# Patient Record
Sex: FEMALE | Race: WHITE | NOT HISPANIC OR LATINO | Employment: OTHER | URBAN - METROPOLITAN AREA
[De-identification: names, ages, dates, MRNs, and addresses within clinical notes are randomized per-mention and may not be internally consistent; named-entity substitution may affect disease eponyms.]

---

## 2017-11-23 ENCOUNTER — HOSPITAL ENCOUNTER (EMERGENCY)
Facility: OTHER | Age: 52
Discharge: HOME | End: 2017-11-23
Attending: EMERGENCY MEDICINE
Payer: OTHER GOVERNMENT

## 2017-11-23 VITALS
BODY MASS INDEX: 31.28 KG/M2 | WEIGHT: 170 LBS | HEIGHT: 62 IN | OXYGEN SATURATION: 100 % | HEART RATE: 97 BPM | RESPIRATION RATE: 16 BRPM | TEMPERATURE: 99 F | SYSTOLIC BLOOD PRESSURE: 157 MMHG | DIASTOLIC BLOOD PRESSURE: 102 MMHG

## 2017-11-23 DIAGNOSIS — J40 BRONCHITIS: Primary | ICD-10-CM

## 2017-11-23 PROCEDURE — 99283 EMERGENCY DEPT VISIT LOW MDM: CPT

## 2017-11-23 RX ORDER — CANDESARTAN 32 MG/1
32 TABLET ORAL DAILY
COMMUNITY
End: 2019-02-20

## 2017-11-23 RX ORDER — PREDNISONE 10 MG/1
10 TABLET ORAL DAILY
Qty: 5 TABLET | Refills: 0 | Status: SHIPPED | OUTPATIENT
Start: 2017-11-23 | End: 2017-11-28

## 2017-11-23 RX ORDER — ALBUTEROL SULFATE 90 UG/1
2 AEROSOL, METERED RESPIRATORY (INHALATION) EVERY 6 HOURS PRN
Qty: 6.7 G | Refills: 0 | Status: SHIPPED | OUTPATIENT
Start: 2017-11-23 | End: 2022-04-16

## 2017-11-23 RX ORDER — AZITHROMYCIN 250 MG/1
500 TABLET, FILM COATED ORAL DAILY
Qty: 6 TABLET | Refills: 0 | Status: SHIPPED | OUTPATIENT
Start: 2017-11-23 | End: 2019-02-20

## 2017-11-23 NOTE — ED PROVIDER NOTES
Encounter Date: 11/23/2017       History     Chief Complaint   Patient presents with    Cough    Otalgia     The history is provided by the patient.   Cough   This is a new problem. The current episode started yesterday. The problem occurs constantly. The problem has been unchanged. The cough is non-productive. There has been no fever. The fever has been present for 1 to 2 days. Associated symptoms include ear pain, rhinorrhea and sore throat. Pertinent negatives include no chest pain, no chills, no sweats, no weight loss, no ear congestion, no headaches, no myalgias, no shortness of breath, no wheezing and no eye redness. She has tried nothing for the symptoms. She is not a smoker. Her past medical history is significant for bronchitis.   Otalgia   Associated symptoms include rhinorrhea, sore throat and cough. Pertinent negatives include no headaches.     Review of patient's allergies indicates:  No Known Allergies  Past Medical History:   Diagnosis Date    High cholesterol     Hypertension     Thyroid disease      No past surgical history on file.  No family history on file.  Social History   Substance Use Topics    Smoking status: Never Smoker    Smokeless tobacco: Never Used    Alcohol use Not on file     Review of Systems   Constitutional: Negative.  Negative for chills and weight loss.   HENT: Positive for ear pain, rhinorrhea and sore throat.    Eyes: Negative.  Negative for redness.   Respiratory: Positive for cough. Negative for shortness of breath and wheezing.    Cardiovascular: Negative.  Negative for chest pain.   Gastrointestinal: Negative.    Endocrine: Negative.    Genitourinary: Negative.    Musculoskeletal: Negative.  Negative for myalgias.   Skin: Negative.    Allergic/Immunologic: Negative.    Neurological: Negative.  Negative for headaches.   Hematological: Negative.    Psychiatric/Behavioral: Negative.    All other systems reviewed and are negative.      Physical Exam     Initial Vitals  [11/23/17 1727]   BP Pulse Resp Temp SpO2   (!) 157/102 97 16 98.8 °F (37.1 °C) 100 %      MAP       120.33         Physical Exam    Nursing note and vitals reviewed.  Constitutional: Vital signs are normal. She is Obese . She is active and cooperative.   HENT:   Head: Normocephalic and atraumatic.   Right Ear: Tympanic membrane normal.   Left Ear: Tympanic membrane normal.   Nose: Mucosal edema and rhinorrhea present.   Mouth/Throat: Uvula is midline, oropharynx is clear and moist and mucous membranes are normal.   Eyes: Conjunctivae, EOM and lids are normal. Pupils are equal, round, and reactive to light.   Neck: Trachea normal and full passive range of motion without pain. Neck supple. No thyroid mass present. No spinous process tenderness and no muscular tenderness present. No neck rigidity.   Cardiovascular: Normal rate, regular rhythm, S1 normal, S2 normal, normal heart sounds, intact distal pulses and normal pulses.   Pulmonary/Chest: Effort normal and breath sounds normal.           Abdominal: Soft. Normal appearance, normal aorta and bowel sounds are normal.   Musculoskeletal: Normal range of motion.   Lymphadenopathy:     She has no axillary adenopathy.   Neurological: She is alert and oriented to person, place, and time.   Skin: Skin is warm, dry and intact.   Psychiatric: She has a normal mood and affect. Her speech is normal and behavior is normal. Judgment and thought content normal. Cognition and memory are normal.         ED Course   Procedures  Labs Reviewed   POCT URINE PREGNANCY                               ED Course      Clinical Impression:   The encounter diagnosis was Bronchitis.                           Duglas Brown MD  11/23/17 [id number]

## 2017-11-23 NOTE — ED TRIAGE NOTES
"Cough and congestion onset Monday. Seen by MD on Tuesday and given a steroid injection "not better". Denies fever Alert in no resp distress   "

## 2018-02-10 ENCOUNTER — HOSPITAL ENCOUNTER (EMERGENCY)
Facility: HOSPITAL | Age: 53
Discharge: HOME | End: 2018-02-10
Attending: EMERGENCY MEDICINE
Payer: OTHER GOVERNMENT

## 2018-02-10 VITALS
BODY MASS INDEX: 35.5 KG/M2 | SYSTOLIC BLOOD PRESSURE: 147 MMHG | HEIGHT: 61 IN | DIASTOLIC BLOOD PRESSURE: 81 MMHG | HEART RATE: 75 BPM | TEMPERATURE: 99 F | OXYGEN SATURATION: 100 % | WEIGHT: 188 LBS | RESPIRATION RATE: 20 BRPM

## 2018-02-10 DIAGNOSIS — R07.89 CHEST PAIN, NON-CARDIAC: Primary | ICD-10-CM

## 2018-02-10 LAB
ALBUMIN SERPL BCP-MCNC: 4.1 G/DL
ALP SERPL-CCNC: 84 U/L
ALT SERPL W/O P-5'-P-CCNC: 16 U/L
ANION GAP SERPL CALC-SCNC: 11 MMOL/L
AST SERPL-CCNC: 19 U/L
BASOPHILS # BLD AUTO: 0.05 K/UL
BASOPHILS NFR BLD: 1 %
BILIRUB SERPL-MCNC: 0.3 MG/DL
BNP SERPL-MCNC: 12 PG/ML
BUN SERPL-MCNC: 12 MG/DL
CALCIUM SERPL-MCNC: 9.8 MG/DL
CHLORIDE SERPL-SCNC: 105 MMOL/L
CO2 SERPL-SCNC: 23 MMOL/L
CREAT SERPL-MCNC: 0.8 MG/DL
DIFFERENTIAL METHOD: NORMAL
EOSINOPHIL # BLD AUTO: 0.3 K/UL
EOSINOPHIL NFR BLD: 6 %
ERYTHROCYTE [DISTWIDTH] IN BLOOD BY AUTOMATED COUNT: 13.7 %
EST. GFR  (AFRICAN AMERICAN): >60 ML/MIN/1.73 M^2
EST. GFR  (NON AFRICAN AMERICAN): >60 ML/MIN/1.73 M^2
GLUCOSE SERPL-MCNC: 83 MG/DL
HCT VFR BLD AUTO: 40.3 %
HGB BLD-MCNC: 13.6 G/DL
INR PPP: 1.4
LYMPHOCYTES # BLD AUTO: 1.7 K/UL
LYMPHOCYTES NFR BLD: 33.8 %
MCH RBC QN AUTO: 28.1 PG
MCHC RBC AUTO-ENTMCNC: 33.7 G/DL
MCV RBC AUTO: 83 FL
MONOCYTES # BLD AUTO: 0.6 K/UL
MONOCYTES NFR BLD: 12.2 %
NEUTROPHILS # BLD AUTO: 2.4 K/UL
NEUTROPHILS NFR BLD: 47 %
PLATELET # BLD AUTO: 301 K/UL
PMV BLD AUTO: 10.3 FL
POTASSIUM SERPL-SCNC: 4.2 MMOL/L
PROT SERPL-MCNC: 7.7 G/DL
PROTHROMBIN TIME: 14.2 SEC
RBC # BLD AUTO: 4.84 M/UL
SODIUM SERPL-SCNC: 139 MMOL/L
TROPONIN I SERPL DL<=0.01 NG/ML-MCNC: <0.006 NG/ML
TROPONIN I SERPL DL<=0.01 NG/ML-MCNC: <0.006 NG/ML
WBC # BLD AUTO: 5 K/UL

## 2018-02-10 PROCEDURE — 96374 THER/PROPH/DIAG INJ IV PUSH: CPT

## 2018-02-10 PROCEDURE — 84484 ASSAY OF TROPONIN QUANT: CPT

## 2018-02-10 PROCEDURE — 85610 PROTHROMBIN TIME: CPT

## 2018-02-10 PROCEDURE — 99284 EMERGENCY DEPT VISIT MOD MDM: CPT | Mod: 25

## 2018-02-10 PROCEDURE — 93010 ELECTROCARDIOGRAM REPORT: CPT | Mod: ,,, | Performed by: INTERNAL MEDICINE

## 2018-02-10 PROCEDURE — 25000003 PHARM REV CODE 250: Performed by: EMERGENCY MEDICINE

## 2018-02-10 PROCEDURE — 83880 ASSAY OF NATRIURETIC PEPTIDE: CPT

## 2018-02-10 PROCEDURE — 63600175 PHARM REV CODE 636 W HCPCS: Performed by: EMERGENCY MEDICINE

## 2018-02-10 PROCEDURE — 93005 ELECTROCARDIOGRAM TRACING: CPT

## 2018-02-10 PROCEDURE — 80053 COMPREHEN METABOLIC PANEL: CPT

## 2018-02-10 PROCEDURE — 85025 COMPLETE CBC W/AUTO DIFF WBC: CPT

## 2018-02-10 RX ORDER — PANTOPRAZOLE SODIUM 40 MG/1
80 TABLET, DELAYED RELEASE ORAL
Status: COMPLETED | OUTPATIENT
Start: 2018-02-10 | End: 2018-02-10

## 2018-02-10 RX ORDER — DICYCLOMINE HYDROCHLORIDE 20 MG/1
20 TABLET ORAL EVERY 6 HOURS PRN
Qty: 20 TABLET | Refills: 0 | Status: SHIPPED | OUTPATIENT
Start: 2018-02-10 | End: 2019-02-20

## 2018-02-10 RX ORDER — KETOROLAC TROMETHAMINE 30 MG/ML
30 INJECTION, SOLUTION INTRAMUSCULAR; INTRAVENOUS
Status: COMPLETED | OUTPATIENT
Start: 2018-02-10 | End: 2018-02-10

## 2018-02-10 RX ORDER — VALSARTAN 80 MG/1
160 TABLET ORAL 2 TIMES DAILY
COMMUNITY

## 2018-02-10 RX ORDER — MAG HYDROX/ALUMINUM HYD/SIMETH 200-200-20
60 SUSPENSION, ORAL (FINAL DOSE FORM) ORAL
Status: COMPLETED | OUTPATIENT
Start: 2018-02-10 | End: 2018-02-10

## 2018-02-10 RX ORDER — PANTOPRAZOLE SODIUM 40 MG/1
40 TABLET, DELAYED RELEASE ORAL 2 TIMES DAILY
Qty: 30 TABLET | Refills: 0 | Status: SHIPPED | OUTPATIENT
Start: 2018-02-10 | End: 2019-02-20

## 2018-02-10 RX ORDER — DICYCLOMINE HYDROCHLORIDE 10 MG/1
20 CAPSULE ORAL
Status: COMPLETED | OUTPATIENT
Start: 2018-02-10 | End: 2018-02-10

## 2018-02-10 RX ADMIN — PANTOPRAZOLE SODIUM 80 MG: 40 TABLET, DELAYED RELEASE ORAL at 06:02

## 2018-02-10 RX ADMIN — DICYCLOMINE HYDROCHLORIDE 20 MG: 10 CAPSULE ORAL at 06:02

## 2018-02-10 RX ADMIN — ALUMINUM HYDROXIDE, MAGNESIUM HYDROXIDE, AND SIMETHICONE 60 ML: 200; 200; 20 SUSPENSION ORAL at 06:02

## 2018-02-10 RX ADMIN — KETOROLAC TROMETHAMINE 30 MG: 30 INJECTION, SOLUTION INTRAMUSCULAR at 06:02

## 2018-02-10 NOTE — ED TRIAGE NOTES
Pt reports nonradiating midsternal chest tightness. Pt reports recent switch to valsartan, but still can't control BP. Pt reports occasional SOB. Denies headache, dizzines, NVD.

## 2018-02-11 NOTE — DISCHARGE INSTRUCTIONS
Please follow-up with the cardiologist above as directed.  Please return immediately if you get worse or if new problems develop.  Please avoid caffeine carbonation alcohol cigarette citrus tomato ibuprofen and Naprosyn and aspirin.

## 2018-02-11 NOTE — ED PROVIDER NOTES
"Encounter Date: 2/10/2018    SCRIBE #1 NOTE: I, Marbella Garcia , am scribing for, and in the presence of,  Nakul Mora MD . I have scribed the following portions of the note - Other sections scribed: HPI/ROS .       History     Chief Complaint   Patient presents with    Chest Pain     sob started today and "twinging" pain to midsternal area which started today; reports bp meds recently changed    Shortness of Breath     CC: Chest Pain     HPI: This 52 y.o. female with a medical hx of HTN, high cholesterol, and thyroid disease presents to the ED c/o sudden-onset chest tightness that has been occurring intermittently every 15-20 minutes and lasting for 2 minutes prior to spontaneously resolving since noon today. During episodes of chest tightness, pt reports experiencing "twinges" of pain to the middle of her chest. Earlier while at her office, she reports having SOB along with the chest tightness, but denies any SOB currently. For the past couple of weeks, pt reports having trouble controlling her blood pressure despite being compliant with her antihypertensives and was thus switched to valsartan 2 days ago. Pt also reports taking 81 mg ASA daily, but denies a cardiac hx. She notes that she has been belching more frequently and thinks this may be related to her hx of acid reflux. Pt otherwise denies fever, chills, abdominal pain, diaphoresis, dizziness, and N/V.       The history is provided by the patient. No  was used.     Review of patient's allergies indicates:  No Known Allergies  Past Medical History:   Diagnosis Date    High cholesterol     Hypertension     Thyroid disease      No past surgical history on file.  No family history on file.  Social History   Substance Use Topics    Smoking status: Never Smoker    Smokeless tobacco: Never Used    Alcohol use Not on file     Review of Systems   Constitutional: Negative for chills, diaphoresis and fever.   HENT: Negative for " congestion, ear pain, rhinorrhea and sore throat.    Eyes: Negative for visual disturbance.   Respiratory: Positive for chest tightness. Negative for cough and shortness of breath.    Cardiovascular: Negative for chest pain, palpitations and leg swelling.   Gastrointestinal: Negative for abdominal pain, diarrhea, nausea and vomiting.   Genitourinary: Negative for dysuria.   Musculoskeletal: Negative for back pain.   Skin: Negative for rash.   Neurological: Negative for dizziness, weakness, light-headedness, numbness and headaches.       Physical Exam     Initial Vitals [02/10/18 1637]   BP Pulse Resp Temp SpO2   (!) 177/94 75 20 97.8 °F (36.6 °C) 99 %      MAP       121.67         Physical Exam  The patient was examined specifically for the following:   General:No significant distress, Good color, Warm and dry. Head and neck:Scalp atraumatic, Neck supple. Neurological:Appropriate conversation, Gross motor deficits. Eyes:Conjugate gaze, Clear corneas. ENT: No epistaxis. Cardiac: Regular rate and rhythm, Grossly normal heart tones. Pulmonary: Wheezing, Rales. Gastrointestinal: Abdominal tenderness, Abdominal distention. Musculoskeletal: Extremity deformity, Apparent pain with range of motion of the joints. Skin: Rash.   The findings on examination were normal. The lungs are clear.  The heart tones are normal.  The abdomen is soft.  Extremities nontender.  There is no pale range of motion of any joints.  The patient has no rash.  ED Course   Procedures  Labs Reviewed   PROTIME-INR - Abnormal; Notable for the following:        Result Value    Prothrombin Time 14.2 (*)     INR 1.4 (*)     All other components within normal limits   CBC W/ AUTO DIFFERENTIAL   COMPREHENSIVE METABOLIC PANEL   TROPONIN I   B-TYPE NATRIURETIC PEPTIDE   TROPONIN I     EKG Readings: (Independently Interpreted)   Patient is in a normal sinus rhythm with a heart rate is 69.  The IA QRS and QT intervals are normal.  There are no significant ST  segment or T-wave changes.  There is no evidence of acute myocardial infarction or malignant arrhythmia.       X-Rays:   Independently Interpreted Readings:   Other Readings:  Chest x-ray fails to reveal pneumothorax pneumonia pleural effusion.    Decision making: Given the above this patient has an atypical chest pain story.  She is having high blood pressure.  She is getting little twinges of chest pain.  She has episodes come and go.  They recur spontaneously.   The patient's evaluation in the emergency room was essentially unremarkable.  At 7:30.  The patient is having a little fluttering feeling was that last 1 second.  The previous symptoms have resolved since treatment with Maalox and pantoprazole.  I will repeat a second troponin, and if it is normal, I will discharge this patient to follow-up with cardiology this week.  Chest x-ray fails to reveal pneumothorax pneumonia pleural effusion.  There is no tachycardia.  I doubt pulmonary embolus.   I will sign out the second troponin to the next shift.             Scribe Attestation:   Scribe #1: I performed the above scribed service and the documentation accurately describes the services I performed. I attest to the accuracy of the note.    Attending Attestation:           Physician Attestation for Scribe:  Physician Attestation Statement for Scribe #1: I, Nakul Mora MD , reviewed documentation, as scribed by Marbella Garcia  in my presence, and it is both accurate and complete.                 ED Course      Clinical Impression:   The encounter diagnosis was Chest pain, non-cardiac.                           Nakul Mora MD  02/10/18 [id number]

## 2019-02-20 ENCOUNTER — OFFICE VISIT (OUTPATIENT)
Dept: URGENT CARE | Facility: CLINIC | Age: 54
End: 2019-02-20
Payer: OTHER GOVERNMENT

## 2019-02-20 VITALS
BODY MASS INDEX: 35.52 KG/M2 | WEIGHT: 188 LBS | DIASTOLIC BLOOD PRESSURE: 86 MMHG | TEMPERATURE: 99 F | HEART RATE: 80 BPM | SYSTOLIC BLOOD PRESSURE: 150 MMHG | OXYGEN SATURATION: 100 %

## 2019-02-20 DIAGNOSIS — R68.89 FLU-LIKE SYMPTOMS: ICD-10-CM

## 2019-02-20 DIAGNOSIS — J02.9 SORE THROAT: ICD-10-CM

## 2019-02-20 DIAGNOSIS — I10 HYPERTENSION, UNSPECIFIED TYPE: ICD-10-CM

## 2019-02-20 DIAGNOSIS — Z20.828 EXPOSURE TO THE FLU: Primary | ICD-10-CM

## 2019-02-20 LAB
CTP QC/QA: YES
CTP QC/QA: YES
FLUAV AG NPH QL: NEGATIVE
FLUBV AG NPH QL: NEGATIVE
S PYO RRNA THROAT QL PROBE: NEGATIVE

## 2019-02-20 PROCEDURE — 99203 PR OFFICE/OUTPT VISIT, NEW, LEVL III, 30-44 MIN: ICD-10-PCS | Mod: 25,S$GLB,, | Performed by: NURSE PRACTITIONER

## 2019-02-20 PROCEDURE — 87804 INFLUENZA ASSAY W/OPTIC: CPT | Mod: QW,S$GLB,, | Performed by: NURSE PRACTITIONER

## 2019-02-20 PROCEDURE — 87880 STREP A ASSAY W/OPTIC: CPT | Mod: QW,S$GLB,, | Performed by: NURSE PRACTITIONER

## 2019-02-20 PROCEDURE — 87804 POCT INFLUENZA A/B: ICD-10-PCS | Mod: 59,QW,S$GLB, | Performed by: NURSE PRACTITIONER

## 2019-02-20 PROCEDURE — 99203 OFFICE O/P NEW LOW 30 MIN: CPT | Mod: 25,S$GLB,, | Performed by: NURSE PRACTITIONER

## 2019-02-20 PROCEDURE — 87880 POCT RAPID STREP A: ICD-10-PCS | Mod: QW,S$GLB,, | Performed by: NURSE PRACTITIONER

## 2019-02-20 RX ORDER — OSELTAMIVIR PHOSPHATE 75 MG/1
75 CAPSULE ORAL DAILY
Qty: 10 CAPSULE | Refills: 0 | Status: SHIPPED | OUTPATIENT
Start: 2019-02-20 | End: 2019-03-02

## 2019-02-20 RX ORDER — PROMETHAZINE HYDROCHLORIDE AND CODEINE PHOSPHATE 6.25; 1 MG/5ML; MG/5ML
5 SOLUTION ORAL EVERY 6 HOURS PRN
Qty: 240 ML | Refills: 0 | Status: SHIPPED | OUTPATIENT
Start: 2019-02-20 | End: 2019-02-27

## 2019-02-20 NOTE — PROGRESS NOTES
Subjective:       Patient ID: Dea Ravi is a 53 y.o. female.    Vitals:  weight is 85.3 kg (188 lb). Her temperature is 98.7 °F (37.1 °C). Her blood pressure is 150/86 (abnormal) and her pulse is 80. Her oxygen saturation is 100%.     Chief Complaint: URI    Patient states she started to feel bad last night.  Daughter was diagnosed with the flu on Monday.      URI    This is a new problem. The current episode started yesterday. The problem has been gradually worsening. There has been no fever. Associated symptoms include congestion, coughing, headaches, rhinorrhea and sneezing. Pertinent negatives include no chest pain, diarrhea, dysuria, nausea, rash, sore throat or vomiting. Treatments tried: natural theraflu. The treatment provided no relief.       Constitution: Negative for chills, fatigue and fever.        Pt feels hot   HENT: Positive for congestion, postnasal drip and voice change. Negative for sore throat.    Neck: Negative for painful lymph nodes.   Cardiovascular: Negative for chest pain and leg swelling.   Eyes: Negative for double vision and blurred vision.   Respiratory: Positive for cough. Negative for shortness of breath.    Gastrointestinal: Negative for nausea, vomiting and diarrhea.   Genitourinary: Negative for dysuria, frequency, urgency and history of kidney stones.   Musculoskeletal: Negative for joint pain, joint swelling, muscle cramps and muscle ache.   Skin: Negative for color change, pale, rash and bruising.   Allergic/Immunologic: Positive for sneezing. Negative for seasonal allergies.   Neurological: Positive for headaches. Negative for dizziness, history of vertigo, light-headedness and passing out.   Hematologic/Lymphatic: Negative for swollen lymph nodes.   Psychiatric/Behavioral: Negative for nervous/anxious, sleep disturbance and depression. The patient is not nervous/anxious.        Objective:      Physical Exam   Constitutional: She is oriented to person, place, and time.  She appears well-developed and well-nourished. She is cooperative.  Non-toxic appearance. She does not have a sickly appearance. She does not appear ill. No distress.   HENT:   Head: Normocephalic and atraumatic.   Right Ear: Hearing, external ear and ear canal normal. A middle ear effusion is present.   Left Ear: Hearing, external ear and ear canal normal. A middle ear effusion is present.   Nose: Mucosal edema and rhinorrhea present. No nasal deformity. No epistaxis. Right sinus exhibits no maxillary sinus tenderness and no frontal sinus tenderness. Left sinus exhibits no maxillary sinus tenderness and no frontal sinus tenderness.   Mouth/Throat: Uvula is midline and mucous membranes are normal. No trismus in the jaw. Normal dentition. No uvula swelling. Posterior oropharyngeal erythema present.   Eyes: Conjunctivae and lids are normal. No scleral icterus.   Sclera clear bilat   Neck: Trachea normal, normal range of motion, full passive range of motion without pain and phonation normal. Neck supple. No spinous process tenderness and no muscular tenderness present. No neck rigidity. Normal range of motion present.   Cardiovascular: Normal rate, regular rhythm, normal heart sounds and normal pulses.   Pulmonary/Chest: Effort normal and breath sounds normal. No respiratory distress.   Abdominal: Soft. Normal appearance and bowel sounds are normal. She exhibits no distension. There is no tenderness.   Musculoskeletal: Normal range of motion. She exhibits no edema or deformity.   Lymphadenopathy:     She has cervical adenopathy.        Right cervical: Superficial cervical adenopathy present.        Left cervical: Superficial cervical adenopathy present.   Neurological: She is alert and oriented to person, place, and time. She exhibits normal muscle tone. Coordination normal.   Skin: Skin is warm, dry and intact. Capillary refill takes less than 2 seconds. She is not diaphoretic. No pallor.   Psychiatric: She has a  normal mood and affect. Her speech is normal and behavior is normal. Judgment and thought content normal. Cognition and memory are normal.   Nursing note and vitals reviewed.      Results for orders placed or performed in visit on 02/20/19   POCT Influenza A/B   Result Value Ref Range    Rapid Influenza A Ag Negative Negative    Rapid Influenza B Ag Negative Negative     Acceptable Yes    POCT rapid strep A   Result Value Ref Range    Rapid Strep A Screen Negative Negative     Acceptable Yes      Assessment:       1. Exposure to the flu    2. Sore throat    3. Flu-like symptoms    4. Hypertension, unspecified type        Plan:         Exposure to the flu  -     POCT Influenza A/B  -     oseltamivir (TAMIFLU) 75 MG capsule; Take 1 capsule (75 mg total) by mouth once daily. for 10 days  Dispense: 10 capsule; Refill: 0  -     promethazine-codeine 6.25-10 mg/5 ml (PHENERGAN WITH CODEINE) 6.25-10 mg/5 mL syrup; Take 5 mLs by mouth every 6 (six) hours as needed for Cough.  Dispense: 240 mL; Refill: 0    Sore throat  -     POCT rapid strep A    Flu-like symptoms  -     POCT Influenza A/B  -     oseltamivir (TAMIFLU) 75 MG capsule; Take 1 capsule (75 mg total) by mouth once daily. for 10 days  Dispense: 10 capsule; Refill: 0  -     promethazine-codeine 6.25-10 mg/5 ml (PHENERGAN WITH CODEINE) 6.25-10 mg/5 mL syrup; Take 5 mLs by mouth every 6 (six) hours as needed for Cough.  Dispense: 240 mL; Refill: 0    Hypertension, unspecified type      Patient Instructions       Please drink plenty of fluids.  Please get plenty of rest.  Please return here or go to the Emergency Department for any concerns or worsening of condition.  Tamiflu prescription has been discussed and if prescribed, please take to completion unless you cannot tolerate the side effects.   If you were prescribed a narcotic medication, do not drive or operate heavy equipment or machinery while taking these medications.  If you were  given a steroid shot in the clinic and have also been given a prescription for a steroid such as Prednisone or a Medrol Dose Pack, please begin taking them tomorrow.  If you do not have Hypertension or any history of palpitations, it is ok to take over the counter Sudafed or Mucinex D or Allegra-D or Claritin-D or Zyrtec-D.  If you do take one of the above, it is ok to combine that with plain over the counter Mucinex or Allegra or Claritin or Zyrtec.  If for example you are taking Zyrtec -D, you can combine that with Mucinex, but not Mucinex-D.  If you are taking Mucinex-D, you can combine that with plain Allegra or Claritin or Zyrtec.   If you do have Hypertension or palpitations, it is safe to take Coricidin HBP for relief of sinus symptoms.  If not allergic, please take over the counter Tylenol (Acetaminophen) and/or Motrin (Ibuprofen) as directed for control of pain and/or fever.  Please follow up with your primary care doctor or specialist as needed.    If you  smoke, please stop smoking.    The Flu (Influenza)     The virus that causes the flu spreads through the air in droplets when someone who has the flu coughs, sneezes, laughs, or talks.   The flu (influenza) is an infection that affects your respiratory tract. This tract is made up of your mouth, nose, and lungs, and the passages between them. Unlike a cold, the flu can make you very ill. And it can lead to pneumonia, a serious lung infection. The flu can have serious complications and even cause death.  Who is at risk for the flu?  Anyone can get the flu. But you are more likely to become infected if you:  · Have a weakened immune system  · Work in a healthcare setting where you may be exposed to flu germs  · Live or work with someone who has the flu  · Havent had an annual flu shot  How does the flu spread?  The flu is caused by a virus. The virus spreads through the air in droplets when someone who has the flu coughs, sneezes, laughs, or talks. You  can become infected when you inhale these viruses directly. You can also become infected when you touch a surface on which the droplets have landed and then transfer the germs to your eyes, nose, or mouth. Touching used tissues, or sharing utensils, drinking glasses, or a toothbrush from an infected person can expose you to flu viruses, too.  What are the symptoms of the flu?  Flu symptoms tend to come on quickly and may last a few days to a few weeks. They include:  · Fever usually higher than 100.4°F  (38°C) and chills  · Sore throat and headache  · Dry cough  · Runny nose  · Tiredness and weakness  · Muscle aches  Who is at risk for flu complications?  For some people, the flu can be very serious. The risk for complications is greater for:  · Children younger than age 5  · Adults ages 65 and older  · People with a chronic illness such as diabetes or heart, kidney, or lung disease  · People who live in a nursing home or long-term care facility   How is the flu treated?  The flu usually gets better after 7 days or so. In some cases, your healthcare provider may prescribe an antiviral medicine. This may help you get well a little sooner. For the medicine to help, you need to take it as soon as possible (ideally within 48 hours) after your symptoms start. If you develop pneumonia or other serious illness, you may need to stay in the hospital.  Easing flu symptoms  · Drink lots of fluids such as water, juice, and warm soup. A good rule is to drink enough so that you urinate your normal amount.  · Get plenty of rest.  · Ask your healthcare provider what to take for fever and pain.  · Call your provider if your fever is 100.4°F (38°C) or higher, or you become dizzy, lightheaded, or short of breath.  Taking steps to protect others  · Wash your hands often, especially after coughing or sneezing. Or clean your hands with an alcohol-based hand  containing at least 60% alcohol.  · Cough or sneeze into a tissue. Then  throw the tissue away and wash your hands. If you dont have a tissue, cough and sneeze into your elbow.  · Stay home until at least 24 hours after you no longer have a fever or chills. Be sure the fever isnt being hidden by fever-reducing medicine.  · Dont share food, utensils, drinking glasses, or a toothbrush with others.  · Ask your healthcare provider if others in your household should get antiviral medicine to help them avoid infection.  How can the flu be prevented?  · One of the best ways to avoid the flu is to get a flu vaccine each year. The virus that causes the flu changes from year to year. For that reason, healthcare providers recommend getting the flu vaccine each year, as soon as it's available in your area. The vaccine is given as a shot. Your healthcare provider can tell you which vaccine is right for you. A nasal spray is also available but is not recommended for the 0727-3631 flu season. The CDC says this is because the nasal spray did not seem to protect against the flu over the last several flu seasons. In the past, it was meant for people ages 2 to 49.  · Wash your hands often. Frequent handwashing is a proven way to help prevent infection.  · Carry an alcohol-based hand gel containing at least 60% alcohol. Use it when you can't use soap and water. Then wash your hands as soon as you can.  · Avoid touching your eyes, nose, and mouth.  · At home and work, clean phones, computer keyboards, and toys often with disinfectant wipes.  · If possible, avoid close contact with others who have the flu or symptoms of the flu.  Handwashing tips  Handwashing is one of the best ways to prevent many common infections. If you are caring for or visiting someone with the flu, wash your hands each time you enter and leave the room. Follow these steps:  · Use warm water and plenty of soap. Rub your hands together well.  · Clean the whole hand, including under your nails, between your fingers, and up the  wrists.  · Wash for at least 15 seconds.  · Rinse, letting the water run down your fingers, not up your wrists.  · Dry your hands well. Use a paper towel to turn off the faucet and open the door.  Using alcohol-based hand   Alcohol-based hand  are also a good choice. Use them when you can't use soap and water. Follow these steps:  · Squeeze about a tablespoon of gel into the palm of one hand.  · Rub your hands together briskly, cleaning the backs of your hands, the palms, between your fingers, and up the wrists.  · Rub until the gel is gone and your hands are completely dry.  Preventing the flu in healthcare settings  The flu is a special concern for people in hospitals and long-term care facilities. To help prevent the spread of flu, many hospitals and nursing homes take these steps:  · Healthcare providers wash their hands or use an alcohol-based hand  before and after treating each patient.  · People with the flu have private rooms and bathrooms or share a room with someone with the same infection.  · People who are at high risk for the flu but don't have it are encouraged to get the flu and pneumonia vaccines.  · All healthcare workers are encouraged or required to get flu shots.   Date Last Reviewed: 12/1/2016  © 9760-2741 [employer]. [address] [place] [address], [place] [address]. All rights reserved. This information is not intended as a substitute for professional medical care. Always follow your healthcare professional's instructions.

## 2019-02-20 NOTE — PATIENT INSTRUCTIONS
Please drink plenty of fluids.  Please get plenty of rest.  Please return here or go to the Emergency Department for any concerns or worsening of condition.  Tamiflu prescription has been discussed and if prescribed, please take to completion unless you cannot tolerate the side effects.   If you were prescribed a narcotic medication, do not drive or operate heavy equipment or machinery while taking these medications.  If you were given a steroid shot in the clinic and have also been given a prescription for a steroid such as Prednisone or a Medrol Dose Pack, please begin taking them tomorrow.  If you do not have Hypertension or any history of palpitations, it is ok to take over the counter Sudafed or Mucinex D or Allegra-D or Claritin-D or Zyrtec-D.  If you do take one of the above, it is ok to combine that with plain over the counter Mucinex or Allegra or Claritin or Zyrtec.  If for example you are taking Zyrtec -D, you can combine that with Mucinex, but not Mucinex-D.  If you are taking Mucinex-D, you can combine that with plain Allegra or Claritin or Zyrtec.   If you do have Hypertension or palpitations, it is safe to take Coricidin HBP for relief of sinus symptoms.  If not allergic, please take over the counter Tylenol (Acetaminophen) and/or Motrin (Ibuprofen) as directed for control of pain and/or fever.  Please follow up with your primary care doctor or specialist as needed.    If you  smoke, please stop smoking.    The Flu (Influenza)     The virus that causes the flu spreads through the air in droplets when someone who has the flu coughs, sneezes, laughs, or talks.   The flu (influenza) is an infection that affects your respiratory tract. This tract is made up of your mouth, nose, and lungs, and the passages between them. Unlike a cold, the flu can make you very ill. And it can lead to pneumonia, a serious lung infection. The flu can have serious complications and even cause death.  Who is at risk for the  flu?  Anyone can get the flu. But you are more likely to become infected if you:  · Have a weakened immune system  · Work in a healthcare setting where you may be exposed to flu germs  · Live or work with someone who has the flu  · Havent had an annual flu shot  How does the flu spread?  The flu is caused by a virus. The virus spreads through the air in droplets when someone who has the flu coughs, sneezes, laughs, or talks. You can become infected when you inhale these viruses directly. You can also become infected when you touch a surface on which the droplets have landed and then transfer the germs to your eyes, nose, or mouth. Touching used tissues, or sharing utensils, drinking glasses, or a toothbrush from an infected person can expose you to flu viruses, too.  What are the symptoms of the flu?  Flu symptoms tend to come on quickly and may last a few days to a few weeks. They include:  · Fever usually higher than 100.4°F  (38°C) and chills  · Sore throat and headache  · Dry cough  · Runny nose  · Tiredness and weakness  · Muscle aches  Who is at risk for flu complications?  For some people, the flu can be very serious. The risk for complications is greater for:  · Children younger than age 5  · Adults ages 65 and older  · People with a chronic illness such as diabetes or heart, kidney, or lung disease  · People who live in a nursing home or long-term care facility   How is the flu treated?  The flu usually gets better after 7 days or so. In some cases, your healthcare provider may prescribe an antiviral medicine. This may help you get well a little sooner. For the medicine to help, you need to take it as soon as possible (ideally within 48 hours) after your symptoms start. If you develop pneumonia or other serious illness, you may need to stay in the hospital.  Easing flu symptoms  · Drink lots of fluids such as water, juice, and warm soup. A good rule is to drink enough so that you urinate your normal  amount.  · Get plenty of rest.  · Ask your healthcare provider what to take for fever and pain.  · Call your provider if your fever is 100.4°F (38°C) or higher, or you become dizzy, lightheaded, or short of breath.  Taking steps to protect others  · Wash your hands often, especially after coughing or sneezing. Or clean your hands with an alcohol-based hand  containing at least 60% alcohol.  · Cough or sneeze into a tissue. Then throw the tissue away and wash your hands. If you dont have a tissue, cough and sneeze into your elbow.  · Stay home until at least 24 hours after you no longer have a fever or chills. Be sure the fever isnt being hidden by fever-reducing medicine.  · Dont share food, utensils, drinking glasses, or a toothbrush with others.  · Ask your healthcare provider if others in your household should get antiviral medicine to help them avoid infection.  How can the flu be prevented?  · One of the best ways to avoid the flu is to get a flu vaccine each year. The virus that causes the flu changes from year to year. For that reason, healthcare providers recommend getting the flu vaccine each year, as soon as it's available in your area. The vaccine is given as a shot. Your healthcare provider can tell you which vaccine is right for you. A nasal spray is also available but is not recommended for the 3521-8540 flu season. The CDC says this is because the nasal spray did not seem to protect against the flu over the last several flu seasons. In the past, it was meant for people ages 2 to 49.  · Wash your hands often. Frequent handwashing is a proven way to help prevent infection.  · Carry an alcohol-based hand gel containing at least 60% alcohol. Use it when you can't use soap and water. Then wash your hands as soon as you can.  · Avoid touching your eyes, nose, and mouth.  · At home and work, clean phones, computer keyboards, and toys often with disinfectant wipes.  · If possible, avoid close  contact with others who have the flu or symptoms of the flu.  Handwashing tips  Handwashing is one of the best ways to prevent many common infections. If you are caring for or visiting someone with the flu, wash your hands each time you enter and leave the room. Follow these steps:  · Use warm water and plenty of soap. Rub your hands together well.  · Clean the whole hand, including under your nails, between your fingers, and up the wrists.  · Wash for at least 15 seconds.  · Rinse, letting the water run down your fingers, not up your wrists.  · Dry your hands well. Use a paper towel to turn off the faucet and open the door.  Using alcohol-based hand   Alcohol-based hand  are also a good choice. Use them when you can't use soap and water. Follow these steps:  · Squeeze about a tablespoon of gel into the palm of one hand.  · Rub your hands together briskly, cleaning the backs of your hands, the palms, between your fingers, and up the wrists.  · Rub until the gel is gone and your hands are completely dry.  Preventing the flu in healthcare settings  The flu is a special concern for people in hospitals and long-term care facilities. To help prevent the spread of flu, many hospitals and nursing homes take these steps:  · Healthcare providers wash their hands or use an alcohol-based hand  before and after treating each patient.  · People with the flu have private rooms and bathrooms or share a room with someone with the same infection.  · People who are at high risk for the flu but don't have it are encouraged to get the flu and pneumonia vaccines.  · All healthcare workers are encouraged or required to get flu shots.   Date Last Reviewed: 12/1/2016  © 9588-3138 [employer]. [address] [place] [address], [place] [address]. All rights reserved. This information is not intended as a substitute for professional medical care. Always follow your healthcare professional's instructions.

## 2019-03-21 ENCOUNTER — ANESTHESIA EVENT (OUTPATIENT)
Dept: ENDOSCOPY | Facility: HOSPITAL | Age: 54
DRG: 417 | End: 2019-03-21
Payer: OTHER GOVERNMENT

## 2019-03-21 ENCOUNTER — ANESTHESIA (OUTPATIENT)
Dept: ENDOSCOPY | Facility: HOSPITAL | Age: 54
DRG: 417 | End: 2019-03-21
Payer: OTHER GOVERNMENT

## 2019-03-21 ENCOUNTER — HOSPITAL ENCOUNTER (INPATIENT)
Facility: HOSPITAL | Age: 54
LOS: 6 days | Discharge: HOME | DRG: 417 | End: 2019-03-27
Attending: EMERGENCY MEDICINE | Admitting: INTERNAL MEDICINE
Payer: OTHER GOVERNMENT

## 2019-03-21 DIAGNOSIS — K85.10 ACUTE GALLSTONE PANCREATITIS: Primary | ICD-10-CM

## 2019-03-21 DIAGNOSIS — R10.9 ABDOMINAL PAIN, UNSPECIFIED ABDOMINAL LOCATION: ICD-10-CM

## 2019-03-21 DIAGNOSIS — K85.90 ACUTE PANCREATITIS, UNSPECIFIED COMPLICATION STATUS, UNSPECIFIED PANCREATITIS TYPE: ICD-10-CM

## 2019-03-21 DIAGNOSIS — R11.10 VOMITING: ICD-10-CM

## 2019-03-21 PROBLEM — K25.3 ACUTE GASTRIC ULCER WITHOUT HEMORRHAGE OR PERFORATION: Status: ACTIVE | Noted: 2019-03-21

## 2019-03-21 PROBLEM — K83.09 ASCENDING CHOLANGITIS: Status: ACTIVE | Noted: 2019-03-21

## 2019-03-21 PROBLEM — E66.9 OBESITY: Status: ACTIVE | Noted: 2019-03-21

## 2019-03-21 PROBLEM — I10 ESSENTIAL HYPERTENSION: Status: ACTIVE | Noted: 2019-03-21

## 2019-03-21 PROBLEM — E78.5 HYPERLIPIDEMIA: Status: ACTIVE | Noted: 2019-03-21

## 2019-03-21 LAB
ALBUMIN SERPL BCP-MCNC: 4.1 G/DL
ALP SERPL-CCNC: 180 U/L
ALT SERPL W/O P-5'-P-CCNC: 404 U/L
ANION GAP SERPL CALC-SCNC: 8 MMOL/L
AST SERPL-CCNC: 677 U/L
BASOPHILS # BLD AUTO: 0.03 K/UL
BASOPHILS NFR BLD: 0.4 %
BILIRUB SERPL-MCNC: 0.9 MG/DL
BILIRUB UR QL STRIP: NEGATIVE
BNP SERPL-MCNC: 15 PG/ML
BUN SERPL-MCNC: 10 MG/DL
CALCIUM SERPL-MCNC: 10.5 MG/DL
CHLORIDE SERPL-SCNC: 104 MMOL/L
CHOLEST SERPL-MCNC: 250 MG/DL
CHOLEST/HDLC SERPL: 3.6 {RATIO}
CLARITY UR: CLEAR
CO2 SERPL-SCNC: 26 MMOL/L
COLOR UR: YELLOW
CREAT SERPL-MCNC: 0.8 MG/DL
DIFFERENTIAL METHOD: ABNORMAL
EOSINOPHIL # BLD AUTO: 0.1 K/UL
EOSINOPHIL NFR BLD: 1.5 %
ERYTHROCYTE [DISTWIDTH] IN BLOOD BY AUTOMATED COUNT: 13.8 %
EST. GFR  (AFRICAN AMERICAN): >60 ML/MIN/1.73 M^2
EST. GFR  (NON AFRICAN AMERICAN): >60 ML/MIN/1.73 M^2
GLUCOSE SERPL-MCNC: 98 MG/DL
GLUCOSE UR QL STRIP: NEGATIVE
HCT VFR BLD AUTO: 44.4 %
HDLC SERPL-MCNC: 69 MG/DL
HDLC SERPL: 27.6 %
HGB BLD-MCNC: 14.4 G/DL
HGB UR QL STRIP: NEGATIVE
KETONES UR QL STRIP: NEGATIVE
LDLC SERPL CALC-MCNC: 153.2 MG/DL
LEUKOCYTE ESTERASE UR QL STRIP: NEGATIVE
LIPASE SERPL-CCNC: >1000 U/L
LYMPHOCYTES # BLD AUTO: 1.5 K/UL
LYMPHOCYTES NFR BLD: 19.2 %
MCH RBC QN AUTO: 28 PG
MCHC RBC AUTO-ENTMCNC: 32.4 G/DL
MCV RBC AUTO: 86 FL
MONOCYTES # BLD AUTO: 0.7 K/UL
MONOCYTES NFR BLD: 8.7 %
NEUTROPHILS # BLD AUTO: 5.6 K/UL
NEUTROPHILS NFR BLD: 70.2 %
NITRITE UR QL STRIP: NEGATIVE
NONHDLC SERPL-MCNC: 181 MG/DL
PH UR STRIP: 5 [PH] (ref 5–8)
PLATELET # BLD AUTO: 360 K/UL
PMV BLD AUTO: 10.1 FL
POTASSIUM SERPL-SCNC: 3.6 MMOL/L
PROT SERPL-MCNC: 7.8 G/DL
PROT UR QL STRIP: NEGATIVE
RBC # BLD AUTO: 5.14 M/UL
SODIUM SERPL-SCNC: 138 MMOL/L
SP GR UR STRIP: >1.03 (ref 1–1.03)
TRIGL SERPL-MCNC: 139 MG/DL
TROPONIN I SERPL DL<=0.01 NG/ML-MCNC: 0.01 NG/ML
URN SPEC COLLECT METH UR: ABNORMAL
UROBILINOGEN UR STRIP-ACNC: ABNORMAL EU/DL
WBC # BLD AUTO: 8.03 K/UL

## 2019-03-21 PROCEDURE — 96375 TX/PRO/DX INJ NEW DRUG ADDON: CPT

## 2019-03-21 PROCEDURE — 96376 TX/PRO/DX INJ SAME DRUG ADON: CPT

## 2019-03-21 PROCEDURE — 25000003 PHARM REV CODE 250: Performed by: INTERNAL MEDICINE

## 2019-03-21 PROCEDURE — 85025 COMPLETE CBC W/AUTO DIFF WBC: CPT

## 2019-03-21 PROCEDURE — D9220A PRA ANESTHESIA: ICD-10-PCS | Mod: CRNA,,, | Performed by: NURSE ANESTHETIST, CERTIFIED REGISTERED

## 2019-03-21 PROCEDURE — 25000003 PHARM REV CODE 250: Performed by: EMERGENCY MEDICINE

## 2019-03-21 PROCEDURE — 96374 THER/PROPH/DIAG INJ IV PUSH: CPT

## 2019-03-21 PROCEDURE — 63600175 PHARM REV CODE 636 W HCPCS: Mod: JG

## 2019-03-21 PROCEDURE — 63600175 PHARM REV CODE 636 W HCPCS: Performed by: NURSE ANESTHETIST, CERTIFIED REGISTERED

## 2019-03-21 PROCEDURE — 43264 ERCP REMOVE DUCT CALCULI: CPT | Performed by: INTERNAL MEDICINE

## 2019-03-21 PROCEDURE — 81003 URINALYSIS AUTO W/O SCOPE: CPT

## 2019-03-21 PROCEDURE — 27201674 HC SPHINCTERTOME: Performed by: INTERNAL MEDICINE

## 2019-03-21 PROCEDURE — 80061 LIPID PANEL: CPT

## 2019-03-21 PROCEDURE — 99285 EMERGENCY DEPT VISIT HI MDM: CPT | Mod: 25

## 2019-03-21 PROCEDURE — 25500020 PHARM REV CODE 255

## 2019-03-21 PROCEDURE — 63600175 PHARM REV CODE 636 W HCPCS: Performed by: EMERGENCY MEDICINE

## 2019-03-21 PROCEDURE — 96361 HYDRATE IV INFUSION ADD-ON: CPT

## 2019-03-21 PROCEDURE — 25500020 PHARM REV CODE 255: Performed by: EMERGENCY MEDICINE

## 2019-03-21 PROCEDURE — 37000008 HC ANESTHESIA 1ST 15 MINUTES: Performed by: INTERNAL MEDICINE

## 2019-03-21 PROCEDURE — 63600175 PHARM REV CODE 636 W HCPCS: Performed by: HOSPITALIST

## 2019-03-21 PROCEDURE — C1769 GUIDE WIRE: HCPCS | Performed by: INTERNAL MEDICINE

## 2019-03-21 PROCEDURE — 37000009 HC ANESTHESIA EA ADD 15 MINS: Performed by: INTERNAL MEDICINE

## 2019-03-21 PROCEDURE — 25000003 PHARM REV CODE 250: Performed by: NURSE ANESTHETIST, CERTIFIED REGISTERED

## 2019-03-21 PROCEDURE — 27200999 HC RETRIEVAL BALLOON, ERCP: Performed by: INTERNAL MEDICINE

## 2019-03-21 PROCEDURE — 25000003 PHARM REV CODE 250: Performed by: HOSPITALIST

## 2019-03-21 PROCEDURE — 11000001 HC ACUTE MED/SURG PRIVATE ROOM

## 2019-03-21 PROCEDURE — 43262 ENDO CHOLANGIOPANCREATOGRAPH: CPT | Performed by: INTERNAL MEDICINE

## 2019-03-21 PROCEDURE — D9220A PRA ANESTHESIA: Mod: ANES,,, | Performed by: ANESTHESIOLOGY

## 2019-03-21 PROCEDURE — 93010 EKG 12-LEAD: ICD-10-PCS | Mod: ,,, | Performed by: INTERNAL MEDICINE

## 2019-03-21 PROCEDURE — 93005 ELECTROCARDIOGRAM TRACING: CPT

## 2019-03-21 PROCEDURE — 83690 ASSAY OF LIPASE: CPT

## 2019-03-21 PROCEDURE — 83880 ASSAY OF NATRIURETIC PEPTIDE: CPT

## 2019-03-21 PROCEDURE — 80053 COMPREHEN METABOLIC PANEL: CPT

## 2019-03-21 PROCEDURE — 93010 ELECTROCARDIOGRAM REPORT: CPT | Mod: ,,, | Performed by: INTERNAL MEDICINE

## 2019-03-21 PROCEDURE — D9220A PRA ANESTHESIA: Mod: CRNA,,, | Performed by: NURSE ANESTHETIST, CERTIFIED REGISTERED

## 2019-03-21 PROCEDURE — 94761 N-INVAS EAR/PLS OXIMETRY MLT: CPT

## 2019-03-21 PROCEDURE — D9220A PRA ANESTHESIA: ICD-10-PCS | Mod: ANES,,, | Performed by: ANESTHESIOLOGY

## 2019-03-21 PROCEDURE — 84484 ASSAY OF TROPONIN QUANT: CPT

## 2019-03-21 RX ORDER — PANTOPRAZOLE SODIUM 40 MG/1
40 TABLET, DELAYED RELEASE ORAL 2 TIMES DAILY
Status: DISCONTINUED | OUTPATIENT
Start: 2019-03-21 | End: 2019-03-27 | Stop reason: HOSPADM

## 2019-03-21 RX ORDER — HYDROMORPHONE HYDROCHLORIDE 2 MG/ML
2 INJECTION, SOLUTION INTRAMUSCULAR; INTRAVENOUS; SUBCUTANEOUS EVERY 4 HOURS PRN
Status: DISCONTINUED | OUTPATIENT
Start: 2019-03-21 | End: 2019-03-23

## 2019-03-21 RX ORDER — FENTANYL CITRATE 50 UG/ML
INJECTION, SOLUTION INTRAMUSCULAR; INTRAVENOUS
Status: DISCONTINUED | OUTPATIENT
Start: 2019-03-21 | End: 2019-03-21

## 2019-03-21 RX ORDER — NEOSTIGMINE METHYLSULFATE 1 MG/ML
INJECTION, SOLUTION INTRAVENOUS
Status: DISCONTINUED | OUTPATIENT
Start: 2019-03-21 | End: 2019-03-21

## 2019-03-21 RX ORDER — LIDOCAINE HYDROCHLORIDE 20 MG/ML
INJECTION, SOLUTION EPIDURAL; INFILTRATION; INTRACAUDAL; PERINEURAL
Status: DISPENSED
Start: 2019-03-21 | End: 2019-03-22

## 2019-03-21 RX ORDER — PROPOFOL 10 MG/ML
VIAL (ML) INTRAVENOUS
Status: DISCONTINUED | OUTPATIENT
Start: 2019-03-21 | End: 2019-03-21

## 2019-03-21 RX ORDER — ALBUTEROL SULFATE 90 UG/1
2 AEROSOL, METERED RESPIRATORY (INHALATION) EVERY 4 HOURS PRN
Status: DISCONTINUED | OUTPATIENT
Start: 2019-03-21 | End: 2019-03-27 | Stop reason: HOSPADM

## 2019-03-21 RX ORDER — ACETAMINOPHEN 325 MG/1
650 TABLET ORAL EVERY 8 HOURS PRN
Status: DISCONTINUED | OUTPATIENT
Start: 2019-03-21 | End: 2019-03-25

## 2019-03-21 RX ORDER — METOCLOPRAMIDE HYDROCHLORIDE 5 MG/ML
INJECTION INTRAMUSCULAR; INTRAVENOUS
Status: DISCONTINUED | OUTPATIENT
Start: 2019-03-21 | End: 2019-03-21

## 2019-03-21 RX ORDER — GLYCOPYRROLATE 0.2 MG/ML
INJECTION INTRAMUSCULAR; INTRAVENOUS
Status: DISCONTINUED | OUTPATIENT
Start: 2019-03-21 | End: 2019-03-21

## 2019-03-21 RX ORDER — DIPHENHYDRAMINE HYDROCHLORIDE 50 MG/ML
25 INJECTION INTRAMUSCULAR; INTRAVENOUS EVERY 4 HOURS PRN
Status: DISCONTINUED | OUTPATIENT
Start: 2019-03-21 | End: 2019-03-27

## 2019-03-21 RX ORDER — ONDANSETRON 2 MG/ML
4 INJECTION INTRAMUSCULAR; INTRAVENOUS
Status: COMPLETED | OUTPATIENT
Start: 2019-03-21 | End: 2019-03-21

## 2019-03-21 RX ORDER — HYDROMORPHONE HYDROCHLORIDE 2 MG/ML
1 INJECTION, SOLUTION INTRAMUSCULAR; INTRAVENOUS; SUBCUTANEOUS
Status: COMPLETED | OUTPATIENT
Start: 2019-03-21 | End: 2019-03-21

## 2019-03-21 RX ORDER — NEOSTIGMINE METHYLSULFATE 1 MG/ML
INJECTION, SOLUTION INTRAVENOUS
Status: COMPLETED
Start: 2019-03-21 | End: 2019-03-21

## 2019-03-21 RX ORDER — DIPHENHYDRAMINE HYDROCHLORIDE 50 MG/ML
25 INJECTION INTRAMUSCULAR; INTRAVENOUS
Status: COMPLETED | OUTPATIENT
Start: 2019-03-21 | End: 2019-03-21

## 2019-03-21 RX ORDER — POLYETHYLENE GLYCOL 3350 17 G/17G
17 POWDER, FOR SOLUTION ORAL DAILY
Status: DISCONTINUED | OUTPATIENT
Start: 2019-03-21 | End: 2019-03-27 | Stop reason: HOSPADM

## 2019-03-21 RX ORDER — SODIUM CHLORIDE 9 MG/ML
INJECTION, SOLUTION INTRAVENOUS CONTINUOUS
Status: DISCONTINUED | OUTPATIENT
Start: 2019-03-21 | End: 2019-03-21

## 2019-03-21 RX ORDER — INDOMETHACIN 50 MG/1
100 SUPPOSITORY RECTAL ONCE
Status: COMPLETED | OUTPATIENT
Start: 2019-03-21 | End: 2019-03-21

## 2019-03-21 RX ORDER — FAMOTIDINE 20 MG/1
20 TABLET, FILM COATED ORAL DAILY
Status: DISCONTINUED | OUTPATIENT
Start: 2019-03-21 | End: 2019-03-21

## 2019-03-21 RX ORDER — LIDOCAINE HCL/PF 100 MG/5ML
SYRINGE (ML) INTRAVENOUS
Status: DISCONTINUED | OUTPATIENT
Start: 2019-03-21 | End: 2019-03-21

## 2019-03-21 RX ORDER — MIDAZOLAM HYDROCHLORIDE 1 MG/ML
INJECTION, SOLUTION INTRAMUSCULAR; INTRAVENOUS
Status: DISCONTINUED | OUTPATIENT
Start: 2019-03-21 | End: 2019-03-21

## 2019-03-21 RX ORDER — PROPOFOL 10 MG/ML
VIAL (ML) INTRAVENOUS
Status: COMPLETED
Start: 2019-03-21 | End: 2019-03-21

## 2019-03-21 RX ORDER — SODIUM CHLORIDE 0.9 % (FLUSH) 0.9 %
3 SYRINGE (ML) INJECTION
Status: DISCONTINUED | OUTPATIENT
Start: 2019-03-21 | End: 2019-03-27 | Stop reason: HOSPADM

## 2019-03-21 RX ORDER — ONDANSETRON 2 MG/ML
INJECTION INTRAMUSCULAR; INTRAVENOUS
Status: COMPLETED
Start: 2019-03-21 | End: 2019-03-21

## 2019-03-21 RX ORDER — HYDROMORPHONE HYDROCHLORIDE 2 MG/ML
1 INJECTION, SOLUTION INTRAMUSCULAR; INTRAVENOUS; SUBCUTANEOUS EVERY 4 HOURS PRN
Status: DISCONTINUED | OUTPATIENT
Start: 2019-03-21 | End: 2019-03-23

## 2019-03-21 RX ORDER — ONDANSETRON 2 MG/ML
4 INJECTION INTRAMUSCULAR; INTRAVENOUS EVERY 4 HOURS PRN
Status: DISCONTINUED | OUTPATIENT
Start: 2019-03-21 | End: 2019-03-27 | Stop reason: HOSPADM

## 2019-03-21 RX ORDER — AMOXICILLIN 250 MG
1 CAPSULE ORAL 2 TIMES DAILY
Status: DISCONTINUED | OUTPATIENT
Start: 2019-03-21 | End: 2019-03-22

## 2019-03-21 RX ORDER — SUCCINYLCHOLINE CHLORIDE 20 MG/ML
INJECTION INTRAMUSCULAR; INTRAVENOUS
Status: COMPLETED
Start: 2019-03-21 | End: 2019-03-21

## 2019-03-21 RX ORDER — MIDAZOLAM HYDROCHLORIDE 1 MG/ML
INJECTION INTRAMUSCULAR; INTRAVENOUS
Status: COMPLETED
Start: 2019-03-21 | End: 2019-03-21

## 2019-03-21 RX ORDER — SODIUM CHLORIDE 9 MG/ML
INJECTION, SOLUTION INTRAVENOUS CONTINUOUS
Status: DISCONTINUED | OUTPATIENT
Start: 2019-03-21 | End: 2019-03-25

## 2019-03-21 RX ORDER — ROCURONIUM BROMIDE 10 MG/ML
INJECTION, SOLUTION INTRAVENOUS
Status: COMPLETED
Start: 2019-03-21 | End: 2019-03-21

## 2019-03-21 RX ORDER — CIPROFLOXACIN 2 MG/ML
400 INJECTION, SOLUTION INTRAVENOUS
Status: DISCONTINUED | OUTPATIENT
Start: 2019-03-21 | End: 2019-03-27 | Stop reason: HOSPADM

## 2019-03-21 RX ORDER — ROCURONIUM BROMIDE 10 MG/ML
INJECTION, SOLUTION INTRAVENOUS
Status: DISCONTINUED | OUTPATIENT
Start: 2019-03-21 | End: 2019-03-21

## 2019-03-21 RX ORDER — METOCLOPRAMIDE HYDROCHLORIDE 5 MG/ML
INJECTION INTRAMUSCULAR; INTRAVENOUS
Status: DISPENSED
Start: 2019-03-21 | End: 2019-03-22

## 2019-03-21 RX ORDER — GLYCOPYRROLATE 0.2 MG/ML
INJECTION INTRAMUSCULAR; INTRAVENOUS
Status: DISPENSED
Start: 2019-03-21 | End: 2019-03-22

## 2019-03-21 RX ORDER — FENTANYL CITRATE 50 UG/ML
INJECTION, SOLUTION INTRAMUSCULAR; INTRAVENOUS
Status: COMPLETED
Start: 2019-03-21 | End: 2019-03-21

## 2019-03-21 RX ORDER — SODIUM CHLORIDE, SODIUM LACTATE, POTASSIUM CHLORIDE, CALCIUM CHLORIDE 600; 310; 30; 20 MG/100ML; MG/100ML; MG/100ML; MG/100ML
1000 INJECTION, SOLUTION INTRAVENOUS
Status: COMPLETED | OUTPATIENT
Start: 2019-03-21 | End: 2019-03-21

## 2019-03-21 RX ORDER — MORPHINE SULFATE 10 MG/ML
8 INJECTION INTRAMUSCULAR; INTRAVENOUS; SUBCUTANEOUS
Status: COMPLETED | OUTPATIENT
Start: 2019-03-21 | End: 2019-03-21

## 2019-03-21 RX ORDER — METOCLOPRAMIDE HYDROCHLORIDE 5 MG/ML
INJECTION INTRAMUSCULAR; INTRAVENOUS
Status: COMPLETED
Start: 2019-03-21 | End: 2019-03-21

## 2019-03-21 RX ORDER — ONDANSETRON 2 MG/ML
INJECTION INTRAMUSCULAR; INTRAVENOUS
Status: DISCONTINUED | OUTPATIENT
Start: 2019-03-21 | End: 2019-03-21

## 2019-03-21 RX ORDER — MORPHINE SULFATE 10 MG/ML
4 INJECTION INTRAMUSCULAR; INTRAVENOUS; SUBCUTANEOUS
Status: COMPLETED | OUTPATIENT
Start: 2019-03-21 | End: 2019-03-21

## 2019-03-21 RX ORDER — VALSARTAN 80 MG/1
160 TABLET ORAL 2 TIMES DAILY
Status: DISCONTINUED | OUTPATIENT
Start: 2019-03-21 | End: 2019-03-27 | Stop reason: HOSPADM

## 2019-03-21 RX ORDER — GLUCAGON 1 MG
KIT INJECTION
Status: COMPLETED
Start: 2019-03-21 | End: 2019-03-21

## 2019-03-21 RX ORDER — SUCCINYLCHOLINE CHLORIDE 20 MG/ML
INJECTION INTRAMUSCULAR; INTRAVENOUS
Status: DISCONTINUED | OUTPATIENT
Start: 2019-03-21 | End: 2019-03-21

## 2019-03-21 RX ORDER — SODIUM CHLORIDE 0.9 % (FLUSH) 0.9 %
5 SYRINGE (ML) INJECTION
Status: DISCONTINUED | OUTPATIENT
Start: 2019-03-21 | End: 2019-03-27 | Stop reason: HOSPADM

## 2019-03-21 RX ORDER — GLYCOPYRROLATE 0.2 MG/ML
INJECTION INTRAMUSCULAR; INTRAVENOUS
Status: COMPLETED
Start: 2019-03-21 | End: 2019-03-21

## 2019-03-21 RX ADMIN — LIDOCAINE HYDROCHLORIDE 100 MG: 20 INJECTION, SOLUTION INTRAVENOUS at 03:03

## 2019-03-21 RX ADMIN — POLYETHYLENE GLYCOL 3350 17 G: 17 POWDER, FOR SOLUTION ORAL at 08:03

## 2019-03-21 RX ADMIN — DIPHENHYDRAMINE HYDROCHLORIDE 25 MG: 50 INJECTION, SOLUTION INTRAMUSCULAR; INTRAVENOUS at 08:03

## 2019-03-21 RX ADMIN — GLYCOPYRROLATE 0.1 MG: 0.2 INJECTION, SOLUTION INTRAMUSCULAR; INTRAVENOUS at 04:03

## 2019-03-21 RX ADMIN — HYDROMORPHONE HYDROCHLORIDE 1 MG: 2 INJECTION INTRAMUSCULAR; INTRAVENOUS; SUBCUTANEOUS at 09:03

## 2019-03-21 RX ADMIN — GLYCOPYRROLATE 0.1 MG: 0.2 INJECTION, SOLUTION INTRAMUSCULAR; INTRAVENOUS at 03:03

## 2019-03-21 RX ADMIN — MORPHINE SULFATE 4 MG: 10 INJECTION INTRAVENOUS at 02:03

## 2019-03-21 RX ADMIN — SODIUM CHLORIDE 1000 ML: 0.9 INJECTION, SOLUTION INTRAVENOUS at 04:03

## 2019-03-21 RX ADMIN — ONDANSETRON 4 MG: 2 INJECTION INTRAMUSCULAR; INTRAVENOUS at 04:03

## 2019-03-21 RX ADMIN — DIPHENHYDRAMINE HYDROCHLORIDE 25 MG: 50 INJECTION, SOLUTION INTRAMUSCULAR; INTRAVENOUS at 09:03

## 2019-03-21 RX ADMIN — METOCLOPRAMIDE 10 MG: 5 INJECTION, SOLUTION INTRAMUSCULAR; INTRAVENOUS at 03:03

## 2019-03-21 RX ADMIN — CIPROFLOXACIN 400 MG: 2 INJECTION, SOLUTION INTRAVENOUS at 06:03

## 2019-03-21 RX ADMIN — SODIUM CHLORIDE: 0.9 INJECTION, SOLUTION INTRAVENOUS at 11:03

## 2019-03-21 RX ADMIN — SODIUM CHLORIDE, SODIUM LACTATE, POTASSIUM CHLORIDE, AND CALCIUM CHLORIDE 1000 ML: .6; .31; .03; .02 INJECTION, SOLUTION INTRAVENOUS at 05:03

## 2019-03-21 RX ADMIN — VALSARTAN 160 MG: 80 TABLET, FILM COATED ORAL at 08:03

## 2019-03-21 RX ADMIN — ROCURONIUM BROMIDE 5 MG: 10 INJECTION, SOLUTION INTRAVENOUS at 03:03

## 2019-03-21 RX ADMIN — DIPHENHYDRAMINE HYDROCHLORIDE 25 MG: 50 INJECTION INTRAMUSCULAR; INTRAVENOUS at 05:03

## 2019-03-21 RX ADMIN — DOCUSATE SODIUM AND SENNOSIDES 1 TABLET: 8.6; 5 TABLET, FILM COATED ORAL at 08:03

## 2019-03-21 RX ADMIN — SODIUM CHLORIDE, SODIUM LACTATE, POTASSIUM CHLORIDE, AND CALCIUM CHLORIDE 500 ML: .6; .31; .03; .02 INJECTION, SOLUTION INTRAVENOUS at 03:03

## 2019-03-21 RX ADMIN — MORPHINE SULFATE 8 MG: 10 INJECTION INTRAVENOUS at 03:03

## 2019-03-21 RX ADMIN — PROPOFOL 200 MG: 10 INJECTION, EMULSION INTRAVENOUS at 03:03

## 2019-03-21 RX ADMIN — SUCCINYLCHOLINE CHLORIDE 120 MG: 20 INJECTION, SOLUTION INTRAMUSCULAR; INTRAVENOUS at 03:03

## 2019-03-21 RX ADMIN — ONDANSETRON 4 MG: 2 INJECTION INTRAMUSCULAR; INTRAVENOUS at 02:03

## 2019-03-21 RX ADMIN — NEOSTIGMINE METHYLSULFATE 2 MG: 1 INJECTION INTRAVENOUS at 04:03

## 2019-03-21 RX ADMIN — ONDANSETRON 4 MG: 2 INJECTION, SOLUTION INTRAMUSCULAR; INTRAVENOUS at 03:03

## 2019-03-21 RX ADMIN — SODIUM CHLORIDE 1000 ML: 0.9 INJECTION, SOLUTION INTRAVENOUS at 02:03

## 2019-03-21 RX ADMIN — FENTANYL CITRATE 100 MCG: 50 INJECTION INTRAMUSCULAR; INTRAVENOUS at 03:03

## 2019-03-21 RX ADMIN — PANTOPRAZOLE SODIUM 40 MG: 40 TABLET, DELAYED RELEASE ORAL at 08:03

## 2019-03-21 RX ADMIN — HYDROMORPHONE HYDROCHLORIDE 1 MG: 2 INJECTION INTRAMUSCULAR; INTRAVENOUS; SUBCUTANEOUS at 08:03

## 2019-03-21 RX ADMIN — GLUCAGON 1 MG: 1 INJECTION, POWDER, LYOPHILIZED, FOR SOLUTION INTRAMUSCULAR; INTRAVENOUS at 03:03

## 2019-03-21 RX ADMIN — HYDROMORPHONE HYDROCHLORIDE 1 MG: 2 INJECTION, SOLUTION INTRAMUSCULAR; INTRAVENOUS; SUBCUTANEOUS at 04:03

## 2019-03-21 RX ADMIN — GLYCOPYRROLATE 0.2 MG: 0.2 INJECTION, SOLUTION INTRAMUSCULAR; INTRAVENOUS at 04:03

## 2019-03-21 RX ADMIN — INDOMETHACIN 100 MG: 50 SUPPOSITORY RECTAL at 04:03

## 2019-03-21 RX ADMIN — MIDAZOLAM HYDROCHLORIDE 2 MG: 1 INJECTION, SOLUTION INTRAMUSCULAR; INTRAVENOUS at 03:03

## 2019-03-21 RX ADMIN — IOHEXOL 80 ML: 350 INJECTION, SOLUTION INTRAVENOUS at 03:03

## 2019-03-21 RX ADMIN — IOHEXOL 34 ML: 300 INJECTION, SOLUTION INTRAVENOUS at 01:03

## 2019-03-21 RX ADMIN — ROCURONIUM BROMIDE 25 MG: 10 INJECTION, SOLUTION INTRAVENOUS at 03:03

## 2019-03-21 RX ADMIN — SODIUM CHLORIDE, SODIUM LACTATE, POTASSIUM CHLORIDE, AND CALCIUM CHLORIDE 300 ML: .6; .31; .03; .02 INJECTION, SOLUTION INTRAVENOUS at 04:03

## 2019-03-21 RX ADMIN — SODIUM CHLORIDE, SODIUM LACTATE, POTASSIUM CHLORIDE, AND CALCIUM CHLORIDE: .6; .31; .03; .02 INJECTION, SOLUTION INTRAVENOUS at 03:03

## 2019-03-21 RX ADMIN — SODIUM CHLORIDE, SODIUM LACTATE, POTASSIUM CHLORIDE, AND CALCIUM CHLORIDE 500 ML: .6; .31; .03; .02 INJECTION, SOLUTION INTRAVENOUS at 04:03

## 2019-03-21 NOTE — NURSING
Pt returned to unit on stretcher,NADN,no c/opain,able to move all extremities well,assisted to bed,bolus lactated ringer  Infusing iv site clear.continue monitoring.

## 2019-03-21 NOTE — OR NURSING
1612 Scope out. Several sweep made throughout procedure. Stone remove from bile duct. Airway, sedation, vital signs maintained by Anesthesia

## 2019-03-21 NOTE — H&P
[facility] Hospital Medicine  History & Physical    Patient Name: Dea Ravi  MRN: [id number]  Admission Date: 3/21/2019  Attending Physician: Erin Riddle MD   Primary Care Provider: Primary Doctor No         Patient information was obtained from patient, parent, past medical records and ER records.     Subjective:     Principal Problem:Acute gallstone pancreatitis    Chief Complaint:   Chief Complaint   Patient presents with    Abdominal Pain     pt complains had sudden onset of abd cramping that has turned into constant pain. vomited once . states pain goes through to back        HPI: Ms Dea Ravi is a 53 y.o. woman with HTN and obesity who presents for abdominal pain. She developed 9.5/10 crampy then sharp abdominal pain located across the upper part of her abdomen with no radiation after eating cereal last night. Nothing makes it better (other than pain meds) or worse. Associated with nausea and vomiting. No hematemesis. No fevers or chills. No change in BMs or urination. She has had intermittent crampy pain after eating cereal, corn, nuts (not fatty foods) for months. She has been told that she had gallbladder problems in the past. No alcohol. Takes valsartan only at home; no statin.     In ED, found to have biliary pancreatitis. Given IVF and pain control.     Past Medical History:   Diagnosis Date    High cholesterol     Hypertension     Thyroid disease        Past Surgical History:   Procedure Laterality Date    WISDOM TOOTH EXTRACTION         Review of patient's allergies indicates:  No Known Allergies    No current facility-administered medications on file prior to encounter.      Current Outpatient Medications on File Prior to Encounter   Medication Sig    albuterol 90 mcg/actuation inhaler Inhale 2 puffs into the lungs every 6 (six) hours as needed for Wheezing. Rescue    valsartan (DIOVAN) 80 MG tablet Take 160 mg by mouth 2 (two) times daily.      Family  History     Problem Relation (Age of Onset)    Cancer Mother    Diverticulitis Mother, Father        Tobacco Use    Smoking status: Never Smoker    Smokeless tobacco: Never Used   Substance and Sexual Activity    Alcohol use: No     Frequency: Never    Drug use: No    Sexual activity: Not on file     Review of Systems   Constitutional: Positive for appetite change. Negative for activity change, chills, fatigue and fever.   HENT: Negative for congestion, postnasal drip, sinus pressure, sinus pain, sore throat and trouble swallowing.    Eyes: Negative for visual disturbance.   Respiratory: Negative for cough, chest tightness, shortness of breath and wheezing.    Cardiovascular: Positive for chest pain (epigastric). Negative for palpitations and leg swelling.   Gastrointestinal: Positive for abdominal pain, nausea and vomiting. Negative for constipation and diarrhea.   Genitourinary: Negative for decreased urine volume, difficulty urinating, dysuria, frequency and urgency.   Musculoskeletal: Negative for arthralgias and myalgias.   Skin: Negative for rash and wound.   Neurological: Negative for dizziness, weakness, light-headedness, numbness and headaches.   Hematological: Negative for adenopathy.   Psychiatric/Behavioral: Negative for confusion.     Objective:     Vital Signs (Most Recent):  Temp: 97.5 °F (36.4 °C) (03/21/19 0529)  Pulse: 73 (03/21/19 0529)  Resp: 18 (03/21/19 0529)  BP: 129/75 (03/21/19 0529)  SpO2: 97 % (03/21/19 0712) Vital Signs (24h Range):  Temp:  [97.5 °F (36.4 °C)-97.9 °F (36.6 °C)] 97.5 °F (36.4 °C)  Pulse:  [71-81] 73  Resp:  [15-38] 18  SpO2:  [97 %-100 %] 97 %  BP: (116-166)/(69-83) 129/75     Weight: 90.7 kg (199 lb 15.3 oz)  Body mass index is 37.78 kg/m².    Physical Exam   Constitutional: She is oriented to person, place, and time. She appears well-developed and well-nourished. No distress.   HENT:   Head: Normocephalic and atraumatic.   Nose: Nose normal.   Mouth/Throat:  Oropharynx is clear and moist. No oropharyngeal exudate.   Eyes: Conjunctivae and EOM are normal. No scleral icterus.   Neck: Neck supple. No JVD present.   Cardiovascular: Normal rate, regular rhythm, normal heart sounds and intact distal pulses. Exam reveals no gallop and no friction rub.   No murmur heard.  Pulmonary/Chest: Effort normal and breath sounds normal. No stridor. No respiratory distress. She has no wheezes. She has no rales.   Abdominal: Bowel sounds are normal. She exhibits distension (epigastrium). She exhibits no mass. There is tenderness (RUQ, epigastrium, LUQ). There is no rebound and no guarding.   No CVA tenderness   Musculoskeletal: She exhibits no edema.   Lymphadenopathy:     She has no cervical adenopathy.   Neurological: She is alert and oriented to person, place, and time.   Skin: Skin is warm and dry. No rash noted. She is not diaphoretic. No erythema. No pallor.   Psychiatric: She has a normal mood and affect. Her behavior is normal.   Nursing note and vitals reviewed.        CRANIAL NERVES     CN III, IV, VI   Extraocular motions are normal.        Significant Labs: All pertinent labs within the past 24 hours have been reviewed.    Significant Imaging: I have reviewed and interpreted all pertinent imaging results/findings within the past 24 hours.    Assessment/Plan:     * Acute gallstone pancreatitis    Has characteristic pain, lipase >1000, and changes consistent with pancreatitis of biliary origin on CT  No alcohol use  Lipids elevated but no hypertriglyceridemia ()  Started on IVF and pain control- continue  GI consulted for ERCP  Gen Surg consulted for GB removal  Discussed diagnosis at length with patient and her mother        Class 2 obesity with body mass index (BMI) of 37.0 to 37.9 in adult    Body mass index is 37.78 kg/m².  Encourage weight loss       Hyperlipidemia      HDL 69   Dietary changes and follow up as outpatient        Essential  hypertension    Continue home valsartan         VTE Risk Mitigation (From admission, onward)        Ordered     IP VTE HIGH RISK PATIENT  Once      03/21/19 0521     Reason for No Pharmacological VTE Prophylaxis  Once      03/21/19 0521           6:04 PM  Patient seen after ERCP. Still has abdominal pain but wants to try ice chips. No nausea or vomiting. Vitals stable. Abdomen distended in RUQ and epigastrium. OK ice chips.       Erin Yoder MD  Department of Hospital Medicine   [facility]

## 2019-03-21 NOTE — HPI
54 yo F with 1 day hx of epigastric pain, and across her abdomen. She has previous episodes of epigastric discomfort in the past. Denies any previous episodes of pancreatitis. She denies acholic stools or hx of jaundice.

## 2019-03-21 NOTE — PLAN OF CARE
03/21/19 1200   Discharge Assessment   Assessment Type Discharge Planning Assessment  (pt off unit to procedure.  TN to follow up at a later time.)

## 2019-03-21 NOTE — ANESTHESIA PREPROCEDURE EVALUATION
03/21/2019  Dea Ravi is a 53 y.o., female.    Anesthesia Evaluation         Review of Systems  Anesthesia Hx:  No previous Anesthesia   Social:  Non-Smoker    Hematology/Oncology:  Hematology Normal   Oncology Normal     EENT/Dental:EENT/Dental Normal   Cardiovascular:   Hypertension    Pulmonary:  Pulmonary Normal    Renal/:  Renal/ Normal     Hepatic/GI:  Hepatic/GI Normal    Musculoskeletal:  Musculoskeletal Normal    Neurological:  Neurology Normal    Endocrine:  Endocrine Normal    Dermatological:  Skin Normal    Psych:  Psychiatric Normal           Physical Exam  General:  Well nourished    Airway/Jaw/Neck:  Airway Findings: Mallampati: II TM Distance: < 4 cm      Dental:  Dental Findings: (upper rt incisor bonded)   Chest/Lungs:  Chest/Lungs Clear    Heart/Vascular:  Heart Findings: Normal       Mental Status:  Mental Status Findings:  Cooperative, Alert and Oriented         Anesthesia Plan  Type of Anesthesia, risks & benefits discussed:  Anesthesia Type:  general  Patient's Preference:   Intra-op Monitoring Plan: standard ASA monitors  Intra-op Monitoring Plan Comments:   Post Op Pain Control Plan: multimodal analgesia, IV/PO Opioids PRN and per primary service following discharge from PACU  Post Op Pain Control Plan Comments:   Induction:    Beta Blocker:  Patient is not currently on a Beta-Blocker (No further documentation required).       Informed Consent: Patient understands risks and agrees with Anesthesia plan.  Questions answered. Anesthesia consent signed with patient.  ASA Score: 3     Day of Surgery Review of History & Physical:    H&P update referred to the provider.  H&P completed by Anesthesiologist.   Anesthesia Plan Notes: npo        Ready For Surgery From Anesthesia Perspective.

## 2019-03-21 NOTE — NURSING
Admit note  Arrived via wheelchair per transport, accompanied by spouse. Restarted lr@250 to right ac 20g. Patient stated acceptable pain level at 2/10. Reinforced instructions to remain npo as ordered. Had safety socks in place, ambulated to restroom without assistance. Surgery and GI consult called in ED, will continue to monitor for changes.

## 2019-03-21 NOTE — OR NURSING
Procedure completed. Recovery completed. Dr. Carrillo spoke with patient and spouse discussed results, spouse verbalized understanding. Patient slightly sedated when results discussed. Report called to primary care nurse. Patient AAOX3 at this time. Spouse in patient room awaiting on patient. Transport requested. 2 liter of LR continuing.

## 2019-03-21 NOTE — TRANSFER OF CARE
"Anesthesia Transfer of Care Note    Patient: Dea Ravi    Procedure(s) Performed: Procedure(s) (LRB):  ERCP, WITH SPHINCTEROTOMY (N/A)    Patient location: PACU    Anesthesia Type: general    Transport from OR: Transported from OR on room air with adequate spontaneous ventilation    Post pain: adequate analgesia    Post assessment: no apparent anesthetic complications and tolerated procedure well    Post vital signs: stable    Level of consciousness: awake, alert and oriented    Nausea/Vomiting: no nausea/vomiting    Complications: none    Transfer of care protocol was followed      Last vitals:   Visit Vitals  BP (!) 160/90   Pulse 92   Temp 36.9 °C (98.4 °F) (Oral)   Resp 16   Ht 5' 1" (1.549 m)   Wt 90.7 kg (199 lb 15.3 oz)   LMP 03/07/2019 (Approximate)   SpO2 (!) 93%   Breastfeeding? No   BMI 37.78 kg/m²     "

## 2019-03-21 NOTE — ED TRIAGE NOTES
pt complains had sudden onset of abd cramping that has turned into constant pain.pt reports eating cereal around 2000, spasmic, burning pain after. Pt reports x 2 times . states pain goes through to back) LBM yesterday. Pt reports taking motrin

## 2019-03-21 NOTE — SUBJECTIVE & OBJECTIVE
Past Medical History:   Diagnosis Date    High cholesterol     Hypertension     Thyroid disease        Past Surgical History:   Procedure Laterality Date    WISDOM TOOTH EXTRACTION         Review of patient's allergies indicates:  No Known Allergies    No current facility-administered medications on file prior to encounter.      Current Outpatient Medications on File Prior to Encounter   Medication Sig    albuterol 90 mcg/actuation inhaler Inhale 2 puffs into the lungs every 6 (six) hours as needed for Wheezing. Rescue    valsartan (DIOVAN) 80 MG tablet Take 160 mg by mouth 2 (two) times daily.      Family History     Problem Relation (Age of Onset)    Cancer Mother    Diverticulitis Mother, Father        Tobacco Use    Smoking status: Never Smoker    Smokeless tobacco: Never Used   Substance and Sexual Activity    Alcohol use: No     Frequency: Never    Drug use: No    Sexual activity: Not on file     Review of Systems   Constitutional: Positive for appetite change. Negative for activity change, chills, fatigue and fever.   HENT: Negative for congestion, postnasal drip, sinus pressure, sinus pain, sore throat and trouble swallowing.    Eyes: Negative for visual disturbance.   Respiratory: Negative for cough, chest tightness, shortness of breath and wheezing.    Cardiovascular: Positive for chest pain (epigastric). Negative for palpitations and leg swelling.   Gastrointestinal: Positive for abdominal pain, nausea and vomiting. Negative for constipation and diarrhea.   Genitourinary: Negative for decreased urine volume, difficulty urinating, dysuria, frequency and urgency.   Musculoskeletal: Negative for arthralgias and myalgias.   Skin: Negative for rash and wound.   Neurological: Negative for dizziness, weakness, light-headedness, numbness and headaches.   Hematological: Negative for adenopathy.   Psychiatric/Behavioral: Negative for confusion.     Objective:     Vital Signs (Most Recent):  Temp: 97.5 °F  (36.4 °C) (03/21/19 0529)  Pulse: 73 (03/21/19 0529)  Resp: 18 (03/21/19 0529)  BP: 129/75 (03/21/19 0529)  SpO2: 97 % (03/21/19 0712) Vital Signs (24h Range):  Temp:  [97.5 °F (36.4 °C)-97.9 °F (36.6 °C)] 97.5 °F (36.4 °C)  Pulse:  [71-81] 73  Resp:  [15-38] 18  SpO2:  [97 %-100 %] 97 %  BP: (116-166)/(69-83) 129/75     Weight: 90.7 kg (199 lb 15.3 oz)  Body mass index is 37.78 kg/m².    Physical Exam   Constitutional: She is oriented to person, place, and time. She appears well-developed and well-nourished. No distress.   HENT:   Head: Normocephalic and atraumatic.   Nose: Nose normal.   Mouth/Throat: Oropharynx is clear and moist. No oropharyngeal exudate.   Eyes: Conjunctivae and EOM are normal. No scleral icterus.   Neck: Neck supple. No JVD present.   Cardiovascular: Normal rate, regular rhythm, normal heart sounds and intact distal pulses. Exam reveals no gallop and no friction rub.   No murmur heard.  Pulmonary/Chest: Effort normal and breath sounds normal. No stridor. No respiratory distress. She has no wheezes. She has no rales.   Abdominal: Bowel sounds are normal. She exhibits distension (epigastrium). She exhibits no mass. There is tenderness (RUQ, epigastrium, LUQ). There is no rebound and no guarding.   No CVA tenderness   Musculoskeletal: She exhibits no edema.   Lymphadenopathy:     She has no cervical adenopathy.   Neurological: She is alert and oriented to person, place, and time.   Skin: Skin is warm and dry. No rash noted. She is not diaphoretic. No erythema. No pallor.   Psychiatric: She has a normal mood and affect. Her behavior is normal.   Nursing note and vitals reviewed.        CRANIAL NERVES     CN III, IV, VI   Extraocular motions are normal.        Significant Labs: All pertinent labs within the past 24 hours have been reviewed.    Significant Imaging: I have reviewed and interpreted all pertinent imaging results/findings within the past 24 hours.

## 2019-03-21 NOTE — ASSESSMENT & PLAN NOTE
NPO, IVF  MRCP with evidence of Choledocholithiasis  Await GI evaluation, will need clearance of duct.  Lap armando once pancreatitis resolves, prior to discharge.

## 2019-03-21 NOTE — OR NURSING
In Intra -op.  Patient Intubated by Anesthesia.  Placed in prone position.  Airway, vital signs, sedation, maintained by Anesthesia. Scope in by Dr. Carrillo.

## 2019-03-21 NOTE — CONSULTS
Gastroenterology Consult    3/21/2019 12:00 PM    Patient Name: Dea Ravi  MRN: [id number]  Admission Date: 3/21/2019  Hospital Length of Stay: 0 days  Code Status: Full Code   Primary Care Physician: Primary Doctor No  Principal Problem:Acute gallstone pancreatitis  Consulting Physician: Consults    Reason for consultation: gallstone pancreatitis    HPI:  Dea Ravi is a 53 y.o. female with a history of HTN, HLD and thyroid disease who presented with complaints of epigastric abdominal pain and associated N/V.  Initially the pain felt like spasms but then it became constant and radiated to the back.  She denies ever having pain like this before.  She has not had fevers.  Pain has been constant and was severe.  When I saw her, she had just gotten a dose of pain medication, which had helped with the pain.  She denies constipation, diarrhea, melena, hematochezia, or hematemesis.  She still has her gallbladder.    Past Medical History:   Diagnosis Date    High cholesterol     Hypertension     Thyroid disease        Past Surgical History:   Procedure Laterality Date    WISDOM TOOTH EXTRACTION         Social History     Socioeconomic History    Marital status:      Spouse name: None    Number of children: None    Years of education: None    Highest education level: None   Social Needs    Financial resource strain: None    Food insecurity - worry: None    Food insecurity - inability: None    Transportation needs - medical: None    Transportation needs - non-medical: None   Occupational History    None   Tobacco Use    Smoking status: Never Smoker    Smokeless tobacco: Never Used   Substance and Sexual Activity    Alcohol use: No     Frequency: Never    Drug use: No    Sexual activity: None   Other Topics Concern    None   Social History Narrative    None       Family History   Problem Relation Age of Onset    Cancer Mother         breast    Diverticulitis Mother     Diverticulitis  Father          Review of patient's allergies indicates:  No Known Allergies      Current Facility-Administered Medications:     0.9%  NaCl infusion, , Intravenous, Continuous, Erin Riddle MD, Last Rate: 150 mL/hr at 03/21/19 1118    acetaminophen tablet 650 mg, 650 mg, Oral, Q8H PRN, Sonal Alvarez MD    acetaminophen tablet 650 mg, 650 mg, Oral, Q8H PRN, Sonal Alvarez MD    albuterol inhaler 2 puff, 2 puff, Inhalation, Q4H PRN, Sonal Alvarez MD    diphenhydrAMINE injection 25 mg, 25 mg, Intravenous, Q4H PRN, Sonal Alvarez MD, 25 mg at 03/21/19 0911    hydromorphone (PF) injection 1 mg, 1 mg, Intravenous, Q4H PRN, Sonal Alvarez MD, 1 mg at 03/21/19 0911    hydromorphone (PF) injection 2 mg, 2 mg, Intravenous, Q4H PRN, Sonal Alvarez MD    ondansetron injection 4 mg, 4 mg, Intravenous, Q4H PRN, Sonal Alvarez MD    polyethylene glycol packet 17 g, 17 g, Oral, Daily, Sonal Alvarez MD, 17 g at 03/21/19 0846    promethazine (PHENERGAN) 6.25 mg in dextrose 5 % 50 mL IVPB, 6.25 mg, Intravenous, Q6H PRN, Sonal Alvarez MD    senna-docusate 8.6-50 mg per tablet 1 tablet, 1 tablet, Oral, BID, Sonal Alvarez MD, 1 tablet at 03/21/19 0847    sodium chloride 0.9% flush 5 mL, 5 mL, Intravenous, PRN, Sonal Alvarez MD    valsartan tablet 160 mg, 160 mg, Oral, BID, Sonal Alvarez MD, 160 mg at 03/21/19 0847      Review of Systems   Constitutional: Negative for chills, fever and weight loss.   HENT: Negative.    Eyes: Negative.    Respiratory: Negative.    Cardiovascular: Negative.    Gastrointestinal: Positive for abdominal pain, nausea and vomiting. Negative for blood in stool, constipation, diarrhea, heartburn and melena.   Genitourinary: Negative.    Musculoskeletal: Negative.    Skin: Negative.    Neurological: Negative.    Endo/Heme/Allergies: Negative.          /63 (BP Location: Left arm, Patient Position: Lying)   Pulse 74   Temp 97.7 °F (36.5  "°C) (Oral)   Resp 16   Ht 5' 1" (1.549 m)   Wt 90.7 kg (199 lb 15.3 oz)   LMP 03/07/2019 (Approximate)   SpO2 97%   Breastfeeding? No   BMI 37.78 kg/m²   Physical Exam   Constitutional: She is oriented to person, place, and time. She appears well-developed and well-nourished. No distress.   HENT:   Head: Normocephalic and atraumatic.   Mouth/Throat: Oropharynx is clear and moist.   Eyes: EOM are normal. Pupils are equal, round, and reactive to light. No scleral icterus.   Cardiovascular: Normal rate and regular rhythm.   No murmur heard.  Pulmonary/Chest: Effort normal and breath sounds normal. She has no wheezes.   Abdominal: Soft. Bowel sounds are normal. She exhibits no distension. There is tenderness (epigastric). There is no guarding.   Musculoskeletal: Normal range of motion. She exhibits no edema.   Neurological: She is alert and oriented to person, place, and time. No sensory deficit.   Skin: Skin is warm and dry. No rash noted.   Nursing note and vitals reviewed.      Labs:  Lab Results   Component Value Date/Time    WBC 8.03 03/21/2019 02:06 AM    HGB 14.4 03/21/2019 02:06 AM    HCT 44.4 03/21/2019 02:06 AM     (H) 03/21/2019 02:06 AM    MCV 86 03/21/2019 02:06 AM     03/21/2019 02:06 AM    K 3.6 03/21/2019 02:06 AM     03/21/2019 02:06 AM    CO2 26 03/21/2019 02:06 AM    BUN 10 03/21/2019 02:06 AM    CREATININE 0.8 03/21/2019 02:06 AM    GLU 98 03/21/2019 02:06 AM    CALCIUM 10.5 03/21/2019 02:06 AM    INR 1.4 (H) 02/10/2018 05:35 PM   ]  Lab Results   Component Value Date/Time    PROT 7.8 03/21/2019 02:06 AM    ALBUMIN 4.1 03/21/2019 02:06 AM    BILITOT 0.9 03/21/2019 02:06 AM     (H) 03/21/2019 02:06 AM     (H) 03/21/2019 02:06 AM    ALKPHOS 180 (H) 03/21/2019 02:06 AM   ]    Imaging and Procedures:  I personally reviewed the imaging/procedures below.  CT A/P 3/21/19:  Extensive peripancreatic inflammatory changes in keeping with acute pancreatitis.  No evidence " of necrosis or vascular complication.  Cholelithiasis with intra and extrahepatic biliary ductal dilation, raising the question of common bile duct stone or sludge.  Recommend correlation with serum bilirubin and clinical findings.  GI evaluation advised, with consideration for MRCP versus ERCP as clinically appropriate.    MRCP 3/21/19:  Examination mildly degraded by patient motion artifact.  Choledocholithiasis with dilatation of the intrahepatic and extrahepatic biliary tree.  Enlarged pancreas with surrounding inflammatory stranding in keeping with pancreatitis.  Numerous small stones in a contracted gallbladder.    Assessment:  Dea Ravi is a 53 y.o. female with a history of HTN, HLD and thyroid disease who presented with complaints of epigastric abdominal pain and associated N/V.  She was found to have gallstone pancreatitis with choledocholithiasis.     Plan:  - NPO  - ERCP today to removed CBD stones  - cholecystectomy per surgery  - IV fluid resuscitation with lactated ringers    Layla Carrillo MD

## 2019-03-21 NOTE — SUBJECTIVE & OBJECTIVE
No current facility-administered medications on file prior to encounter.      Current Outpatient Medications on File Prior to Encounter   Medication Sig    albuterol 90 mcg/actuation inhaler Inhale 2 puffs into the lungs every 6 (six) hours as needed for Wheezing. Rescue    valsartan (DIOVAN) 80 MG tablet Take 160 mg by mouth 2 (two) times daily.        Review of patient's allergies indicates:  No Known Allergies    Past Medical History:   Diagnosis Date    High cholesterol     Hypertension     Thyroid disease      Past Surgical History:   Procedure Laterality Date    WISDOM TOOTH EXTRACTION       Family History     Problem Relation (Age of Onset)    Cancer Mother    Diverticulitis Mother, Father        Tobacco Use    Smoking status: Never Smoker    Smokeless tobacco: Never Used   Substance and Sexual Activity    Alcohol use: No     Frequency: Never    Drug use: No    Sexual activity: Not on file     Review of Systems   Constitutional: Negative for chills and fever.   HENT: Negative for sore throat and trouble swallowing.    Eyes: Negative for redness and visual disturbance.   Respiratory: Negative for cough and chest tightness.    Cardiovascular: Negative for chest pain and palpitations.   Gastrointestinal: Negative for abdominal pain, constipation, diarrhea and nausea.   Endocrine: Negative for cold intolerance and heat intolerance.   Genitourinary: Negative for difficulty urinating and dysuria.   Musculoskeletal: Negative for back pain and joint swelling.   Skin: Negative for rash and wound.   Allergic/Immunologic: Negative for food allergies and immunocompromised state.   Neurological: Negative for dizziness and headaches.   Hematological: Negative for adenopathy. Does not bruise/bleed easily.   Psychiatric/Behavioral: Negative for agitation and behavioral problems.     Objective:     Vital Signs (Most Recent):  Temp: 97.5 °F (36.4 °C) (03/21/19 0529)  Pulse: 73 (03/21/19 0529)  Resp: 18 (03/21/19  0529)  BP: 129/75 (03/21/19 0529)  SpO2: 97 % (03/21/19 0712) Vital Signs (24h Range):  Temp:  [97.5 °F (36.4 °C)-97.9 °F (36.6 °C)] 97.5 °F (36.4 °C)  Pulse:  [71-81] 73  Resp:  [15-38] 18  SpO2:  [97 %-100 %] 97 %  BP: (116-166)/(69-83) 129/75     Weight: 90.7 kg (199 lb 15.3 oz)  Body mass index is 37.78 kg/m².    Physical Exam   Constitutional: No distress.   HENT:   Head: Normocephalic and atraumatic.   Eyes: No scleral icterus.   Neck: No tracheal deviation present.   Cardiovascular: Intact distal pulses.   Pulmonary/Chest: Effort normal.   Abdominal: Soft.   Tender to palpation epigastrium. Negative cuellar's   Musculoskeletal: She exhibits no deformity.   Neurological: She is alert.   Psychiatric: She has a normal mood and affect.       Significant Labs:  CBC:   Recent Labs   Lab 03/21/19  0206   WBC 8.03   RBC 5.14   HGB 14.4   HCT 44.4   *   MCV 86   MCH 28.0   MCHC 32.4     Lipase >1000    Significant Diagnostics:  CT: I have reviewed all pertinent results/findings within the past 24 hours and my personal findings are:  Pancreatitis, dilated intra and extrahepatic ducts

## 2019-03-21 NOTE — ASSESSMENT & PLAN NOTE
Has characteristic pain, lipase >1000, and changes consistent with pancreatitis of biliary origin on CT  No alcohol use  Lipids elevated but no hypertriglyceridemia ()  Started on IVF and pain control- continue  GI consulted for ERCP  Gen Surg consulted for GB removal  Discussed diagnosis at length with patient and her mother

## 2019-03-21 NOTE — [employer] PATIENT INSTRUCTIONS
Discharge Summary/Instructions after an Endoscopic Procedure  Patient Name: Dea Ravi  Patient MRN: [id number]  Patient YOB: 1965  Thursday, March 21, 2019  Layla Carrillo MD  RESTRICTIONS:  During your procedure today, you received medications for sedation.  These   medications may affect your judgment, balance and coordination.  Therefore,   for 24 hours, you have the following restrictions:   - DO NOT drive a car, operate machinery, make legal/financial decisions,   sign important papers or drink alcohol.    ACTIVITY:  Today: no heavy lifting, straining or running due to procedural   sedation/anesthesia.  The following day: return to full activity including work.  DIET:  Eat and drink normally unless instructed otherwise.     TREATMENT FOR COMMON SIDE EFFECTS:  - Mild abdominal pain, nausea, belching, bloating or excessive gas:  rest,   eat lightly and use a heating pad.  - Sore Throat: treat with throat lozenges and/or gargle with warm salt   water.  - Because air was used during the procedure, expelling large amounts of air   from your rectum or belching is normal.  - If a bowel prep was taken, you may not have a bowel movement for 1-3 days.    This is normal.  SYMPTOMS TO WATCH FOR AND REPORT TO YOUR PHYSICIAN:  1. Abdominal pain or bloating, other than gas cramps.  2. Chest pain.  3. Back pain.  4. Signs of infection such as: chills or fever occurring within 24 hours   after the procedure.  5. Rectal bleeding, which would show as bright red, maroon, or black stools.   (A tablespoon of blood from the rectum is not serious, especially if   hemorrhoids are present.)  6. Vomiting.  7. Weakness or dizziness.  GO DIRECTLY TO THE NEAREST EMERGENCY ROOM IF YOU HAVE ANY OF THE FOLLOWING:      Difficulty breathing              Chills and/or fever over 101 F   Persistent vomiting and/or vomiting blood   Severe abdominal pain   Severe chest pain   Black, tarry stools   Bleeding- more than one  tablespoon   Any other symptom or condition that you feel may need urgent attention  Your doctor recommends these additional instructions:  If any biopsies were taken, your doctors clinic will contact you in 1 to 2   weeks with any results.  - Return patient to hospital laws for ongoing care.   - Resume previous diet today.   - Use Protonix (pantoprazole) 40 mg PO daily for 2 months (for antral   ulcers).   - Continue present medications.   - Cipro (ciprofloxacin) 400 mg IV q 12 hr for 7 days for treatment of   cholangitis.   - Observe patient's clinical course.   - Surgical consultation for consideration of cholecystectomy.   - Watch for pancreatitis, bleeding, perforation, and cholangitis.  For questions, problems or results please call your physician - Layla Carrillo MD at Work:  [phone].  [facility] can be reached at [phone]     IF A COMPLICATION OR EMERGENCY SITUATION ARISES AND YOU ARE UNABLE TO REACH   YOUR PHYSICIAN - GO DIRECTLY TO THE EMERGENCY ROOM.  Layla Carrillo MD  3/21/2019 4:23:04 PM  This report has been verified and signed electronically.  [employer]

## 2019-03-21 NOTE — ED PROVIDER NOTES
Encounter Date: 3/21/2019       History     Chief Complaint   Patient presents with    Abdominal Pain     pt complains had sudden onset of abd cramping that has turned into constant pain. vomited once . states pain goes through to back     This is an emergent evaluation of a 53-year-old female who presents via personal transportation () with acute severe abdominal pain.  The patient reports she was in her usual state of health today.  She ate Chocolate Pedro Charms and milk for supper around 9:00 p.m..  Approximately 15 minutes later, patient developed acute severe spasm-type upper abdominal pain.  This persisted for hours.  Patient was only comfortable when she was half sitting up and not lying on her right side.  She eventually fell asleep around midnight, but woke up about 10 minutes later when her pain became constant and began radiating to her back.  Patient vomited twice, both times gastric contents.  Finally she came to emergency department.  Patient took motrin and a muscle relaxer this evening without relief.  Last BM was yesterday Tuesday 3/20/19 and was harder than usual.  No urinary complaints.  No fevers or sweats.      Patient reports that over the last several months, she has had similar pain to tonight's after eating cereal with milk.  However it has never lasted this long or progressed from spasmodic to severe.      PMH: HTN, compliant with valsartan 160mg PO BID  PSH: none          Review of patient's allergies indicates:  No Known Allergies  Past Medical History:   Diagnosis Date    High cholesterol     Hypertension     Thyroid disease      Past Surgical History:   Procedure Laterality Date    WISDOM TOOTH EXTRACTION       History reviewed. No pertinent family history.  Social History     Tobacco Use    Smoking status: Never Smoker    Smokeless tobacco: Never Used   Substance Use Topics    Alcohol use: No     Frequency: Never    Drug use: No     Review of Systems   Constitutional:  Negative for diaphoresis.   HENT: Negative for trouble swallowing.    Eyes: Negative for visual disturbance.   Respiratory: Negative for cough.    Cardiovascular: Negative for chest pain.   Gastrointestinal: Positive for abdominal pain, nausea and vomiting.   Genitourinary: Negative for dysuria.   Musculoskeletal: Negative for neck pain.   Skin: Negative for rash.   Neurological: Negative for light-headedness.       Physical Exam     Initial Vitals [03/21/19 0151]   BP Pulse Resp Temp SpO2   (!) 144/82 80 16 97.9 °F (36.6 °C) 100 %      MAP       --         Physical Exam    Nursing note and vitals reviewed.  Constitutional: She appears well-developed and well-nourished. She is not diaphoretic.   Awake and alert uncomfortable middle-aged female. Obese.   HENT:   Head: Normocephalic and atraumatic.   Mouth/Throat: Oropharynx is clear and moist.   Eyes: Conjunctivae and EOM are normal. Pupils are equal, round, and reactive to light.   Neck: Normal range of motion. Neck supple.   Cardiovascular: Normal rate, regular rhythm, normal heart sounds and intact distal pulses.   Pulmonary/Chest: Breath sounds normal. No respiratory distress. She has no wheezes. She has no rhonchi. She has no rales.   Abdominal: Soft. She exhibits no distension. There is tenderness. There is no rebound and no guarding.   RUQ, epigastric, L mid abdom TTP. No rebound/guarding.    Musculoskeletal: Normal range of motion. She exhibits no edema or tenderness.   Neurological: She is alert and oriented to person, place, and time. She has normal strength.   Moving all extremities.   Skin: Skin is warm and dry. No erythema. No pallor.   Psychiatric: She has a normal mood and affect.         ED Course   ED US Abdomen Right Upper Quadrant  Date/Time: 3/21/2019 2:00 AM  Performed by: Sonal Alvarez MD  Authorized by: Sonal Alvarez MD   Comments: RUQ sono: +gallstones. +trace free fluid in Clifford's pouch.         Labs Reviewed   COMPREHENSIVE  METABOLIC PANEL - Abnormal; Notable for the following components:       Result Value    Alkaline Phosphatase 180 (*)      (*)      (*)     All other components within normal limits   CBC W/ AUTO DIFFERENTIAL - Abnormal; Notable for the following components:    Platelets 360 (*)     All other components within normal limits   LIPASE - Abnormal; Notable for the following components:    Lipase >1000 (*)     All other components within normal limits   URINALYSIS, REFLEX TO URINE CULTURE - Abnormal; Notable for the following components:    Specific Gravity, UA >1.030 (*)     Urobilinogen, UA 2.0-3.0 (*)     All other components within normal limits    Narrative:     Preferred Collection Type->Urine, Clean Catch   TROPONIN I   B-TYPE NATRIURETIC PEPTIDE     EKG Readings: (Independently Interpreted)   02:30: NSR, HR 74. Normal axis. TWI in III. No STEMI.       Imaging Results          CT Abdomen Pelvis With Contrast (Final result)     Abnormal  Result time 03/21/19 03:48:09    Final result by Mac Pike MD (03/21/19 03:48:09)                 Impression:      Extensive peripancreatic inflammatory changes in keeping with acute pancreatitis.  No evidence of necrosis or vascular complication.    Cholelithiasis with intra and extrahepatic biliary ductal dilation, raising the question of common bile duct stone or sludge.  Recommend correlation with serum bilirubin and clinical findings.  GI evaluation advised, with consideration for MRCP versus ERCP as clinically appropriate.    Additional findings discussed in the body of the report.    This report was flagged in Epic as abnormal.      Electronically signed by: Mac Pike MD  Date:    03/21/2019  Time:    03:48             Narrative:    EXAMINATION:  CT ABDOMEN PELVIS WITH CONTRAST    CLINICAL HISTORY:  RUQ and periumbilical pain, nausea, vomiting;    TECHNIQUE:  Low dose axial images, sagittal and coronal reformations were obtained from the lung  bases to the pubic symphysis following the IV administration of 80 mL of Omnipaque 350 .  Oral contrast was not given.    COMPARISON:  None.    FINDINGS:  Lower Chest:    Lung bases are clear.  Heart size is normal.    Abdomen:    Liver is normal in size and contour.  No suspicious hepatic lesion.  Gallbladder is relatively contracted and appears to demonstrate small gallstones in the region of the gallbladder neck.  The common bile duct is dilated up to 1.1 cm in diameter.  There is mild intrahepatic biliary ductal dilatation.  Subtle irregularity involving the distal common bile duct raises the question of obstructing stone or sludge.  Other obstructive etiologies are also included in the differential mass lesion, noting that the pancreatic duct is not dilated.    There is extensive peripancreatic inflammatory changes and edema with small volume unorganized fluid in the anterior pararenal space.  No organized peripancreatic collection.  No evidence of pancreatic necrosis.  No vascular complication identified.    Spleen is normal in size.  Adrenal glands are unremarkable.    The kidneys are symmetric.  No hydronephrosis.    No small bowel obstruction.    No pneumoperitoneum or organized fluid collection.    No bulky lymphadenopathy.    Abdominal aorta is normal in caliber with mild calcific atherosclerosis.    Portal, splenic, and superior mesenteric veins are patent.    Pelvis:    Urinary bladder, pelvic organs, and rectum are negative for acute findings.  Small cystic lesion noted near the cervix likely represents a nabothian cyst.  Probable small fundal uterine fibroids.  Small volume free fluid in the pelvis.  No pelvic lymphadenopathy.    Bones and soft tissues:    No aggressive osseous lesions.                                 Medical Decision Making:   History:   Old Medical Records: I decided to obtain old medical records.  Old Records Summarized: records from another hospital and records from clinic  "visits.  Initial Assessment:   53-year-old female with acute severe upper abdominal pain and nausea and vomiting.  Differential Diagnosis:   Ddx includes ACS, dissection, PE, CHF, GERD, PUD, pancreatitis, biliary colic, choledocholithiasis, cholangitis, cholecystitis, other.  Clinical Tests:   Lab Tests: Ordered and Reviewed  Radiological Study: Reviewed and Ordered  ED Management:  CBC reassuring.    CMP: Alk phos 180, , . Lipase > 1000. UA within normal.    CT abdom pelvis:   "Extensive peripancreatic inflammatory changes in keeping with acute pancreatitis.  No evidence of necrosis or vascular complication. Cholelithiasis with intra and extrahepatic biliary ductal dilation, raising the question of common bile duct stone or sludge."    Patient requires admit for gallstone pancreatitis, GI eval, gen surg eval, NPO, IV fluids, pain control.     Patient has had 2 rounds of morphine and 1 of dilaudid in ED in addition to fluids and antiemetics. She is more comfortable and BP has improved.     I discussed diagnosis with patient/ and need for admission. I have written courtesy orders.  Other:   I have discussed this case with another health care provider.                      Clinical Impression:       ICD-10-CM ICD-9-CM   1. Vomiting R11.10 787.03   2. Abdominal pain, unspecified abdominal location R10.9 789.00   3. Acute pancreatitis, unspecified complication status, unspecified pancreatitis type K85.90 577.0   4. Acute gallstone pancreatitis K85.10 577.0     574.20                           Sonal Alvarez MD  03/21/19 0429    "

## 2019-03-21 NOTE — CONSULTS
[facility]  General Surgery  Consult Note    Patient Name: Dea Ravi  MRN: [id number]  Code Status: Full Code  Admission Date: 3/21/2019  Hospital Length of Stay: 0 days  Attending Physician: Erin Riddle MD  Primary Care Provider: Primary Doctor No    Patient information was obtained from patient and ER records.     Consults  Subjective:     Principal Problem: Acute gallstone pancreatitis    History of Present Illness: 54 yo F with 1 day hx of epigastric pain, and across her abdomen. She has previous episodes of epigastric discomfort in the past. Denies any previous episodes of pancreatitis. She denies acholic stools or hx of jaundice.     No current facility-administered medications on file prior to encounter.      Current Outpatient Medications on File Prior to Encounter   Medication Sig    albuterol 90 mcg/actuation inhaler Inhale 2 puffs into the lungs every 6 (six) hours as needed for Wheezing. Rescue    valsartan (DIOVAN) 80 MG tablet Take 160 mg by mouth 2 (two) times daily.        Review of patient's allergies indicates:  No Known Allergies    Past Medical History:   Diagnosis Date    High cholesterol     Hypertension     Thyroid disease      Past Surgical History:   Procedure Laterality Date    WISDOM TOOTH EXTRACTION       Family History     Problem Relation (Age of Onset)    Cancer Mother    Diverticulitis Mother, Father        Tobacco Use    Smoking status: Never Smoker    Smokeless tobacco: Never Used   Substance and Sexual Activity    Alcohol use: No     Frequency: Never    Drug use: No    Sexual activity: Not on file     Review of Systems   Constitutional: Negative for chills and fever.   HENT: Negative for sore throat and trouble swallowing.    Eyes: Negative for redness and visual disturbance.   Respiratory: Negative for cough and chest tightness.    Cardiovascular: Negative for chest pain and palpitations.   Gastrointestinal: Negative for abdominal pain,  constipation, diarrhea and nausea.   Endocrine: Negative for cold intolerance and heat intolerance.   Genitourinary: Negative for difficulty urinating and dysuria.   Musculoskeletal: Negative for back pain and joint swelling.   Skin: Negative for rash and wound.   Allergic/Immunologic: Negative for food allergies and immunocompromised state.   Neurological: Negative for dizziness and headaches.   Hematological: Negative for adenopathy. Does not bruise/bleed easily.   Psychiatric/Behavioral: Negative for agitation and behavioral problems.     Objective:     Vital Signs (Most Recent):  Temp: 97.5 °F (36.4 °C) (03/21/19 0529)  Pulse: 73 (03/21/19 0529)  Resp: 18 (03/21/19 0529)  BP: 129/75 (03/21/19 0529)  SpO2: 97 % (03/21/19 0712) Vital Signs (24h Range):  Temp:  [97.5 °F (36.4 °C)-97.9 °F (36.6 °C)] 97.5 °F (36.4 °C)  Pulse:  [71-81] 73  Resp:  [15-38] 18  SpO2:  [97 %-100 %] 97 %  BP: (116-166)/(69-83) 129/75     Weight: 90.7 kg (199 lb 15.3 oz)  Body mass index is 37.78 kg/m².    Physical Exam   Constitutional: No distress.   HENT:   Head: Normocephalic and atraumatic.   Eyes: No scleral icterus.   Neck: No tracheal deviation present.   Cardiovascular: Intact distal pulses.   Pulmonary/Chest: Effort normal.   Abdominal: Soft.   Tender to palpation epigastrium. Negative cuellar's   Musculoskeletal: She exhibits no deformity.   Neurological: She is alert.   Psychiatric: She has a normal mood and affect.       Significant Labs:  CBC:   Recent Labs   Lab 03/21/19  0206   WBC 8.03   RBC 5.14   HGB 14.4   HCT 44.4   *   MCV 86   MCH 28.0   MCHC 32.4     Lipase >1000    Significant Diagnostics:  CT: I have reviewed all pertinent results/findings within the past 24 hours and my personal findings are:  Pancreatitis, dilated intra and extrahepatic ducts      MRCP: Choledocholithiasis with dilatation of the intrahepatic and extrahepatic biliary tree.    Enlarged pancreas with surrounding inflammatory stranding in  keeping with pancreatitis.    Numerous small stones in a contracted gallbladder.  Assessment/Plan:     * Acute gallstone pancreatitis    NPO, IVF  MRCP with evidence of Choledocholithiasis  Await GI evaluation, will need clearance of duct.  Lap armando once pancreatitis resolves, prior to discharge.       VTE Risk Mitigation (From admission, onward)        Ordered     IP VTE HIGH RISK PATIENT  Once      03/21/19 0521     Reason for No Pharmacological VTE Prophylaxis  Once      03/21/19 0521          Thank you for your consult. I will follow-up with patient. Please contact us if you have any additional questions.    Papito Mcnamara MD  General Surgery  [facility]

## 2019-03-21 NOTE — HOSPITAL COURSE
Admitted with biliary pancreatitis. Started on IVF and pain control. GI consulted. Patient went for ERCP on 3/21. ERCP showed clean based ulcers/erosions in stomach antrum, dilation of bile duct and common hepatic duct due to obstructing stone, choledocholithiasis, and pus consistent with cholangitis. Stones were removed. She was started on cipro IV BID for cholangitis and pantoprazole BID for ulcers. Surgery consulted for cholecystectomy.  T bili francisco after ERCP as expected from duct swelling; now decreasing.  Patient tolerating a diet. Pain is resolved. Went for lap cholecystectomy on 3/25-- severe chronic cholecystitis; added flagyl to cipro. Will plan to continue both cipro and flagyl for 7 days total. Will continue pantoprazole 40mg BID for gastric ulcers for 2 months per GI recommendations. Surgery removed drain. Discharged to home.

## 2019-03-21 NOTE — HPI
Ms Dea Ravi is a 53 y.o. woman with HTN and obesity who presents for abdominal pain. She developed 9.5/10 crampy then sharp abdominal pain located across the upper part of her abdomen with no radiation after eating cereal last night. Nothing makes it better (other than pain meds) or worse. Associated with nausea and vomiting. No hematemesis. No fevers or chills. No change in BMs or urination. She has had intermittent crampy pain after eating cereal, corn, nuts (not fatty foods) for months. She has been told that she had gallbladder problems in the past. No alcohol. Takes valsartan only at home; no statin.     In ED, found to have biliary pancreatitis. Given IVF and pain control.

## 2019-03-22 LAB
ALBUMIN SERPL BCP-MCNC: 3.5 G/DL
ALP SERPL-CCNC: 257 U/L
ALT SERPL W/O P-5'-P-CCNC: 484 U/L
ANION GAP SERPL CALC-SCNC: 10 MMOL/L
AST SERPL-CCNC: 372 U/L
BILIRUB SERPL-MCNC: 5.6 MG/DL
BUN SERPL-MCNC: 6 MG/DL
CALCIUM SERPL-MCNC: 9.1 MG/DL
CHLORIDE SERPL-SCNC: 105 MMOL/L
CO2 SERPL-SCNC: 22 MMOL/L
CREAT SERPL-MCNC: 0.7 MG/DL
EST. GFR  (AFRICAN AMERICAN): >60 ML/MIN/1.73 M^2
EST. GFR  (NON AFRICAN AMERICAN): >60 ML/MIN/1.73 M^2
GLUCOSE SERPL-MCNC: 76 MG/DL
LIPASE SERPL-CCNC: >1000 U/L
POTASSIUM SERPL-SCNC: 3.5 MMOL/L
PROT SERPL-MCNC: 6.9 G/DL
SODIUM SERPL-SCNC: 137 MMOL/L

## 2019-03-22 PROCEDURE — 83690 ASSAY OF LIPASE: CPT

## 2019-03-22 PROCEDURE — 63600175 PHARM REV CODE 636 W HCPCS: Performed by: EMERGENCY MEDICINE

## 2019-03-22 PROCEDURE — 11000001 HC ACUTE MED/SURG PRIVATE ROOM

## 2019-03-22 PROCEDURE — 25000003 PHARM REV CODE 250: Performed by: HOSPITALIST

## 2019-03-22 PROCEDURE — 63600175 PHARM REV CODE 636 W HCPCS: Performed by: HOSPITALIST

## 2019-03-22 PROCEDURE — 36415 COLL VENOUS BLD VENIPUNCTURE: CPT

## 2019-03-22 PROCEDURE — 80053 COMPREHEN METABOLIC PANEL: CPT

## 2019-03-22 PROCEDURE — 25000003 PHARM REV CODE 250: Performed by: EMERGENCY MEDICINE

## 2019-03-22 RX ORDER — SIMETHICONE 80 MG
1 TABLET,CHEWABLE ORAL 3 TIMES DAILY PRN
Status: DISCONTINUED | OUTPATIENT
Start: 2019-03-22 | End: 2019-03-27 | Stop reason: HOSPADM

## 2019-03-22 RX ORDER — AMOXICILLIN 250 MG
2 CAPSULE ORAL 2 TIMES DAILY
Status: DISCONTINUED | OUTPATIENT
Start: 2019-03-22 | End: 2019-03-25

## 2019-03-22 RX ADMIN — GUAIFENESIN AND DEXTROMETHORPHAN HYDROBROMIDE 1 TABLET: 600; 30 TABLET, EXTENDED RELEASE ORAL at 09:03

## 2019-03-22 RX ADMIN — VALSARTAN 160 MG: 80 TABLET, FILM COATED ORAL at 08:03

## 2019-03-22 RX ADMIN — SODIUM CHLORIDE: 0.9 INJECTION, SOLUTION INTRAVENOUS at 12:03

## 2019-03-22 RX ADMIN — GUAIFENESIN AND DEXTROMETHORPHAN HYDROBROMIDE 1 TABLET: 600; 30 TABLET, EXTENDED RELEASE ORAL at 08:03

## 2019-03-22 RX ADMIN — PANTOPRAZOLE SODIUM 40 MG: 40 TABLET, DELAYED RELEASE ORAL at 08:03

## 2019-03-22 RX ADMIN — HYDROMORPHONE HYDROCHLORIDE 2 MG: 2 INJECTION, SOLUTION INTRAMUSCULAR; INTRAVENOUS; SUBCUTANEOUS at 07:03

## 2019-03-22 RX ADMIN — CIPROFLOXACIN 400 MG: 2 INJECTION, SOLUTION INTRAVENOUS at 05:03

## 2019-03-22 RX ADMIN — DOCUSATE SODIUM AND SENNOSIDES 2 TABLET: 8.6; 5 TABLET, FILM COATED ORAL at 09:03

## 2019-03-22 RX ADMIN — DIPHENHYDRAMINE HYDROCHLORIDE 25 MG: 50 INJECTION, SOLUTION INTRAMUSCULAR; INTRAVENOUS at 07:03

## 2019-03-22 RX ADMIN — DOCUSATE SODIUM AND SENNOSIDES 2 TABLET: 8.6; 5 TABLET, FILM COATED ORAL at 08:03

## 2019-03-22 RX ADMIN — HYDROMORPHONE HYDROCHLORIDE 2 MG: 2 INJECTION, SOLUTION INTRAMUSCULAR; INTRAVENOUS; SUBCUTANEOUS at 11:03

## 2019-03-22 RX ADMIN — POLYETHYLENE GLYCOL 3350 17 G: 17 POWDER, FOR SOLUTION ORAL at 09:03

## 2019-03-22 RX ADMIN — CIPROFLOXACIN 400 MG: 2 INJECTION, SOLUTION INTRAVENOUS at 04:03

## 2019-03-22 RX ADMIN — ACETAMINOPHEN 650 MG: 325 TABLET, FILM COATED ORAL at 04:03

## 2019-03-22 RX ADMIN — HYDROMORPHONE HYDROCHLORIDE 2 MG: 2 INJECTION, SOLUTION INTRAMUSCULAR; INTRAVENOUS; SUBCUTANEOUS at 01:03

## 2019-03-22 RX ADMIN — DIPHENHYDRAMINE HYDROCHLORIDE 25 MG: 50 INJECTION, SOLUTION INTRAMUSCULAR; INTRAVENOUS at 01:03

## 2019-03-22 RX ADMIN — PANTOPRAZOLE SODIUM 40 MG: 40 TABLET, DELAYED RELEASE ORAL at 09:03

## 2019-03-22 RX ADMIN — DIPHENHYDRAMINE HYDROCHLORIDE 25 MG: 50 INJECTION, SOLUTION INTRAMUSCULAR; INTRAVENOUS at 08:03

## 2019-03-22 RX ADMIN — HYDROMORPHONE HYDROCHLORIDE 2 MG: 2 INJECTION, SOLUTION INTRAMUSCULAR; INTRAVENOUS; SUBCUTANEOUS at 08:03

## 2019-03-22 RX ADMIN — DIPHENHYDRAMINE HYDROCHLORIDE 25 MG: 50 INJECTION, SOLUTION INTRAMUSCULAR; INTRAVENOUS at 11:03

## 2019-03-22 NOTE — SUBJECTIVE & OBJECTIVE
"Interval History: Pain changed this AM- says she feels like she did "1000 sit ups." Nausea controlled. No vomiting. No gas or BMs. Complains of chest congestion and inability to take a deep breath. Wants to try liquids    Review of Systems   Constitutional: Positive for appetite change. Negative for chills and fever.   HENT: Positive for congestion.    Respiratory: Positive for shortness of breath. Negative for cough and chest tightness.    Cardiovascular: Negative for chest pain.   Gastrointestinal: Positive for abdominal distention, abdominal pain, constipation and nausea. Negative for diarrhea and vomiting.   Genitourinary: Negative for difficulty urinating.     Objective:     Vital Signs (Most Recent):  Temp: 98.4 °F (36.9 °C) (03/22/19 0734)  Pulse: 91 (03/22/19 0734)  Resp: 18 (03/22/19 0734)  BP: 109/72 (03/22/19 0734)  SpO2: 95 % (03/22/19 0734) Vital Signs (24h Range):  Temp:  [97.7 °F (36.5 °C)-98.8 °F (37.1 °C)] 98.4 °F (36.9 °C)  Pulse:  [] 91  Resp:  [16-20] 18  SpO2:  [93 %-99 %] 95 %  BP: (107-161)/(63-95) 109/72     Weight: 90.7 kg (199 lb 15.3 oz)  Body mass index is 37.78 kg/m².  No intake or output data in the 24 hours ending 03/22/19 0806   Physical Exam   Constitutional: She appears well-developed and well-nourished. No distress.   obese   HENT:   Head: Normocephalic and atraumatic.   Nose: Nose normal.   Mouth/Throat: Oropharynx is clear and moist.   Cardiovascular: Normal rate, regular rhythm and normal heart sounds. Exam reveals no gallop and no friction rub.   No murmur heard.  Pulmonary/Chest: Breath sounds normal. No stridor. No respiratory distress. She has no wheezes. She has no rales.   Not taking full breaths. On room air   Abdominal: Bowel sounds are normal. She exhibits distension (epigastrium). She exhibits no mass. There is tenderness (RUQ, epigastric, LUQ). There is no rebound and no guarding.   Musculoskeletal: She exhibits no edema.   Neurological: She is alert.   Skin: " Skin is warm and dry. No rash noted. She is not diaphoretic. No erythema.   Nursing note and vitals reviewed.      Significant Labs: All pertinent labs within the past 24 hours have been reviewed.    Significant Imaging: I have reviewed and interpreted all pertinent imaging results/findings within the past 24 hours.

## 2019-03-22 NOTE — PLAN OF CARE
Problem: Adult Inpatient Plan of Care  Goal: Plan of Care Review  Outcome: Ongoing (interventions implemented as appropriate)   03/22/19 1800   Plan of Care Review   Plan of Care Reviewed With patient   Patient is awake alert oriented, answering appropriately. Tolerating po intake, denies N/V pt is passing gas and burping. IV fluids and IV antibiotics as ordered. Back pain managed with Narcotic and acetaminophen as ordered. Ambulating to BR. Hourly rounds, call light in reach, instructed to call for assistance if needed, free of falls. Continue with plan of care as ordered. No distress noted

## 2019-03-22 NOTE — PROGRESS NOTES
Surgery Progress Note    Primary Problem: Acute gallstone pancreatitis    Other problems:   Active Hospital Problems    Diagnosis  POA    *Acute gallstone pancreatitis [K85.10]  Yes    Essential hypertension [I10]  Yes    Hyperlipidemia [E78.5]  Yes    Class 2 obesity with body mass index (BMI) of 37.0 to 37.9 in adult [E66.9, Z68.37]  Not Applicable    Vomiting [R11.10]  Yes    Ascending cholangitis [K83.09]  Yes    Acute gastric ulcer without hemorrhage or perforation [K25.3]  Yes      Resolved Hospital Problems   No resolved problems to display.       HD #  LOS: 1 day   POD #1 Day Post-Op s/p ERCP sphincterotomy, stone extraction    Overnight events: Feels a little better. Still with epigastric pain. No nausea or emesis     ROS:  No chest pain  No shortness of breath    Diet - Diet NPO     I/O last 3 completed shifts:  In: 1000 [IV Piggyback:1000]  Out: -     Intake/Output Summary (Last 24 hours) at 3/22/2019 0607  Last data filed at 3/21/2019 0800  Gross per 24 hour   Intake 0 ml   Output --   Net 0 ml       Temp:  [97.7 °F (36.5 °C)-98.8 °F (37.1 °C)] 98 °F (36.7 °C)  Pulse:  [] 88  Resp:  [16-20] 18  SpO2:  [93 %-99 %] 94 %  BP: (107-161)/(63-95) 132/81    Gen: alert, well appearing, and in no distress,    Abdomen soft tender to palpation epigastrium, no rebound    Recent Labs   Lab 03/21/19  0206   WBC 8.03   HGB 14.4   HCT 44.4   *      K 3.6      BUN 10   GLU 98   PROT 7.8   ALBUMIN 4.1   BILITOT 0.9   *   ALKPHOS 180*   *        Assessment: s/p ERCP, sphincterotomy for choledocholithiasis, biliary pancreatitis    Plan: F/u am labs  OK with clears  Lap Olena when pancreatitis resolves    Papito Mcnamara MD

## 2019-03-22 NOTE — PLAN OF CARE
Problem: Adult Inpatient Plan of Care  Goal: Plan of Care Review  Pt remains free from falls,moustapha pain well,able to move all extremities well,had ERCP done this shift,stone removed,iv abx Cipro in progress,no s/s adverse reaction  Noted,ambulated to and from bathroom with standby assist,pain management effective,iv site clear,dressing clean,dry,intact,continue monitoring.

## 2019-03-22 NOTE — PROGRESS NOTES
[facility]  Gastroenterology  Progress Note    Patient Name: Dea Ravi  MRN: [id number]  Admission Date: 3/21/2019  Hospital Length of Stay: 1 days  Code Status: Full Code   Attending Provider: Erin Riddle MD  Primary Care Physician: Primary Doctor No  Principal Problem: Acute gallstone pancreatitis    Subjective:     CC= Abdominal pain, biliary pancreatitis     Interval History: Patient continues to report abdominal pain, though improved from yesterday.  Tolerating small amount of clear liquids.  No vomiting.      Review of systems:  General: Negative for fevers, chills.  Cardiovascular:  Negative for chest pain, shortness of breath     Objective:     Vital Signs (Most Recent):  Temp: 98.3 °F (36.8 °C) (03/22/19 1113)  Pulse: 97 (03/22/19 1113)  Resp: 18 (03/22/19 1113)  BP: 136/79 (03/22/19 1113)  SpO2: 95 % (03/22/19 1113) Vital Signs (24h Range):  Temp:  [97.7 °F (36.5 °C)-98.8 °F (37.1 °C)] 98.3 °F (36.8 °C)  Pulse:  [] 97  Resp:  [16-20] 18  SpO2:  [93 %-98 %] 95 %  BP: (109-161)/(72-95) 136/79     Physical examination:  GEN: Overweight WF in no apparent distress   HENT: Normocephalic, mildly icteric sclera   Cardiovascular: Regular rate and rhythm. No murmurs appreciated.   Chest: Non-labored respirations. Breath sounds equal   Abdomen: Soft, mild upper abdomen TTP, ND, + BS  Psych: Appropriate mood and affect.   Extermities: No C/C/E. 2+ dorsalis pedis pulses bilaterally    Recent Labs   Lab 03/21/19  0206   WBC 8.03   HGB 14.4   HCT 44.4   *     CMP:   Recent Labs   Lab 03/22/19  0535   GLU 76   CALCIUM 9.1   ALBUMIN 3.5   PROT 6.9      K 3.5   CO2 22*      BUN 6   CREATININE 0.7   ALKPHOS 257*   *   *   BILITOT 5.6*       Imaging:  CT abdomen/pelvis with contrast (3/21/19):  Impression:  Extensive peripancreatic inflammatory changes in keeping with acute pancreatitis.  No evidence of necrosis or vascular complication.  Cholelithiasis with  intra and extrahepatic biliary ductal dilation, raising the question of common bile duct stone or sludge.  Recommend correlation with serum bilirubin and clinical findings.  GI evaluation advised, with consideration for MRCP versus ERCP as clinically appropriate.  Additional findings discussed in the body of the report.      Assessment:   53 year old female with a history of HTN, HLD and hypothyroidism presenting with biliary pancreatitis.  S/P ERCP with stone extraction on 3/21.  Suspect rise in LFTs today due to edema.  Clinically improving.      Plan:   1.  Repeat LFT's in am.  2.  Cholecystectomy at surgeon's discretion.  3.  Continue with clear liquid diet for now.  4.  Will follow.       Brittany Corbin PA-C  Gastroenterology  [facility]

## 2019-03-22 NOTE — ASSESSMENT & PLAN NOTE
Has characteristic pain, lipase >1000, and changes consistent with pancreatitis of biliary origin on CT  No alcohol use  Lipids elevated but no hypertriglyceridemia ()  Started on IVF and pain control- continue  GI consulted for ERCP on 3/21- dilation of ducts, stones removed, cholangitis present. Started cipro on 3/21 for cholangitis  Gen Surg consulted for GB removal- will be done when pancreatitis resolves  LFTs rising, lipase still >1000 on 3/22  Now with no gas or BMs but still has bowel sounds- monitor for ileus  Also with congestion and shortness of breath but normal pulmonary exam and stable on room air- monitor for edema  Patient wants to try diet- start clear liquids

## 2019-03-22 NOTE — NURSING
Pt. AAOx4. Upper abd. pain 6/10. PRN pain meds given. Pt. Ambulated to the restroom x 1 assist. Communication board updated. Call light in reach. Will continue to monitor.

## 2019-03-22 NOTE — ASSESSMENT & PLAN NOTE
Seen on ERCP 3/21  No signs of sepsis  Started cipro IV on 3/21- continue x7 days (last day 3/27)

## 2019-03-22 NOTE — PROGRESS NOTES
"[facility] Medicine  Progress Note    Patient Name: Dea Ravi  MRN: [id number]  Patient Class: IP- Inpatient   Admission Date: 3/21/2019  Length of Stay: 1 days  Attending Physician: Erin Riddle MD  Primary Care Provider: Primary Doctor No        Subjective:     Principal Problem:Acute gallstone pancreatitis    HPI:  Ms Dea Ravi is a 53 y.o. woman with HTN and obesity who presents for abdominal pain. She developed 9.5/10 crampy then sharp abdominal pain located across the upper part of her abdomen with no radiation after eating cereal last night. Nothing makes it better (other than pain meds) or worse. Associated with nausea and vomiting. No hematemesis. No fevers or chills. No change in BMs or urination. She has had intermittent crampy pain after eating cereal, corn, nuts (not fatty foods) for months. She has been told that she had gallbladder problems in the past. No alcohol. Takes valsartan only at home; no statin.     In ED, found to have biliary pancreatitis. Given IVF and pain control.     Hospital Course:  Admitted with biliary pancreatitis. Started on IVF and pain control. GI consulted. Patient went for ERCP on 3/21. ERCP showed clean based ulcers/erosions in stomach antrum, dilation of bile duct and common hepatic duct due to obstructing stone, choledocholithiasis, and pus consistent with cholangitis. Stones were removed. She was started on cipro IV BID for cholangitis and pantoprazole BID for ulcers. Surgery consulted for cholecystectomy.     Interval History: Pain changed this AM- says she feels like she did "1000 sit ups." Nausea controlled. No vomiting. No gas or BMs. Complains of chest congestion and inability to take a deep breath. Wants to try liquids    Review of Systems   Constitutional: Positive for appetite change. Negative for chills and fever.   HENT: Positive for congestion.    Respiratory: Positive for shortness of breath. Negative for cough and " chest tightness.    Cardiovascular: Negative for chest pain.   Gastrointestinal: Positive for abdominal distention, abdominal pain, constipation and nausea. Negative for diarrhea and vomiting.   Genitourinary: Negative for difficulty urinating.     Objective:     Vital Signs (Most Recent):  Temp: 98.4 °F (36.9 °C) (03/22/19 0734)  Pulse: 91 (03/22/19 0734)  Resp: 18 (03/22/19 0734)  BP: 109/72 (03/22/19 0734)  SpO2: 95 % (03/22/19 0734) Vital Signs (24h Range):  Temp:  [97.7 °F (36.5 °C)-98.8 °F (37.1 °C)] 98.4 °F (36.9 °C)  Pulse:  [] 91  Resp:  [16-20] 18  SpO2:  [93 %-99 %] 95 %  BP: (107-161)/(63-95) 109/72     Weight: 90.7 kg (199 lb 15.3 oz)  Body mass index is 37.78 kg/m².  No intake or output data in the 24 hours ending 03/22/19 0806   Physical Exam   Constitutional: She appears well-developed and well-nourished. No distress.   obese   HENT:   Head: Normocephalic and atraumatic.   Nose: Nose normal.   Mouth/Throat: Oropharynx is clear and moist.   Cardiovascular: Normal rate, regular rhythm and normal heart sounds. Exam reveals no gallop and no friction rub.   No murmur heard.  Pulmonary/Chest: Breath sounds normal. No stridor. No respiratory distress. She has no wheezes. She has no rales.   Not taking full breaths. On room air   Abdominal: Bowel sounds are normal. She exhibits distension (epigastrium). She exhibits no mass. There is tenderness (RUQ, epigastric, LUQ). There is no rebound and no guarding.   Musculoskeletal: She exhibits no edema.   Neurological: She is alert.   Skin: Skin is warm and dry. No rash noted. She is not diaphoretic. No erythema.   Nursing note and vitals reviewed.      Significant Labs: All pertinent labs within the past 24 hours have been reviewed.    Significant Imaging: I have reviewed and interpreted all pertinent imaging results/findings within the past 24 hours.    Assessment/Plan:      * Acute gallstone pancreatitis    Has characteristic pain, lipase >1000, and changes  consistent with pancreatitis of biliary origin on CT  No alcohol use  Lipids elevated but no hypertriglyceridemia ()  Started on IVF and pain control- continue  GI consulted for ERCP on 3/21- dilation of ducts, stones removed, cholangitis present. Started cipro on 3/21 for cholangitis  Gen Surg consulted for GB removal- will be done when pancreatitis resolves  LFTs rising, lipase still >1000 on 3/22  Now with no gas or BMs but still has bowel sounds- monitor for ileus  Also with congestion and shortness of breath but normal pulmonary exam and stable on room air- monitor for edema  Patient wants to try diet- start clear liquids         Acute gastric ulcer without hemorrhage or perforation    As seen during ERCP- gastric antral ulcers  PPI BID started- needed until 5/22/2019       Ascending cholangitis    Seen on ERCP 3/21  No signs of sepsis  Started cipro IV on 3/21- continue x7 days (last day 3/27)       Vomiting    Due to gallstone pancreatitis   Resolved        Class 2 obesity with body mass index (BMI) of 37.0 to 37.9 in adult    Body mass index is 37.78 kg/m².  Encourage weight loss       Hyperlipidemia      HDL 69   Dietary changes and follow up as outpatient        Essential hypertension    Continue home valsartan         VTE Risk Mitigation (From admission, onward)        Ordered     IP VTE HIGH RISK PATIENT  Once      03/21/19 0521     Reason for No Pharmacological VTE Prophylaxis  Once      03/21/19 0521              Erin Yoder MD  Department of Hospital Medicine   [facility]

## 2019-03-22 NOTE — PLAN OF CARE
Problem: Adult Inpatient Plan of Care  Goal: Plan of Care Review  Outcome: Ongoing (interventions implemented as appropriate)    Pt. AAOx4; calm and cooperative. Pt. Was awake throughout the night. No falls no injures. No distress noted. Abdominal pain relieved with PRN pain meds. Pt. Ambulated to restroom x1 assist. Afebrile. Scheduled med's given without any difficulty. Pt. instructed to call if assistance is needed. Bed in lowest position. Call light in reach. Will continue to with plan of care.      03/22/19 1198   Plan of Care Review   Plan of Care Reviewed With patient

## 2019-03-23 PROBLEM — R11.10 VOMITING: Status: RESOLVED | Noted: 2019-03-21 | Resolved: 2019-03-23

## 2019-03-23 LAB
ALBUMIN SERPL BCP-MCNC: 2.9 G/DL
ALP SERPL-CCNC: 230 U/L
ALT SERPL W/O P-5'-P-CCNC: 300 U/L
ANION GAP SERPL CALC-SCNC: 8 MMOL/L
AST SERPL-CCNC: 148 U/L
BILIRUB SERPL-MCNC: 1.8 MG/DL
BUN SERPL-MCNC: 7 MG/DL
CALCIUM SERPL-MCNC: 8.5 MG/DL
CHLORIDE SERPL-SCNC: 108 MMOL/L
CO2 SERPL-SCNC: 20 MMOL/L
CREAT SERPL-MCNC: 0.7 MG/DL
EST. GFR  (AFRICAN AMERICAN): >60 ML/MIN/1.73 M^2
EST. GFR  (NON AFRICAN AMERICAN): >60 ML/MIN/1.73 M^2
GLUCOSE SERPL-MCNC: 87 MG/DL
LIPASE SERPL-CCNC: 126 U/L
POTASSIUM SERPL-SCNC: 3.9 MMOL/L
PROT SERPL-MCNC: 6 G/DL
SODIUM SERPL-SCNC: 136 MMOL/L

## 2019-03-23 PROCEDURE — 83690 ASSAY OF LIPASE: CPT

## 2019-03-23 PROCEDURE — 25000003 PHARM REV CODE 250: Performed by: HOSPITALIST

## 2019-03-23 PROCEDURE — 11000001 HC ACUTE MED/SURG PRIVATE ROOM

## 2019-03-23 PROCEDURE — 94761 N-INVAS EAR/PLS OXIMETRY MLT: CPT

## 2019-03-23 PROCEDURE — 99900035 HC TECH TIME PER 15 MIN (STAT)

## 2019-03-23 PROCEDURE — 63600175 PHARM REV CODE 636 W HCPCS: Performed by: EMERGENCY MEDICINE

## 2019-03-23 PROCEDURE — 63600175 PHARM REV CODE 636 W HCPCS: Performed by: HOSPITALIST

## 2019-03-23 PROCEDURE — 25000003 PHARM REV CODE 250: Performed by: EMERGENCY MEDICINE

## 2019-03-23 PROCEDURE — 80053 COMPREHEN METABOLIC PANEL: CPT

## 2019-03-23 RX ORDER — CHLORZOXAZONE 500 MG/1
500 TABLET ORAL 4 TIMES DAILY PRN
Status: DISCONTINUED | OUTPATIENT
Start: 2019-03-23 | End: 2019-03-27 | Stop reason: HOSPADM

## 2019-03-23 RX ORDER — HYDROMORPHONE HYDROCHLORIDE 2 MG/ML
1 INJECTION, SOLUTION INTRAMUSCULAR; INTRAVENOUS; SUBCUTANEOUS EVERY 4 HOURS PRN
Status: DISCONTINUED | OUTPATIENT
Start: 2019-03-23 | End: 2019-03-24

## 2019-03-23 RX ORDER — OXYCODONE AND ACETAMINOPHEN 10; 325 MG/1; MG/1
1 TABLET ORAL EVERY 4 HOURS PRN
Status: DISCONTINUED | OUTPATIENT
Start: 2019-03-23 | End: 2019-03-25

## 2019-03-23 RX ADMIN — HYDROMORPHONE HYDROCHLORIDE 2 MG: 2 INJECTION, SOLUTION INTRAMUSCULAR; INTRAVENOUS; SUBCUTANEOUS at 03:03

## 2019-03-23 RX ADMIN — CHLORZOXAZONE 500 MG: 500 TABLET ORAL at 06:03

## 2019-03-23 RX ADMIN — DIPHENHYDRAMINE HYDROCHLORIDE 25 MG: 50 INJECTION, SOLUTION INTRAMUSCULAR; INTRAVENOUS at 10:03

## 2019-03-23 RX ADMIN — CIPROFLOXACIN 400 MG: 2 INJECTION, SOLUTION INTRAVENOUS at 05:03

## 2019-03-23 RX ADMIN — OXYCODONE AND ACETAMINOPHEN 1 TABLET: 10; 325 TABLET ORAL at 05:03

## 2019-03-23 RX ADMIN — MENTHOL, METHYL SALICYLATE: 10; 15 CREAM TOPICAL at 01:03

## 2019-03-23 RX ADMIN — HYDROMORPHONE HYDROCHLORIDE 1 MG: 2 INJECTION, SOLUTION INTRAMUSCULAR; INTRAVENOUS; SUBCUTANEOUS at 09:03

## 2019-03-23 RX ADMIN — SODIUM CHLORIDE: 0.9 INJECTION, SOLUTION INTRAVENOUS at 05:03

## 2019-03-23 RX ADMIN — OXYCODONE AND ACETAMINOPHEN 1 TABLET: 10; 325 TABLET ORAL at 09:03

## 2019-03-23 RX ADMIN — OXYCODONE AND ACETAMINOPHEN 1 TABLET: 10; 325 TABLET ORAL at 01:03

## 2019-03-23 RX ADMIN — DIPHENHYDRAMINE HYDROCHLORIDE 25 MG: 50 INJECTION, SOLUTION INTRAMUSCULAR; INTRAVENOUS at 09:03

## 2019-03-23 RX ADMIN — DOCUSATE SODIUM AND SENNOSIDES 2 TABLET: 8.6; 5 TABLET, FILM COATED ORAL at 09:03

## 2019-03-23 RX ADMIN — PANTOPRAZOLE SODIUM 40 MG: 40 TABLET, DELAYED RELEASE ORAL at 08:03

## 2019-03-23 RX ADMIN — POLYETHYLENE GLYCOL 3350 17 G: 17 POWDER, FOR SOLUTION ORAL at 09:03

## 2019-03-23 RX ADMIN — DIPHENHYDRAMINE HYDROCHLORIDE 25 MG: 50 INJECTION, SOLUTION INTRAMUSCULAR; INTRAVENOUS at 03:03

## 2019-03-23 RX ADMIN — GUAIFENESIN AND DEXTROMETHORPHAN HYDROBROMIDE 1 TABLET: 600; 30 TABLET, EXTENDED RELEASE ORAL at 08:03

## 2019-03-23 RX ADMIN — DOCUSATE SODIUM AND SENNOSIDES 2 TABLET: 8.6; 5 TABLET, FILM COATED ORAL at 08:03

## 2019-03-23 RX ADMIN — VALSARTAN 160 MG: 80 TABLET, FILM COATED ORAL at 08:03

## 2019-03-23 RX ADMIN — PANTOPRAZOLE SODIUM 40 MG: 40 TABLET, DELAYED RELEASE ORAL at 09:03

## 2019-03-23 RX ADMIN — VALSARTAN 160 MG: 80 TABLET, FILM COATED ORAL at 09:03

## 2019-03-23 RX ADMIN — GUAIFENESIN AND DEXTROMETHORPHAN HYDROBROMIDE 1 TABLET: 600; 30 TABLET, EXTENDED RELEASE ORAL at 09:03

## 2019-03-23 NOTE — PROGRESS NOTES
"No new events; stable ON  /86 (BP Location: Right arm, Patient Position: Sitting)   Pulse 95   Temp 98.8 °F (37.1 °C) (Oral)   Resp 16   Ht 5' 1" (1.549 m)   Wt 90.7 kg (199 lb 15.3 oz)   LMP 03/07/2019 (Approximate)   SpO2 (!) 94%   Breastfeeding? No   BMI 37.78 kg/m²   CMP  Sodium   Date Value Ref Range Status   03/23/2019 136 136 - 145 mmol/L Final     Potassium   Date Value Ref Range Status   03/23/2019 3.9 3.5 - 5.1 mmol/L Final     Chloride   Date Value Ref Range Status   03/23/2019 108 95 - 110 mmol/L Final     CO2   Date Value Ref Range Status   03/23/2019 20 (L) 23 - 29 mmol/L Final     Glucose   Date Value Ref Range Status   03/23/2019 87 70 - 110 mg/dL Final     BUN, Bld   Date Value Ref Range Status   03/23/2019 7 6 - 20 mg/dL Final     Creatinine   Date Value Ref Range Status   03/23/2019 0.7 0.5 - 1.4 mg/dL Final     Calcium   Date Value Ref Range Status   03/23/2019 8.5 (L) 8.7 - 10.5 mg/dL Final     Total Protein   Date Value Ref Range Status   03/23/2019 6.0 6.0 - 8.4 g/dL Final     Albumin   Date Value Ref Range Status   03/23/2019 2.9 (L) 3.5 - 5.2 g/dL Final     Total Bilirubin   Date Value Ref Range Status   03/23/2019 1.8 (H) 0.1 - 1.0 mg/dL Final     Comment:     For infants and newborns, interpretation of results should be based  on gestational age, weight and in agreement with clinical  observations.  Premature Infant recommended reference ranges:  Up to 24 hours.............<8.0 mg/dL  Up to 48 hours............<12.0 mg/dL  3-5 days..................<15.0 mg/dL  6-29 days.................<15.0 mg/dL       Alkaline Phosphatase   Date Value Ref Range Status   03/23/2019 230 (H) 55 - 135 U/L Final     AST   Date Value Ref Range Status   03/23/2019 148 (H) 10 - 40 U/L Final     ALT   Date Value Ref Range Status   03/23/2019 300 (H) 10 - 44 U/L Final     Anion Gap   Date Value Ref Range Status   03/23/2019 8 8 - 16 mmol/L Final     eGFR if    Date Value Ref Range " Status   03/23/2019 >60 >60 mL/min/1.73 m^2 Final     eGFR if non    Date Value Ref Range Status   03/23/2019 >60 >60 mL/min/1.73 m^2 Final     Comment:     Calculation used to obtain the estimated glomerular filtration  rate (eGFR) is the CKD-EPI equation.        Imp:  GS Pancreatitis  Abnl LFTs - improving  Plan:  Choley this admit at surgeon's discretion  Monitor LFTs  Call back if they fail to continue to improve

## 2019-03-23 NOTE — SUBJECTIVE & OBJECTIVE
Interval History:  Abdomen still feels sore.  She has intermittent nausea.  No vomiting.  Is passing gas.  No bowel movements.  She does have back pain that she thinks is mostly from sitting in bed    Review of Systems   Constitutional: Positive for appetite change (Improved). Negative for chills and fever.   HENT: Negative for congestion.    Respiratory: Negative for cough, chest tightness and shortness of breath.    Cardiovascular: Negative for chest pain.   Gastrointestinal: Positive for abdominal pain, constipation and nausea. Negative for abdominal distention, diarrhea and vomiting.   Genitourinary: Negative for difficulty urinating.     Objective:     Vital Signs (Most Recent):  Temp: 97.7 °F (36.5 °C) (03/23/19 0723)  Pulse: 97 (03/23/19 0723)  Resp: 17 (03/23/19 0723)  BP: 135/81 (03/23/19 0723)  SpO2: 95 % (03/23/19 0908) Vital Signs (24h Range):  Temp:  [97.7 °F (36.5 °C)-99.2 °F (37.3 °C)] 97.7 °F (36.5 °C)  Pulse:  [] 97  Resp:  [14-18] 17  SpO2:  [93 %-97 %] 95 %  BP: (133-148)/(73-89) 135/81     Weight: 90.7 kg (199 lb 15.3 oz)  Body mass index is 37.78 kg/m².    Intake/Output Summary (Last 24 hours) at 3/23/2019 1035  Last data filed at 3/23/2019 0600  Gross per 24 hour   Intake 240 ml   Output --   Net 240 ml      Physical Exam   Constitutional: She appears well-developed and well-nourished. No distress.   obese   HENT:   Head: Normocephalic and atraumatic.   Nose: Nose normal.   Mouth/Throat: Oropharynx is clear and moist.   Cardiovascular: Normal rate, regular rhythm and normal heart sounds. Exam reveals no gallop and no friction rub.   No murmur heard.  Pulmonary/Chest: Breath sounds normal. No stridor. No respiratory distress. She has no wheezes. She has no rales.   Not taking full breaths. On room air   Abdominal: Bowel sounds are normal. She exhibits no distension and no mass. There is tenderness (With deep palpation in right upper quadrant). There is no rebound and no guarding.    Musculoskeletal: She exhibits no edema.   Neurological: She is alert.   Skin: Skin is warm and dry. No rash noted. She is not diaphoretic. No erythema.   Nursing note and vitals reviewed.      Significant Labs: All pertinent labs within the past 24 hours have been reviewed.    Significant Imaging: I have reviewed and interpreted all pertinent imaging results/findings within the past 24 hours.

## 2019-03-23 NOTE — PROGRESS NOTES
Surgery    The patient reports she feels about the same reports now bilateral flank pain. And some centralized pain but more bilateral flank pain. No nausea no vomiting    Vital signs stable afebrile    Abdomen tenderness throughout no evidence of flank ecchymosis or centralized abdominal ecchymosis    Laboratory data noted    Bilirubin is still elevated    Assessment status post ERCP and sphincterotomy with removal of distal common bile duct stone    Planned cholecystectomy this hospitalization once pancreatitis resolves more.  We will continue to monitor.  Hopefully she will start to improve.  If not may need to image with CT scan.

## 2019-03-23 NOTE — PLAN OF CARE
03/23/19 1229   Discharge Assessment   Prior to hospitilization cognitive status: Alert/Oriented   Prior to hospitalization functional status: Independent   Current cognitive status: Alert/Oriented   Current Functional Status: Independent   Lives With spouse   Able to Return to Prior Arrangements yes   Is patient able to care for self after discharge? Yes   Patient's perception of discharge disposition admitted as an inpatient   Readmission Within the Last 30 Days no previous admission in last 30 days   Patient currently being followed by outpatient case management? No   Patient currently receives any other outside agency services? No   Equipment Currently Used at Home none   Do you have any problems affording any of your prescribed medications? No   Is the patient taking medications as prescribed? yes   Does the patient have transportation home? Yes   Transportation Anticipated family or friend will provide   Does the patient receive services at the [facility]? No   Discharge Plan A Home with family   Discharge Plan B Home with family   DME Needed Upon Discharge  none   Patient/Family in Agreement with Plan yes

## 2019-03-23 NOTE — PLAN OF CARE
Problem: Adult Inpatient Plan of Care  Goal: Plan of Care Review  Outcome: Ongoing (interventions implemented as appropriate)  Pt. AAOx4; calm and cooperative. No falls no injures. No distress noted. Abdominal/back pain relieved with PRN pain meds. Pt. Ambulated to bedside commode. Afebrile. Scheduled med's given without any difficulty. Pt. instructed to call if assistance is needed. Bed in lowest position. Call light in reach. Will continue to with plan of care.         03/23/19 6072   Plan of Care Review   Plan of Care Reviewed With patient

## 2019-03-23 NOTE — PLAN OF CARE
Problem: Adult Inpatient Plan of Care  Goal: Plan of Care Review  Outcome: Ongoing (interventions implemented as appropriate)  Patient is A/Ox4, remains free from falls, is afebrile ,states pain in bilateral flank and midline back is constant and being poorly relieved with ordered medicine and topical cream.  Patient tolerating IV fluids well, ambulates to restroom independently, voids spontaneously.  Patient tolerating diet well, no nausea, no cramping.  Patient encouraged to sit up and chair, walk, and change position frequently to help alleviate back pain, MD notified of unrelieved pain, awaiting new orders.

## 2019-03-23 NOTE — ASSESSMENT & PLAN NOTE
Has characteristic pain, lipase >1000, and changes consistent with pancreatitis of biliary origin on CT  No alcohol use  Lipids elevated but no hypertriglyceridemia ()  Started on IVF and pain control- decrease opioids  GI consulted for ERCP on 3/21- dilation of ducts, stones removed, cholangitis present. Started cipro on 3/21 for cholangitis  Gen Surg consulted for GB removal- will be done when pancreatitis resolves  T bili now decreasing.  Lipase decreasing.  Tolerating full liquid diet

## 2019-03-23 NOTE — PROGRESS NOTES
[facility] Hospital Medicine  Progress Note    Patient Name: Dea Ravi  MRN: [id number]  Patient Class: IP- Inpatient   Admission Date: 3/21/2019  Length of Stay: 2 days  Attending Physician: Erin Riddle MD  Primary Care Provider: Primary Doctor No        Subjective:     Principal Problem:Acute gallstone pancreatitis    HPI:  Ms Dea Ravi is a 53 y.o. woman with HTN and obesity who presents for abdominal pain. She developed 9.5/10 crampy then sharp abdominal pain located across the upper part of her abdomen with no radiation after eating cereal last night. Nothing makes it better (other than pain meds) or worse. Associated with nausea and vomiting. No hematemesis. No fevers or chills. No change in BMs or urination. She has had intermittent crampy pain after eating cereal, corn, nuts (not fatty foods) for months. She has been told that she had gallbladder problems in the past. No alcohol. Takes valsartan only at home; no statin.     In ED, found to have biliary pancreatitis. Given IVF and pain control.     Hospital Course:  Admitted with biliary pancreatitis. Started on IVF and pain control. GI consulted. Patient went for ERCP on 3/21. ERCP showed clean based ulcers/erosions in stomach antrum, dilation of bile duct and common hepatic duct due to obstructing stone, choledocholithiasis, and pus consistent with cholangitis. Stones were removed. She was started on cipro IV BID for cholangitis and pantoprazole BID for ulcers. Surgery consulted for cholecystectomy.  T bili francisco after ERCP as expected from duct swelling; now decreasing.  Patient tolerating a diet.    Interval History:  Abdomen still feels sore.  She has intermittent nausea.  No vomiting.  Is passing gas.  No bowel movements.  She does have back pain that she thinks is mostly from sitting in bed    Review of Systems   Constitutional: Positive for appetite change (Improved). Negative for chills and fever.   HENT: Negative  for congestion.    Respiratory: Negative for cough, chest tightness and shortness of breath.    Cardiovascular: Negative for chest pain.   Gastrointestinal: Positive for abdominal pain, constipation and nausea. Negative for abdominal distention, diarrhea and vomiting.   Genitourinary: Negative for difficulty urinating.     Objective:     Vital Signs (Most Recent):  Temp: 97.7 °F (36.5 °C) (03/23/19 0723)  Pulse: 97 (03/23/19 0723)  Resp: 17 (03/23/19 0723)  BP: 135/81 (03/23/19 0723)  SpO2: 95 % (03/23/19 0908) Vital Signs (24h Range):  Temp:  [97.7 °F (36.5 °C)-99.2 °F (37.3 °C)] 97.7 °F (36.5 °C)  Pulse:  [] 97  Resp:  [14-18] 17  SpO2:  [93 %-97 %] 95 %  BP: (133-148)/(73-89) 135/81     Weight: 90.7 kg (199 lb 15.3 oz)  Body mass index is 37.78 kg/m².    Intake/Output Summary (Last 24 hours) at 3/23/2019 1035  Last data filed at 3/23/2019 0600  Gross per 24 hour   Intake 240 ml   Output --   Net 240 ml      Physical Exam   Constitutional: She appears well-developed and well-nourished. No distress.   obese   HENT:   Head: Normocephalic and atraumatic.   Nose: Nose normal.   Mouth/Throat: Oropharynx is clear and moist.   Cardiovascular: Normal rate, regular rhythm and normal heart sounds. Exam reveals no gallop and no friction rub.   No murmur heard.  Pulmonary/Chest: Breath sounds normal. No stridor. No respiratory distress. She has no wheezes. She has no rales.   Not taking full breaths. On room air   Abdominal: Bowel sounds are normal. She exhibits no distension and no mass. There is tenderness (With deep palpation in right upper quadrant). There is no rebound and no guarding.   Musculoskeletal: She exhibits no edema.   Neurological: She is alert.   Skin: Skin is warm and dry. No rash noted. She is not diaphoretic. No erythema.   Nursing note and vitals reviewed.      Significant Labs: All pertinent labs within the past 24 hours have been reviewed.    Significant Imaging: I have reviewed and interpreted  all pertinent imaging results/findings within the past 24 hours.    Assessment/Plan:      * Acute gallstone pancreatitis    Has characteristic pain, lipase >1000, and changes consistent with pancreatitis of biliary origin on CT  No alcohol use  Lipids elevated but no hypertriglyceridemia ()  Started on IVF and pain control- decrease opioids  GI consulted for ERCP on 3/21- dilation of ducts, stones removed, cholangitis present. Started cipro on 3/21 for cholangitis  Gen Surg consulted for GB removal- will be done when pancreatitis resolves  T bili now decreasing.  Lipase decreasing.  Tolerating full liquid diet         Acute gastric ulcer without hemorrhage or perforation    As seen during ERCP- gastric antral ulcers  PPI BID started- needed until 5/22/2019       Ascending cholangitis    Seen on ERCP 3/21  No signs of sepsis  Started cipro IV on 3/21- continue x7 days (last day 3/27)       Class 2 obesity with body mass index (BMI) of 37.0 to 37.9 in adult    Body mass index is 37.78 kg/m².  Encourage weight loss       Hyperlipidemia      HDL 69   Dietary changes and follow up as outpatient        Essential hypertension    Continue home valsartan         VTE Risk Mitigation (From admission, onward)        Ordered     IP VTE HIGH RISK PATIENT  Once      03/21/19 0521     Reason for No Pharmacological VTE Prophylaxis  Once      03/21/19 0521              Erin Riddle MD  Department of Hospital Medicine   [facility]

## 2019-03-24 LAB
ALBUMIN SERPL BCP-MCNC: 2.7 G/DL (ref 3.5–5.2)
ALP SERPL-CCNC: 202 U/L (ref 55–135)
ALT SERPL W/O P-5'-P-CCNC: 211 U/L (ref 10–44)
ANION GAP SERPL CALC-SCNC: 8 MMOL/L (ref 8–16)
AST SERPL-CCNC: 68 U/L (ref 10–40)
BILIRUB SERPL-MCNC: 1.1 MG/DL (ref 0.1–1)
BUN SERPL-MCNC: 3 MG/DL (ref 6–20)
CALCIUM SERPL-MCNC: 8.1 MG/DL (ref 8.7–10.5)
CHLORIDE SERPL-SCNC: 107 MMOL/L (ref 95–110)
CO2 SERPL-SCNC: 20 MMOL/L (ref 23–29)
CREAT SERPL-MCNC: 0.6 MG/DL (ref 0.5–1.4)
EST. GFR  (AFRICAN AMERICAN): >60 ML/MIN/1.73 M^2
EST. GFR  (NON AFRICAN AMERICAN): >60 ML/MIN/1.73 M^2
GLUCOSE SERPL-MCNC: 85 MG/DL (ref 70–110)
POTASSIUM SERPL-SCNC: 3.2 MMOL/L (ref 3.5–5.1)
PROT SERPL-MCNC: 5.9 G/DL (ref 6–8.4)
SODIUM SERPL-SCNC: 135 MMOL/L (ref 136–145)

## 2019-03-24 PROCEDURE — 25000003 PHARM REV CODE 250: Performed by: HOSPITALIST

## 2019-03-24 PROCEDURE — 63600175 PHARM REV CODE 636 W HCPCS: Performed by: EMERGENCY MEDICINE

## 2019-03-24 PROCEDURE — 36415 COLL VENOUS BLD VENIPUNCTURE: CPT

## 2019-03-24 PROCEDURE — 25000003 PHARM REV CODE 250: Performed by: EMERGENCY MEDICINE

## 2019-03-24 PROCEDURE — 63600175 PHARM REV CODE 636 W HCPCS: Performed by: HOSPITALIST

## 2019-03-24 PROCEDURE — 94761 N-INVAS EAR/PLS OXIMETRY MLT: CPT

## 2019-03-24 PROCEDURE — 80053 COMPREHEN METABOLIC PANEL: CPT

## 2019-03-24 PROCEDURE — 11000001 HC ACUTE MED/SURG PRIVATE ROOM

## 2019-03-24 PROCEDURE — 99900035 HC TECH TIME PER 15 MIN (STAT)

## 2019-03-24 RX ORDER — HYDROMORPHONE HYDROCHLORIDE 2 MG/ML
0.5 INJECTION, SOLUTION INTRAMUSCULAR; INTRAVENOUS; SUBCUTANEOUS EVERY 6 HOURS PRN
Status: DISCONTINUED | OUTPATIENT
Start: 2019-03-24 | End: 2019-03-25

## 2019-03-24 RX ORDER — POTASSIUM CHLORIDE 20 MEQ/1
40 TABLET, EXTENDED RELEASE ORAL ONCE
Status: COMPLETED | OUTPATIENT
Start: 2019-03-24 | End: 2019-03-24

## 2019-03-24 RX ADMIN — HYDROMORPHONE HYDROCHLORIDE 1 MG: 2 INJECTION, SOLUTION INTRAMUSCULAR; INTRAVENOUS; SUBCUTANEOUS at 06:03

## 2019-03-24 RX ADMIN — PANTOPRAZOLE SODIUM 40 MG: 40 TABLET, DELAYED RELEASE ORAL at 09:03

## 2019-03-24 RX ADMIN — DIPHENHYDRAMINE HYDROCHLORIDE 25 MG: 50 INJECTION, SOLUTION INTRAMUSCULAR; INTRAVENOUS at 01:03

## 2019-03-24 RX ADMIN — CIPROFLOXACIN 400 MG: 2 INJECTION, SOLUTION INTRAVENOUS at 04:03

## 2019-03-24 RX ADMIN — OXYCODONE AND ACETAMINOPHEN 1 TABLET: 10; 325 TABLET ORAL at 02:03

## 2019-03-24 RX ADMIN — GUAIFENESIN AND DEXTROMETHORPHAN HYDROBROMIDE 1 TABLET: 600; 30 TABLET, EXTENDED RELEASE ORAL at 09:03

## 2019-03-24 RX ADMIN — SODIUM CHLORIDE: 0.9 INJECTION, SOLUTION INTRAVENOUS at 10:03

## 2019-03-24 RX ADMIN — CHLORZOXAZONE 500 MG: 500 TABLET ORAL at 11:03

## 2019-03-24 RX ADMIN — DIPHENHYDRAMINE HYDROCHLORIDE 25 MG: 50 INJECTION, SOLUTION INTRAMUSCULAR; INTRAVENOUS at 08:03

## 2019-03-24 RX ADMIN — CIPROFLOXACIN 400 MG: 2 INJECTION, SOLUTION INTRAVENOUS at 05:03

## 2019-03-24 RX ADMIN — VALSARTAN 160 MG: 80 TABLET, FILM COATED ORAL at 08:03

## 2019-03-24 RX ADMIN — VALSARTAN 160 MG: 80 TABLET, FILM COATED ORAL at 09:03

## 2019-03-24 RX ADMIN — POLYETHYLENE GLYCOL 3350 17 G: 17 POWDER, FOR SOLUTION ORAL at 09:03

## 2019-03-24 RX ADMIN — GUAIFENESIN AND DEXTROMETHORPHAN HYDROBROMIDE 1 TABLET: 600; 30 TABLET, EXTENDED RELEASE ORAL at 08:03

## 2019-03-24 RX ADMIN — DOCUSATE SODIUM AND SENNOSIDES 2 TABLET: 8.6; 5 TABLET, FILM COATED ORAL at 08:03

## 2019-03-24 RX ADMIN — CHLORZOXAZONE 500 MG: 500 TABLET ORAL at 10:03

## 2019-03-24 RX ADMIN — DIPHENHYDRAMINE HYDROCHLORIDE 25 MG: 50 INJECTION, SOLUTION INTRAMUSCULAR; INTRAVENOUS at 06:03

## 2019-03-24 RX ADMIN — HYDROMORPHONE HYDROCHLORIDE 1 MG: 2 INJECTION, SOLUTION INTRAMUSCULAR; INTRAVENOUS; SUBCUTANEOUS at 01:03

## 2019-03-24 RX ADMIN — OXYCODONE AND ACETAMINOPHEN 1 TABLET: 10; 325 TABLET ORAL at 11:03

## 2019-03-24 RX ADMIN — DOCUSATE SODIUM AND SENNOSIDES 2 TABLET: 8.6; 5 TABLET, FILM COATED ORAL at 09:03

## 2019-03-24 RX ADMIN — HYDROMORPHONE HYDROCHLORIDE 0.5 MG: 2 INJECTION, SOLUTION INTRAMUSCULAR; INTRAVENOUS; SUBCUTANEOUS at 08:03

## 2019-03-24 RX ADMIN — PANTOPRAZOLE SODIUM 40 MG: 40 TABLET, DELAYED RELEASE ORAL at 08:03

## 2019-03-24 RX ADMIN — SODIUM CHLORIDE: 0.9 INJECTION, SOLUTION INTRAVENOUS at 04:03

## 2019-03-24 RX ADMIN — OXYCODONE AND ACETAMINOPHEN 1 TABLET: 10; 325 TABLET ORAL at 09:03

## 2019-03-24 RX ADMIN — POTASSIUM CHLORIDE 40 MEQ: 1500 TABLET, EXTENDED RELEASE ORAL at 10:03

## 2019-03-24 NOTE — PLAN OF CARE
Problem: Adult Inpatient Plan of Care  Goal: Plan of Care Review  Outcome: Ongoing (interventions implemented as appropriate)     03/24/19 5670   Plan of Care Review   Plan of Care Reviewed With patient   Patient received iv antibiotics, tolerating her bland diet, patient had 3 bowel movements today, she is nothing by mouth after midnight

## 2019-03-24 NOTE — PROGRESS NOTES
Surgery    Patient resting well reports still having pain but much improved reports flank pain is improved as major upper back pain most likely from positioning in bed    Centralized abdominal pain improved    Vital signs stable afebrile    Abdomen soft nontender nondistended no flank or centralized ecchymosis    Laboratory data noted    Assessment biliary pancreatitis    Plan-for cholecystectomy in a.m. if all still looks the same laboratory data good.  All discussed at length with patient

## 2019-03-24 NOTE — PROGRESS NOTES
[facility] Hospital Medicine  Progress Note    Patient Name: Dea Ravi  MRN: [id number]  Patient Class: IP- Inpatient   Admission Date: 3/21/2019  Length of Stay: 3 days  Attending Physician: Erin Riddle MD  Primary Care Provider: Primary Doctor No        Subjective:     Principal Problem:Acute gallstone pancreatitis    HPI:  Ms Dea Ravi is a 53 y.o. woman with HTN and obesity who presents for abdominal pain. She developed 9.5/10 crampy then sharp abdominal pain located across the upper part of her abdomen with no radiation after eating cereal last night. Nothing makes it better (other than pain meds) or worse. Associated with nausea and vomiting. No hematemesis. No fevers or chills. No change in BMs or urination. She has had intermittent crampy pain after eating cereal, corn, nuts (not fatty foods) for months. She has been told that she had gallbladder problems in the past. No alcohol. Takes valsartan only at home; no statin.     In ED, found to have biliary pancreatitis. Given IVF and pain control.     Hospital Course:  Admitted with biliary pancreatitis. Started on IVF and pain control. GI consulted. Patient went for ERCP on 3/21. ERCP showed clean based ulcers/erosions in stomach antrum, dilation of bile duct and common hepatic duct due to obstructing stone, choledocholithiasis, and pus consistent with cholangitis. Stones were removed. She was started on cipro IV BID for cholangitis and pantoprazole BID for ulcers. Surgery consulted for cholecystectomy.  T bili francisco after ERCP as expected from duct swelling; now decreasing.  Patient tolerating a diet. Pain is mostly resolved.     Interval History:  Abdominal pain resolved. Tolerating diet. No nausea. No vomiting. Still no BM    Review of Systems   HENT: Negative for congestion.    Respiratory: Negative for cough, chest tightness and shortness of breath.    Cardiovascular: Negative for chest pain.   Gastrointestinal: Positive  for constipation. Negative for abdominal distention, abdominal pain, diarrhea, nausea and vomiting.   Genitourinary: Negative for difficulty urinating.     Objective:     Vital Signs (Most Recent):  Temp: 99 °F (37.2 °C) (03/24/19 1156)  Pulse: 82 (03/24/19 1156)  Resp: 17 (03/24/19 1156)  BP: (!) 148/82 (03/24/19 1156)  SpO2: 97 % (03/24/19 1156) Vital Signs (24h Range):  Temp:  [98.4 °F (36.9 °C)-99.3 °F (37.4 °C)] 99 °F (37.2 °C)  Pulse:  [78-92] 82  Resp:  [16-18] 17  SpO2:  [95 %-98 %] 97 %  BP: (114-175)/(70-97) 148/82     Weight: 90.7 kg (199 lb 15.3 oz)  Body mass index is 37.78 kg/m².    Intake/Output Summary (Last 24 hours) at 3/24/2019 1235  Last data filed at 3/24/2019 0741  Gross per 24 hour   Intake 360 ml   Output --   Net 360 ml      Physical Exam   Constitutional: She appears well-developed and well-nourished. No distress.   obese   HENT:   Head: Normocephalic and atraumatic.   Nose: Nose normal.   Mouth/Throat: Oropharynx is clear and moist.   Cardiovascular: Normal rate, regular rhythm and normal heart sounds. Exam reveals no gallop and no friction rub.   No murmur heard.  Pulmonary/Chest: Effort normal and breath sounds normal. No stridor. No respiratory distress. She has no wheezes. She has no rales.   On room air   Abdominal: Soft. Bowel sounds are normal. She exhibits no distension and no mass. There is no tenderness. There is no rebound and no guarding.   Musculoskeletal: She exhibits no edema.   Neurological: She is alert.   Skin: Skin is warm and dry. No rash noted. She is not diaphoretic. No erythema.   Nursing note and vitals reviewed.      Significant Labs: All pertinent labs within the past 24 hours have been reviewed.    Significant Imaging: I have reviewed and interpreted all pertinent imaging results/findings within the past 24 hours.    Assessment/Plan:      * Acute gallstone pancreatitis  Has characteristic pain, lipase >1000, and changes consistent with pancreatitis of biliary  origin on CT  No alcohol use  Lipids elevated but no hypertriglyceridemia ()  Started on IVF and pain control- decrease opioids  GI consulted for ERCP on 3/21- dilation of ducts, stones removed, cholangitis present. Started cipro on 3/21 for cholangitis  Gen Surg consulted for GB removal- will be done when pancreatitis resolves  T bili now decreasing.  Lipase decreasing.   Tolerating full liquid diet. Pain resolved.  Plan for cholecystectomy on 3/25        Acute gastric ulcer without hemorrhage or perforation  As seen during ERCP- gastric antral ulcers  PPI BID started- needed until 5/22/2019      Ascending cholangitis  Seen on ERCP 3/21  No signs of sepsis  Started cipro IV on 3/21- continue x7 days (last day 3/27)      Class 2 obesity with body mass index (BMI) of 37.0 to 37.9 in adult  Body mass index is 37.78 kg/m².  Encourage weight loss      Hyperlipidemia    HDL 69   Dietary changes and follow up as outpatient       Essential hypertension  Continue home valsartan        VTE Risk Mitigation (From admission, onward)        Ordered     IP VTE HIGH RISK PATIENT  Once      03/21/19 0521     Reason for No Pharmacological VTE Prophylaxis  Once      03/21/19 0521              Erin Riddle MD  Department of Hospital Medicine   [facility]

## 2019-03-24 NOTE — PLAN OF CARE
Problem: Adult Inpatient Plan of Care  Goal: Plan of Care Review  Outcome: Ongoing (interventions implemented as appropriate)    Pt. AAOx4; calm and cooperative. No falls no injures. No distress noted. Abdominal/back pain relieved with PRN pain meds. Pt. Ambulated to restroom and around unit. Afebrile. Scheduled med's given without any difficulty. Pt. instructed to call if assistance is needed. Bed in lowest position. Call light in reach. Will continue to with plan of care.      03/24/19 1929   Plan of Care Review   Plan of Care Reviewed With patient

## 2019-03-24 NOTE — ASSESSMENT & PLAN NOTE
Has characteristic pain, lipase >1000, and changes consistent with pancreatitis of biliary origin on CT  No alcohol use  Lipids elevated but no hypertriglyceridemia ()  Started on IVF and pain control- decrease opioids  GI consulted for ERCP on 3/21- dilation of ducts, stones removed, cholangitis present. Started cipro on 3/21 for cholangitis  Gen Surg consulted for GB removal- will be done when pancreatitis resolves  T bili now decreasing.  Lipase decreasing.   Tolerating full liquid diet. Pain resolved.  Plan for cholecystectomy on 3/25

## 2019-03-24 NOTE — SUBJECTIVE & OBJECTIVE
Interval History:  Abdominal pain resolved. Tolerating diet. No nausea. No vomiting. Still no BM    Review of Systems   HENT: Negative for congestion.    Respiratory: Negative for cough, chest tightness and shortness of breath.    Cardiovascular: Negative for chest pain.   Gastrointestinal: Positive for constipation. Negative for abdominal distention, abdominal pain, diarrhea, nausea and vomiting.   Genitourinary: Negative for difficulty urinating.     Objective:     Vital Signs (Most Recent):  Temp: 99 °F (37.2 °C) (03/24/19 1156)  Pulse: 82 (03/24/19 1156)  Resp: 17 (03/24/19 1156)  BP: (!) 148/82 (03/24/19 1156)  SpO2: 97 % (03/24/19 1156) Vital Signs (24h Range):  Temp:  [98.4 °F (36.9 °C)-99.3 °F (37.4 °C)] 99 °F (37.2 °C)  Pulse:  [78-92] 82  Resp:  [16-18] 17  SpO2:  [95 %-98 %] 97 %  BP: (114-175)/(70-97) 148/82     Weight: 90.7 kg (199 lb 15.3 oz)  Body mass index is 37.78 kg/m².    Intake/Output Summary (Last 24 hours) at 3/24/2019 1235  Last data filed at 3/24/2019 0741  Gross per 24 hour   Intake 360 ml   Output --   Net 360 ml      Physical Exam   Constitutional: She appears well-developed and well-nourished. No distress.   obese   HENT:   Head: Normocephalic and atraumatic.   Nose: Nose normal.   Mouth/Throat: Oropharynx is clear and moist.   Cardiovascular: Normal rate, regular rhythm and normal heart sounds. Exam reveals no gallop and no friction rub.   No murmur heard.  Pulmonary/Chest: Effort normal and breath sounds normal. No stridor. No respiratory distress. She has no wheezes. She has no rales.   On room air   Abdominal: Soft. Bowel sounds are normal. She exhibits no distension and no mass. There is no tenderness. There is no rebound and no guarding.   Musculoskeletal: She exhibits no edema.   Neurological: She is alert.   Skin: Skin is warm and dry. No rash noted. She is not diaphoretic. No erythema.   Nursing note and vitals reviewed.      Significant Labs: All pertinent labs within the past  24 hours have been reviewed.    Significant Imaging: I have reviewed and interpreted all pertinent imaging results/findings within the past 24 hours.

## 2019-03-24 NOTE — NURSING
Pt. Up in chair; states minimal back pain. No distress. IV currently infusing. Family at bedside. Pt. Instructed to call if assistance is needed. Will continue to monitor.

## 2019-03-25 ENCOUNTER — ANESTHESIA (OUTPATIENT)
Dept: SURGERY | Facility: HOSPITAL | Age: 54
DRG: 417 | End: 2019-03-25
Payer: OTHER GOVERNMENT

## 2019-03-25 ENCOUNTER — ANESTHESIA EVENT (OUTPATIENT)
Dept: SURGERY | Facility: HOSPITAL | Age: 54
DRG: 417 | End: 2019-03-25
Payer: OTHER GOVERNMENT

## 2019-03-25 LAB
ALBUMIN SERPL BCP-MCNC: 2.8 G/DL (ref 3.5–5.2)
ALBUMIN SERPL BCP-MCNC: 2.8 G/DL (ref 3.5–5.2)
ALP SERPL-CCNC: 174 U/L (ref 55–135)
ALP SERPL-CCNC: 174 U/L (ref 55–135)
ALT SERPL W/O P-5'-P-CCNC: 151 U/L (ref 10–44)
ALT SERPL W/O P-5'-P-CCNC: 152 U/L (ref 10–44)
ANION GAP SERPL CALC-SCNC: 7 MMOL/L (ref 8–16)
AST SERPL-CCNC: 33 U/L (ref 10–40)
AST SERPL-CCNC: 33 U/L (ref 10–40)
BILIRUB DIRECT SERPL-MCNC: 0.6 MG/DL (ref 0.1–0.3)
BILIRUB SERPL-MCNC: 0.8 MG/DL (ref 0.1–1)
BILIRUB SERPL-MCNC: 0.8 MG/DL (ref 0.1–1)
BUN SERPL-MCNC: 3 MG/DL (ref 6–20)
CALCIUM SERPL-MCNC: 8.4 MG/DL (ref 8.7–10.5)
CHLORIDE SERPL-SCNC: 106 MMOL/L (ref 95–110)
CO2 SERPL-SCNC: 26 MMOL/L (ref 23–29)
CREAT SERPL-MCNC: 0.6 MG/DL (ref 0.5–1.4)
EST. GFR  (AFRICAN AMERICAN): >60 ML/MIN/1.73 M^2
EST. GFR  (NON AFRICAN AMERICAN): >60 ML/MIN/1.73 M^2
GLUCOSE SERPL-MCNC: 86 MG/DL (ref 70–110)
LIPASE SERPL-CCNC: 88 U/L (ref 4–60)
POTASSIUM SERPL-SCNC: 3.1 MMOL/L (ref 3.5–5.1)
PROT SERPL-MCNC: 6.3 G/DL (ref 6–8.4)
PROT SERPL-MCNC: 6.3 G/DL (ref 6–8.4)
SODIUM SERPL-SCNC: 139 MMOL/L (ref 136–145)

## 2019-03-25 PROCEDURE — 25500020 PHARM REV CODE 255: Performed by: SURGERY

## 2019-03-25 PROCEDURE — 63600175 PHARM REV CODE 636 W HCPCS: Performed by: SURGERY

## 2019-03-25 PROCEDURE — 25000003 PHARM REV CODE 250: Performed by: SURGERY

## 2019-03-25 PROCEDURE — S0030 INJECTION, METRONIDAZOLE: HCPCS | Performed by: SURGERY

## 2019-03-25 PROCEDURE — 37000009 HC ANESTHESIA EA ADD 15 MINS: Performed by: SURGERY

## 2019-03-25 PROCEDURE — 36000709 HC OR TIME LEV III EA ADD 15 MIN: Performed by: SURGERY

## 2019-03-25 PROCEDURE — 80053 COMPREHEN METABOLIC PANEL: CPT

## 2019-03-25 PROCEDURE — 94761 N-INVAS EAR/PLS OXIMETRY MLT: CPT

## 2019-03-25 PROCEDURE — C1729 CATH, DRAINAGE: HCPCS | Performed by: SURGERY

## 2019-03-25 PROCEDURE — 71000033 HC RECOVERY, INTIAL HOUR: Performed by: SURGERY

## 2019-03-25 PROCEDURE — 88304 TISSUE SPECIMEN TO PATHOLOGY - SURGERY: ICD-10-PCS | Mod: 26,,, | Performed by: PATHOLOGY

## 2019-03-25 PROCEDURE — 63600175 PHARM REV CODE 636 W HCPCS: Performed by: EMERGENCY MEDICINE

## 2019-03-25 PROCEDURE — 80076 HEPATIC FUNCTION PANEL: CPT

## 2019-03-25 PROCEDURE — 63600175 PHARM REV CODE 636 W HCPCS: Performed by: ANESTHESIOLOGY

## 2019-03-25 PROCEDURE — D9220A PRA ANESTHESIA: Mod: ,,, | Performed by: ANESTHESIOLOGY

## 2019-03-25 PROCEDURE — 63600175 PHARM REV CODE 636 W HCPCS: Performed by: HOSPITALIST

## 2019-03-25 PROCEDURE — 36000708 HC OR TIME LEV III 1ST 15 MIN: Performed by: SURGERY

## 2019-03-25 PROCEDURE — 25000003 PHARM REV CODE 250: Performed by: EMERGENCY MEDICINE

## 2019-03-25 PROCEDURE — 83690 ASSAY OF LIPASE: CPT

## 2019-03-25 PROCEDURE — D9220A PRA ANESTHESIA: ICD-10-PCS | Mod: ,,, | Performed by: ANESTHESIOLOGY

## 2019-03-25 PROCEDURE — 25000003 PHARM REV CODE 250: Performed by: HOSPITALIST

## 2019-03-25 PROCEDURE — 25000003 PHARM REV CODE 250: Performed by: NURSE ANESTHETIST, CERTIFIED REGISTERED

## 2019-03-25 PROCEDURE — 37000008 HC ANESTHESIA 1ST 15 MINUTES: Performed by: SURGERY

## 2019-03-25 PROCEDURE — 71000039 HC RECOVERY, EACH ADD'L HOUR: Performed by: SURGERY

## 2019-03-25 PROCEDURE — 63600175 PHARM REV CODE 636 W HCPCS: Performed by: NURSE ANESTHETIST, CERTIFIED REGISTERED

## 2019-03-25 PROCEDURE — 88304 TISSUE EXAM BY PATHOLOGIST: CPT | Mod: 26,,, | Performed by: PATHOLOGY

## 2019-03-25 PROCEDURE — 11000001 HC ACUTE MED/SURG PRIVATE ROOM

## 2019-03-25 PROCEDURE — 88304 TISSUE EXAM BY PATHOLOGIST: CPT | Performed by: PATHOLOGY

## 2019-03-25 PROCEDURE — 27201423 OPTIME MED/SURG SUP & DEVICES STERILE SUPPLY: Performed by: SURGERY

## 2019-03-25 RX ORDER — FENTANYL CITRATE 50 UG/ML
25 INJECTION, SOLUTION INTRAMUSCULAR; INTRAVENOUS EVERY 5 MIN PRN
Status: COMPLETED | OUTPATIENT
Start: 2019-03-25 | End: 2019-03-25

## 2019-03-25 RX ORDER — AMOXICILLIN 250 MG
2 CAPSULE ORAL 2 TIMES DAILY
Status: DISCONTINUED | OUTPATIENT
Start: 2019-03-26 | End: 2019-03-27 | Stop reason: HOSPADM

## 2019-03-25 RX ORDER — SUCCINYLCHOLINE CHLORIDE 20 MG/ML
INJECTION INTRAMUSCULAR; INTRAVENOUS
Status: DISCONTINUED | OUTPATIENT
Start: 2019-03-25 | End: 2019-03-25

## 2019-03-25 RX ORDER — IBUPROFEN 400 MG/1
400 TABLET ORAL EVERY 6 HOURS PRN
Status: DISCONTINUED | OUTPATIENT
Start: 2019-03-25 | End: 2019-03-27 | Stop reason: HOSPADM

## 2019-03-25 RX ORDER — FENTANYL CITRATE 50 UG/ML
INJECTION, SOLUTION INTRAMUSCULAR; INTRAVENOUS
Status: DISCONTINUED | OUTPATIENT
Start: 2019-03-25 | End: 2019-03-25

## 2019-03-25 RX ORDER — LIDOCAINE HCL/PF 100 MG/5ML
SYRINGE (ML) INTRAVENOUS
Status: DISCONTINUED | OUTPATIENT
Start: 2019-03-25 | End: 2019-03-25

## 2019-03-25 RX ORDER — METOCLOPRAMIDE HYDROCHLORIDE 5 MG/ML
INJECTION INTRAMUSCULAR; INTRAVENOUS
Status: DISCONTINUED | OUTPATIENT
Start: 2019-03-25 | End: 2019-03-25

## 2019-03-25 RX ORDER — METRONIDAZOLE 500 MG/100ML
500 INJECTION, SOLUTION INTRAVENOUS
Status: DISCONTINUED | OUTPATIENT
Start: 2019-03-25 | End: 2019-03-27 | Stop reason: HOSPADM

## 2019-03-25 RX ORDER — GLYCOPYRROLATE 0.2 MG/ML
INJECTION INTRAMUSCULAR; INTRAVENOUS
Status: DISCONTINUED | OUTPATIENT
Start: 2019-03-25 | End: 2019-03-25

## 2019-03-25 RX ORDER — ENOXAPARIN SODIUM 100 MG/ML
40 INJECTION SUBCUTANEOUS EVERY 24 HOURS
Status: DISCONTINUED | OUTPATIENT
Start: 2019-03-26 | End: 2019-03-27 | Stop reason: HOSPADM

## 2019-03-25 RX ORDER — NEOSTIGMINE METHYLSULFATE 1 MG/ML
INJECTION, SOLUTION INTRAVENOUS
Status: DISCONTINUED | OUTPATIENT
Start: 2019-03-25 | End: 2019-03-25

## 2019-03-25 RX ORDER — SODIUM CHLORIDE 9 MG/ML
INJECTION, SOLUTION INTRAVENOUS CONTINUOUS
Status: DISCONTINUED | OUTPATIENT
Start: 2019-03-25 | End: 2019-03-27

## 2019-03-25 RX ORDER — ONDANSETRON 2 MG/ML
4 INJECTION INTRAMUSCULAR; INTRAVENOUS ONCE
Status: COMPLETED | OUTPATIENT
Start: 2019-03-25 | End: 2019-03-25

## 2019-03-25 RX ORDER — SODIUM CHLORIDE, SODIUM LACTATE, POTASSIUM CHLORIDE, CALCIUM CHLORIDE 600; 310; 30; 20 MG/100ML; MG/100ML; MG/100ML; MG/100ML
INJECTION, SOLUTION INTRAVENOUS CONTINUOUS PRN
Status: DISCONTINUED | OUTPATIENT
Start: 2019-03-25 | End: 2019-03-25

## 2019-03-25 RX ORDER — ONDANSETRON 2 MG/ML
INJECTION INTRAMUSCULAR; INTRAVENOUS
Status: DISCONTINUED | OUTPATIENT
Start: 2019-03-25 | End: 2019-03-25

## 2019-03-25 RX ORDER — ROCURONIUM BROMIDE 10 MG/ML
INJECTION, SOLUTION INTRAVENOUS
Status: DISCONTINUED | OUTPATIENT
Start: 2019-03-25 | End: 2019-03-25

## 2019-03-25 RX ORDER — PROPOFOL 10 MG/ML
VIAL (ML) INTRAVENOUS
Status: DISCONTINUED | OUTPATIENT
Start: 2019-03-25 | End: 2019-03-25

## 2019-03-25 RX ORDER — MIDAZOLAM HYDROCHLORIDE 1 MG/ML
INJECTION, SOLUTION INTRAMUSCULAR; INTRAVENOUS
Status: DISCONTINUED | OUTPATIENT
Start: 2019-03-25 | End: 2019-03-25

## 2019-03-25 RX ORDER — HYDROMORPHONE HYDROCHLORIDE 2 MG/ML
1 INJECTION, SOLUTION INTRAMUSCULAR; INTRAVENOUS; SUBCUTANEOUS
Status: DISCONTINUED | OUTPATIENT
Start: 2019-03-25 | End: 2019-03-26

## 2019-03-25 RX ORDER — LORAZEPAM 2 MG/ML
0.25 INJECTION INTRAMUSCULAR
Status: DISCONTINUED | OUTPATIENT
Start: 2019-03-25 | End: 2019-03-27

## 2019-03-25 RX ORDER — HYDROMORPHONE HYDROCHLORIDE 2 MG/ML
0.2 INJECTION, SOLUTION INTRAMUSCULAR; INTRAVENOUS; SUBCUTANEOUS EVERY 5 MIN PRN
Status: COMPLETED | OUTPATIENT
Start: 2019-03-25 | End: 2019-03-25

## 2019-03-25 RX ORDER — OXYCODONE AND ACETAMINOPHEN 10; 325 MG/1; MG/1
1 TABLET ORAL EVERY 4 HOURS PRN
Status: DISCONTINUED | OUTPATIENT
Start: 2019-03-25 | End: 2019-03-27 | Stop reason: HOSPADM

## 2019-03-25 RX ORDER — SODIUM CHLORIDE 0.9 % (FLUSH) 0.9 %
3 SYRINGE (ML) INJECTION
Status: DISCONTINUED | OUTPATIENT
Start: 2019-03-25 | End: 2019-03-27 | Stop reason: HOSPADM

## 2019-03-25 RX ADMIN — ROCURONIUM BROMIDE 20 MG: 10 INJECTION, SOLUTION INTRAVENOUS at 10:03

## 2019-03-25 RX ADMIN — CHLORZOXAZONE 500 MG: 500 TABLET ORAL at 09:03

## 2019-03-25 RX ADMIN — FENTANYL CITRATE 25 MCG: 50 INJECTION, SOLUTION INTRAMUSCULAR; INTRAVENOUS at 01:03

## 2019-03-25 RX ADMIN — HYDROMORPHONE HYDROCHLORIDE 0.2 MG: 2 INJECTION, SOLUTION INTRAMUSCULAR; INTRAVENOUS; SUBCUTANEOUS at 12:03

## 2019-03-25 RX ADMIN — HYDROMORPHONE HYDROCHLORIDE 1 MG: 2 INJECTION, SOLUTION INTRAMUSCULAR; INTRAVENOUS; SUBCUTANEOUS at 10:03

## 2019-03-25 RX ADMIN — FENTANYL CITRATE 100 MCG: 50 INJECTION, SOLUTION INTRAMUSCULAR; INTRAVENOUS at 09:03

## 2019-03-25 RX ADMIN — SODIUM CHLORIDE, SODIUM LACTATE, POTASSIUM CHLORIDE, AND CALCIUM CHLORIDE: .6; .31; .03; .02 INJECTION, SOLUTION INTRAVENOUS at 09:03

## 2019-03-25 RX ADMIN — PANTOPRAZOLE SODIUM 40 MG: 40 TABLET, DELAYED RELEASE ORAL at 09:03

## 2019-03-25 RX ADMIN — VALSARTAN 160 MG: 80 TABLET, FILM COATED ORAL at 09:03

## 2019-03-25 RX ADMIN — OXYCODONE HYDROCHLORIDE AND ACETAMINOPHEN 1 TABLET: 10; 325 TABLET ORAL at 01:03

## 2019-03-25 RX ADMIN — CHLORZOXAZONE 500 MG: 500 TABLET ORAL at 02:03

## 2019-03-25 RX ADMIN — METRONIDAZOLE 500 MG: 500 INJECTION, SOLUTION INTRAVENOUS at 08:03

## 2019-03-25 RX ADMIN — ONDANSETRON 4 MG: 2 INJECTION INTRAMUSCULAR; INTRAVENOUS at 01:03

## 2019-03-25 RX ADMIN — ROCURONIUM BROMIDE 5 MG: 10 INJECTION, SOLUTION INTRAVENOUS at 09:03

## 2019-03-25 RX ADMIN — METRONIDAZOLE 500 MG: 500 INJECTION, SOLUTION INTRAVENOUS at 03:03

## 2019-03-25 RX ADMIN — DIPHENHYDRAMINE HYDROCHLORIDE 25 MG: 50 INJECTION, SOLUTION INTRAMUSCULAR; INTRAVENOUS at 03:03

## 2019-03-25 RX ADMIN — LIDOCAINE HYDROCHLORIDE 100 MG: 20 INJECTION, SOLUTION INTRAVENOUS at 09:03

## 2019-03-25 RX ADMIN — GLYCOPYRROLATE 0.4 MG: 0.2 INJECTION, SOLUTION INTRAMUSCULAR; INTRAVENOUS at 11:03

## 2019-03-25 RX ADMIN — FENTANYL CITRATE 50 MCG: 50 INJECTION, SOLUTION INTRAMUSCULAR; INTRAVENOUS at 11:03

## 2019-03-25 RX ADMIN — MIDAZOLAM HYDROCHLORIDE 2 MG: 1 INJECTION, SOLUTION INTRAMUSCULAR; INTRAVENOUS at 09:03

## 2019-03-25 RX ADMIN — DIPHENHYDRAMINE HYDROCHLORIDE 25 MG: 50 INJECTION, SOLUTION INTRAMUSCULAR; INTRAVENOUS at 10:03

## 2019-03-25 RX ADMIN — CIPROFLOXACIN 400 MG: 2 INJECTION, SOLUTION INTRAVENOUS at 06:03

## 2019-03-25 RX ADMIN — PROPOFOL 150 MG: 10 INJECTION, EMULSION INTRAVENOUS at 09:03

## 2019-03-25 RX ADMIN — HYDROMORPHONE HYDROCHLORIDE 0.2 MG: 2 INJECTION, SOLUTION INTRAMUSCULAR; INTRAVENOUS; SUBCUTANEOUS at 11:03

## 2019-03-25 RX ADMIN — HYDROMORPHONE HYDROCHLORIDE 0.5 MG: 2 INJECTION, SOLUTION INTRAMUSCULAR; INTRAVENOUS; SUBCUTANEOUS at 06:03

## 2019-03-25 RX ADMIN — VALSARTAN 160 MG: 80 TABLET, FILM COATED ORAL at 08:03

## 2019-03-25 RX ADMIN — HYDROMORPHONE HYDROCHLORIDE 1 MG: 2 INJECTION, SOLUTION INTRAMUSCULAR; INTRAVENOUS; SUBCUTANEOUS at 06:03

## 2019-03-25 RX ADMIN — SODIUM CHLORIDE: 0.9 INJECTION, SOLUTION INTRAVENOUS at 06:03

## 2019-03-25 RX ADMIN — NEOSTIGMINE METHYLSULFATE 2.5 MG: 1 INJECTION INTRAVENOUS at 11:03

## 2019-03-25 RX ADMIN — SUCCINYLCHOLINE CHLORIDE 100 MG: 20 INJECTION, SOLUTION INTRAMUSCULAR; INTRAVENOUS at 09:03

## 2019-03-25 RX ADMIN — HYDROMORPHONE HYDROCHLORIDE 1 MG: 2 INJECTION, SOLUTION INTRAMUSCULAR; INTRAVENOUS; SUBCUTANEOUS at 03:03

## 2019-03-25 RX ADMIN — PROPOFOL 20 MG: 10 INJECTION, EMULSION INTRAVENOUS at 11:03

## 2019-03-25 RX ADMIN — PROPOFOL 30 MG: 10 INJECTION, EMULSION INTRAVENOUS at 11:03

## 2019-03-25 RX ADMIN — ROCURONIUM BROMIDE 10 MG: 10 INJECTION, SOLUTION INTRAVENOUS at 11:03

## 2019-03-25 RX ADMIN — ONDANSETRON 4 MG: 2 INJECTION, SOLUTION INTRAMUSCULAR; INTRAVENOUS at 09:03

## 2019-03-25 RX ADMIN — ROCURONIUM BROMIDE 10 MG: 10 INJECTION, SOLUTION INTRAVENOUS at 10:03

## 2019-03-25 RX ADMIN — DIPHENHYDRAMINE HYDROCHLORIDE 25 MG: 50 INJECTION, SOLUTION INTRAMUSCULAR; INTRAVENOUS at 06:03

## 2019-03-25 RX ADMIN — METOCLOPRAMIDE 10 MG: 5 INJECTION, SOLUTION INTRAMUSCULAR; INTRAVENOUS at 09:03

## 2019-03-25 RX ADMIN — GUAIFENESIN AND DEXTROMETHORPHAN HYDROBROMIDE 1 TABLET: 600; 30 TABLET, EXTENDED RELEASE ORAL at 09:03

## 2019-03-25 RX ADMIN — SODIUM CHLORIDE: 0.9 INJECTION, SOLUTION INTRAVENOUS at 01:03

## 2019-03-25 RX ADMIN — FENTANYL CITRATE 50 MCG: 50 INJECTION, SOLUTION INTRAMUSCULAR; INTRAVENOUS at 10:03

## 2019-03-25 RX ADMIN — ROCURONIUM BROMIDE 5 MG: 10 INJECTION, SOLUTION INTRAVENOUS at 11:03

## 2019-03-25 NOTE — ANESTHESIA PREPROCEDURE EVALUATION
03/25/2019  Dea Ravi is a 53 y.o., female.    Anesthesia Evaluation    I have reviewed the Patient Summary Reports.    I have reviewed the Nursing Notes.   I have reviewed the Medications.     Review of Systems  Anesthesia Hx:  No problems with previous Anesthesia  History of prior surgery of interest to airway management or planning: Previous anesthesia: MAC Denies Family Hx of Anesthesia complications.   Denies Personal Hx of Anesthesia complications.   Social:  Non-Smoker    Cardiovascular:   Exercise tolerance: good Hypertension    Hepatic/GI:   PUD, Pancreatitis, resolving  Ascending Cholangitis        Physical Exam  General:  Well nourished    Airway/Jaw/Neck:  Airway Findings: Mouth Opening: Normal General Airway Assessment: Adult  Mallampati: II  TM Distance: Normal, at least 6 cm      Dental:  Dental Findings: In tact        Mental Status:  Mental Status Findings:  Cooperative, Alert and Oriented         Anesthesia Plan  Type of Anesthesia, risks & benefits discussed:  Anesthesia Type:  general  Patient's Preference:   Intra-op Monitoring Plan: standard ASA monitors  Intra-op Monitoring Plan Comments:   Post Op Pain Control Plan: multimodal analgesia, IV/PO Opioids PRN and per primary service following discharge from PACU  Post Op Pain Control Plan Comments:   Induction:   IV  Beta Blocker:  Patient is not currently on a Beta-Blocker (No further documentation required).       Informed Consent: Patient understands risks and agrees with Anesthesia plan.  Questions answered. Anesthesia consent signed with patient.  ASA Score: 2     Day of Surgery Review of History & Physical: I have interviewed and examined the patient. I have reviewed the patient's H&P dated: 03/24/19. There are no significant changes.   H&P completed by Anesthesiologist.   Anesthesia Plan Notes: Pt with acute pancreatitis,  resolving.   Ascending cholangitis. No signs of sepsis.         Ready For Surgery From Anesthesia Perspective.

## 2019-03-25 NOTE — PROGRESS NOTES
[facility] Hospital Medicine  Progress Note    Patient Name: Dea Ravi  MRN: [id number]  Patient Class: IP- Inpatient   Admission Date: 3/21/2019  Length of Stay: 4 days  Attending Physician: Erin Riddle MD  Primary Care Provider: Primary Doctor No        Subjective:     Principal Problem:Acute gallstone pancreatitis    HPI:  Ms Dea Ravi is a 53 y.o. woman with HTN and obesity who presents for abdominal pain. She developed 9.5/10 crampy then sharp abdominal pain located across the upper part of her abdomen with no radiation after eating cereal last night. Nothing makes it better (other than pain meds) or worse. Associated with nausea and vomiting. No hematemesis. No fevers or chills. No change in BMs or urination. She has had intermittent crampy pain after eating cereal, corn, nuts (not fatty foods) for months. She has been told that she had gallbladder problems in the past. No alcohol. Takes valsartan only at home; no statin.     In ED, found to have biliary pancreatitis. Given IVF and pain control.     Hospital Course:  Admitted with biliary pancreatitis. Started on IVF and pain control. GI consulted. Patient went for ERCP on 3/21. ERCP showed clean based ulcers/erosions in stomach antrum, dilation of bile duct and common hepatic duct due to obstructing stone, choledocholithiasis, and pus consistent with cholangitis. Stones were removed. She was started on cipro IV BID for cholangitis and pantoprazole BID for ulcers. Surgery consulted for cholecystectomy.  T bili francisco after ERCP as expected from duct swelling; now decreasing.  Patient tolerating a diet. Pain is resolved. Went for lap cholecystectomy on 3/25.     Interval History:  No complaints. NPO for lap armando today     Review of Systems   HENT: Negative for congestion.    Respiratory: Negative for cough, chest tightness and shortness of breath.    Cardiovascular: Negative for chest pain.   Gastrointestinal: Negative for  abdominal distention, abdominal pain, constipation, diarrhea, nausea and vomiting.   Genitourinary: Negative for difficulty urinating.     Objective:     Vital Signs (Most Recent):  Temp: 97.5 °F (36.4 °C) (03/25/19 1205)  Pulse: 88 (03/25/19 1255)  Resp: 19 (03/25/19 1255)  BP: (!) 170/77 (03/25/19 1250)  SpO2: 98 % (03/25/19 1255) Vital Signs (24h Range):  Temp:  [97.5 °F (36.4 °C)-99.3 °F (37.4 °C)] 97.5 °F (36.4 °C)  Pulse:  [52-90] 88  Resp:  [16-30] 19  SpO2:  [95 %-100 %] 98 %  BP: (112-188)/(65-96) 170/77     Weight: 90.7 kg (199 lb 15.3 oz)  Body mass index is 37.78 kg/m².    Intake/Output Summary (Last 24 hours) at 3/25/2019 1307  Last data filed at 3/25/2019 1208  Gross per 24 hour   Intake 1940 ml   Output --   Net 1940 ml      Physical Exam   Constitutional: She appears well-developed and well-nourished. No distress.   obese   HENT:   Head: Normocephalic and atraumatic.   Nose: Nose normal.   Mouth/Throat: Oropharynx is clear and moist.   Cardiovascular: Normal rate, regular rhythm and normal heart sounds. Exam reveals no gallop and no friction rub.   No murmur heard.  Pulmonary/Chest: Effort normal and breath sounds normal. No stridor. No respiratory distress. She has no wheezes. She has no rales.   On room air   Abdominal: Soft. Bowel sounds are normal. She exhibits no distension and no mass. There is no tenderness. There is no rebound and no guarding.   Musculoskeletal: She exhibits no edema.   Neurological: She is alert.   Skin: Skin is warm and dry. No rash noted. She is not diaphoretic. No erythema.   Nursing note and vitals reviewed.      Significant Labs: All pertinent labs within the past 24 hours have been reviewed.    Significant Imaging: I have reviewed and interpreted all pertinent imaging results/findings within the past 24 hours.    Assessment/Plan:      * Acute gallstone pancreatitis  Has characteristic pain, lipase >1000, and changes consistent with pancreatitis of biliary origin on  CT  No alcohol use  Lipids elevated but no hypertriglyceridemia ()  Started on IVF and pain control- decrease opioids  GI consulted for ERCP on 3/21- dilation of ducts, stones removed, cholangitis present. Started cipro on 3/21 for cholangitis  Gen Surg consulted for GB removal- will be done when pancreatitis resolves  T bili now decreasing.  Lipase decreasing.   Tolerating diet. Pain resolved  S/p lap cholecystectomy on 3/25        Acute gastric ulcer without hemorrhage or perforation  As seen during ERCP- gastric antral ulcers  PPI BID started- needed until 5/22/2019      Ascending cholangitis  Seen on ERCP 3/21  No signs of sepsis  Started cipro IV on 3/21- continue x7 days (last day 3/27)      Class 2 obesity with body mass index (BMI) of 37.0 to 37.9 in adult  Body mass index is 37.78 kg/m².  Encourage weight loss      Hyperlipidemia    HDL 69   Dietary changes and follow up as outpatient       Essential hypertension  Continue home valsartan        VTE Risk Mitigation (From admission, onward)        Ordered     Place sequential compression device  Until discontinued      03/25/19 1140     IP VTE HIGH RISK PATIENT  Once      03/21/19 0521     Reason for No Pharmacological VTE Prophylaxis  Once      03/21/19 0521              Erin Riddle MD  Department of Hospital Medicine   [facility]

## 2019-03-25 NOTE — SUBJECTIVE & OBJECTIVE
Interval History:  No complaints. NPO for lap armando today     Review of Systems   HENT: Negative for congestion.    Respiratory: Negative for cough, chest tightness and shortness of breath.    Cardiovascular: Negative for chest pain.   Gastrointestinal: Negative for abdominal distention, abdominal pain, constipation, diarrhea, nausea and vomiting.   Genitourinary: Negative for difficulty urinating.     Objective:     Vital Signs (Most Recent):  Temp: 97.5 °F (36.4 °C) (03/25/19 1205)  Pulse: 88 (03/25/19 1255)  Resp: 19 (03/25/19 1255)  BP: (!) 170/77 (03/25/19 1250)  SpO2: 98 % (03/25/19 1255) Vital Signs (24h Range):  Temp:  [97.5 °F (36.4 °C)-99.3 °F (37.4 °C)] 97.5 °F (36.4 °C)  Pulse:  [52-90] 88  Resp:  [16-30] 19  SpO2:  [95 %-100 %] 98 %  BP: (112-188)/(65-96) 170/77     Weight: 90.7 kg (199 lb 15.3 oz)  Body mass index is 37.78 kg/m².    Intake/Output Summary (Last 24 hours) at 3/25/2019 1307  Last data filed at 3/25/2019 1208  Gross per 24 hour   Intake 1940 ml   Output --   Net 1940 ml      Physical Exam   Constitutional: She appears well-developed and well-nourished. No distress.   obese   HENT:   Head: Normocephalic and atraumatic.   Nose: Nose normal.   Mouth/Throat: Oropharynx is clear and moist.   Cardiovascular: Normal rate, regular rhythm and normal heart sounds. Exam reveals no gallop and no friction rub.   No murmur heard.  Pulmonary/Chest: Effort normal and breath sounds normal. No stridor. No respiratory distress. She has no wheezes. She has no rales.   On room air   Abdominal: Soft. Bowel sounds are normal. She exhibits no distension and no mass. There is no tenderness. There is no rebound and no guarding.   Musculoskeletal: She exhibits no edema.   Neurological: She is alert.   Skin: Skin is warm and dry. No rash noted. She is not diaphoretic. No erythema.   Nursing note and vitals reviewed.      Significant Labs: All pertinent labs within the past 24 hours have been reviewed.    Significant  Imaging: I have reviewed and interpreted all pertinent imaging results/findings within the past 24 hours.

## 2019-03-25 NOTE — PROGRESS NOTES
"gen surg   still some central abd pain, much better than admit, moustapha po yest   t krystal nl  Lipase 88  Blood pressure (!) 142/87, pulse 80, temperature 98.2 °F (36.8 °C), temperature source Oral, resp. rate 18, height 5' 1" (1.549 m), weight 90.7 kg (199 lb 15.3 oz), last menstrual period 03/07/2019, SpO2 98 %, not currently breastfeeding.  nonicteric sclera  abd obese, soft, nl bs, nd, no murphys, mild central upper abd tenderness  A/p resolving bilairy pancreatitis, s/p ercp sphincterotomy for cbd stone- improving-cont abx,cholecystectomy today, situation and procedure d/w pt at length, questions answred, she wishes to proceed  "

## 2019-03-25 NOTE — NURSING
Pt arrived to unit from procedure aaox4, c/o pain to abdomen. Percocet given, pt able to stand to use bedside commode, urine out 350ml of clear yellow urine. AURELIA drain to right abdomen draining serosanguinous fluid. 3 island dressings to abdomen C/D/I. Will continue to monitor.

## 2019-03-25 NOTE — TRANSFER OF CARE
"Anesthesia Transfer of Care Note    Patient: Dea Ravi    Procedure(s) Performed: Procedure(s) (LRB):  CHOLECYSTECTOMY, LAPAROSCOPIC (N/A)    Patient location: PACU    Anesthesia Type: general    Transport from OR: Transported from OR on room air with adequate spontaneous ventilation    Post pain: adequate analgesia    Post assessment: no apparent anesthetic complications and tolerated procedure well    Post vital signs: stable    Level of consciousness: awake, alert and oriented    Nausea/Vomiting: no nausea/vomiting    Complications: none    Transfer of care protocol was followed      Last vitals:   Visit Vitals  BP (!) 188/96   Pulse 84   Temp 36.4 °C (97.5 °F) (Oral)   Resp 16   Ht 5' 1" (1.549 m)   Wt 90.7 kg (199 lb 15.3 oz)   LMP 03/07/2019 (Approximate)   SpO2 99%   Breastfeeding? No   BMI 37.78 kg/m²     "

## 2019-03-25 NOTE — ASSESSMENT & PLAN NOTE
Has characteristic pain, lipase >1000, and changes consistent with pancreatitis of biliary origin on CT  No alcohol use  Lipids elevated but no hypertriglyceridemia ()  Started on IVF and pain control- decrease opioids  GI consulted for ERCP on 3/21- dilation of ducts, stones removed, cholangitis present. Started cipro on 3/21 for cholangitis  Gen Surg consulted for GB removal- will be done when pancreatitis resolves  T bili now decreasing.  Lipase decreasing.   Tolerating diet. Pain resolved  S/p lap cholecystectomy on 3/25

## 2019-03-25 NOTE — OP NOTE
gen surg op note  Pre op dx resolving biliary pancreatitis  Post op dx same  Proc lap armando  anesth gen  ebl 100cc  Findings dictated  Specimen rozina hill [id number]

## 2019-03-25 NOTE — OP NOTE
DATE OF PROCEDURE:  03/25/2019.    PREOPERATIVE DIAGNOSIS:  Resolving biliary pancreatitis.    POSTOPERATIVE DIAGNOSIS:  Resolving biliary pancreatitis.    PROCEDURE:  Laparoscopic cholecystectomy.    SURGEON:  Nish Alex III, M.D.    ANESTHESIA:  General.    BLOOD LOSS:  100 mL    CLINICAL HISTORY:  A 53-year-old female admitted with biliary pancreatitis.  She   had a distal common bile duct stone removed with ERCP and a sphincterotomy.    Her bilirubin level was normal.  Her pancreatitis was clinically resolved and   she is consented for cholecystectomy.    PROCEDURE IN DETAIL:  The patient was brought into the Operating Room and placed   on the operating table in the supine position.  The abdomen was prepped and   draped in sterile fashion.  A 2 cm incision was made below the umbilicus in the   midline.  Electrocautery was used to dissect through the subcutaneous tissue   down to the fascia, which was grasped with Kocher clamps.  It was incised for   about 1.5 cm.  The peritoneal cavity was entered.  There were no surrounding   adhesions.  A 2-0 Vicryl stay sutures were placed.  The Connie trocar was   introduced.  Pneumoperitoneum was instituted.  Laparoscope was inserted.  There   was no damage to the underlying bowel.  There is no pathology in the pelvis.    The liver appeared healthy.  Under direct visualization, an 11 mm trocar was   placed subxiphoid and two 5-mm trocars in the right upper quadrant.  The upper   part of it appeared normal, but there was significant chronic inflammation   involving the gallbladder and the triangle of Calot.  There were omental   adhesions to the gallbladder.  These were taken down with cautery and blunt   dissection with no damage to any of the surrounding bowel.  The gallbladder was   hard, distended with stones, chronically inflamed and difficult to grasp.  There   was significant inflammation in and around the triangle of Calot.  The cystic   duct appeared  chronically dilated.  An attempt was made to perform the critical   view technique to identify the cystic duct and artery, but there was significant   inflammation.  Dissection was then begun more distally closer to the liver.  A   window was created and this dissection was carried up taking the gallbladder   down completely.  Next, following this, the cystic artery was identified as well   as the cystic duct.  They were skeletonized and was significantly dilated and   would not fit the clip.  The artery was clipped twice proximally, once distally   and was transected.  After doing such, there was some oozing of blood from the   gallbladder fossa in this area.  A Ray-Bill was placed within the abdomen.    Pressure was held here for 5 minutes.  The bleeding completely stopped.  The   Ray-Bill was eventually placed and removed by me within the Endobag with the   gallbladder.  Next, the 11 mm trocar was changed to a 12 and an Endo-DREAD   stapling device with a blue load was used to transect the infundibulum/cystic   duct, it was fired, then remove, the staple line was intact.  There was no   bleeding or leakage of bowel.  This area was observed for several minutes.    There was no further bleeding.  The gallbladder was placed within the Endobag as   well as the Ray-Bill and eventually removed from the subumbilical trocar site.    The right upper quadrant was copiously irrigated with normal saline.  The return   fluid was clear.  Israel drain was placed in the gallbladder fossa and brought   out through the most lateral trocar site.  The trocars were removed.    Pneumoperitoneum was released.  Paraumbilical fascia defect was closed with the   previously placed Vicryl sutures.  The drain was secured in place with 0 silk   suture.  Staples used to close the skin.  A 10 mL of 0.25% Marcaine with   epinephrine were injected for local anesthesia.  Sterile dressings were applied.    The patient tolerated the procedure  well.      RDC/ABY  dd: 03/25/2019 12:05:36 (CDT)  td: 03/25/2019 12:49:02 (CDT)  Doc ID   #[id number]  Job ID #[id number]    CC:

## 2019-03-26 PROBLEM — E87.6 HYPOKALEMIA: Status: ACTIVE | Noted: 2019-03-26

## 2019-03-26 LAB
ALBUMIN SERPL BCP-MCNC: 2.6 G/DL (ref 3.5–5.2)
ALP SERPL-CCNC: 144 U/L (ref 55–135)
ALT SERPL W/O P-5'-P-CCNC: 105 U/L (ref 10–44)
ANION GAP SERPL CALC-SCNC: 8 MMOL/L (ref 8–16)
AST SERPL-CCNC: 37 U/L (ref 10–40)
BILIRUB SERPL-MCNC: 0.8 MG/DL (ref 0.1–1)
BUN SERPL-MCNC: 3 MG/DL (ref 6–20)
CALCIUM SERPL-MCNC: 8.6 MG/DL (ref 8.7–10.5)
CHLORIDE SERPL-SCNC: 104 MMOL/L (ref 95–110)
CO2 SERPL-SCNC: 23 MMOL/L (ref 23–29)
CREAT SERPL-MCNC: 0.6 MG/DL (ref 0.5–1.4)
EST. GFR  (AFRICAN AMERICAN): >60 ML/MIN/1.73 M^2
EST. GFR  (NON AFRICAN AMERICAN): >60 ML/MIN/1.73 M^2
GLUCOSE SERPL-MCNC: 93 MG/DL (ref 70–110)
POTASSIUM SERPL-SCNC: 3.1 MMOL/L (ref 3.5–5.1)
PROT SERPL-MCNC: 6.1 G/DL (ref 6–8.4)
SODIUM SERPL-SCNC: 135 MMOL/L (ref 136–145)

## 2019-03-26 PROCEDURE — 25000003 PHARM REV CODE 250: Performed by: SURGERY

## 2019-03-26 PROCEDURE — S0030 INJECTION, METRONIDAZOLE: HCPCS | Performed by: SURGERY

## 2019-03-26 PROCEDURE — 63600175 PHARM REV CODE 636 W HCPCS: Performed by: SURGERY

## 2019-03-26 PROCEDURE — 99900035 HC TECH TIME PER 15 MIN (STAT)

## 2019-03-26 PROCEDURE — 80053 COMPREHEN METABOLIC PANEL: CPT

## 2019-03-26 PROCEDURE — 11000001 HC ACUTE MED/SURG PRIVATE ROOM

## 2019-03-26 PROCEDURE — 36415 COLL VENOUS BLD VENIPUNCTURE: CPT

## 2019-03-26 PROCEDURE — 94799 UNLISTED PULMONARY SVC/PX: CPT

## 2019-03-26 PROCEDURE — 25000003 PHARM REV CODE 250: Performed by: HOSPITALIST

## 2019-03-26 PROCEDURE — 94761 N-INVAS EAR/PLS OXIMETRY MLT: CPT

## 2019-03-26 RX ORDER — POTASSIUM CHLORIDE 20 MEQ/1
40 TABLET, EXTENDED RELEASE ORAL ONCE
Status: COMPLETED | OUTPATIENT
Start: 2019-03-26 | End: 2019-03-26

## 2019-03-26 RX ORDER — HYDROMORPHONE HYDROCHLORIDE 2 MG/ML
1.5 INJECTION, SOLUTION INTRAMUSCULAR; INTRAVENOUS; SUBCUTANEOUS
Status: DISCONTINUED | OUTPATIENT
Start: 2019-03-26 | End: 2019-03-27

## 2019-03-26 RX ADMIN — ONDANSETRON 4 MG: 2 INJECTION INTRAMUSCULAR; INTRAVENOUS at 08:03

## 2019-03-26 RX ADMIN — METRONIDAZOLE 500 MG: 500 INJECTION, SOLUTION INTRAVENOUS at 04:03

## 2019-03-26 RX ADMIN — PANTOPRAZOLE SODIUM 40 MG: 40 TABLET, DELAYED RELEASE ORAL at 08:03

## 2019-03-26 RX ADMIN — SODIUM CHLORIDE: 0.9 INJECTION, SOLUTION INTRAVENOUS at 01:03

## 2019-03-26 RX ADMIN — CIPROFLOXACIN 400 MG: 2 INJECTION, SOLUTION INTRAVENOUS at 04:03

## 2019-03-26 RX ADMIN — CHLORZOXAZONE 500 MG: 500 TABLET ORAL at 09:03

## 2019-03-26 RX ADMIN — CHLORZOXAZONE 500 MG: 500 TABLET ORAL at 03:03

## 2019-03-26 RX ADMIN — HYDROMORPHONE HYDROCHLORIDE 1 MG: 2 INJECTION, SOLUTION INTRAMUSCULAR; INTRAVENOUS; SUBCUTANEOUS at 04:03

## 2019-03-26 RX ADMIN — PANTOPRAZOLE SODIUM 40 MG: 40 TABLET, DELAYED RELEASE ORAL at 09:03

## 2019-03-26 RX ADMIN — POLYETHYLENE GLYCOL 3350 17 G: 17 POWDER, FOR SOLUTION ORAL at 09:03

## 2019-03-26 RX ADMIN — HYDROMORPHONE HYDROCHLORIDE 1.5 MG: 2 INJECTION, SOLUTION INTRAMUSCULAR; INTRAVENOUS; SUBCUTANEOUS at 11:03

## 2019-03-26 RX ADMIN — HYDROMORPHONE HYDROCHLORIDE 1.5 MG: 2 INJECTION, SOLUTION INTRAMUSCULAR; INTRAVENOUS; SUBCUTANEOUS at 07:03

## 2019-03-26 RX ADMIN — POTASSIUM CHLORIDE 40 MEQ: 1500 TABLET, EXTENDED RELEASE ORAL at 01:03

## 2019-03-26 RX ADMIN — VALSARTAN 160 MG: 80 TABLET, FILM COATED ORAL at 08:03

## 2019-03-26 RX ADMIN — DOCUSATE SODIUM AND SENNOSIDES 2 TABLET: 8.6; 5 TABLET, FILM COATED ORAL at 09:03

## 2019-03-26 RX ADMIN — HYDROMORPHONE HYDROCHLORIDE 1.5 MG: 2 INJECTION, SOLUTION INTRAMUSCULAR; INTRAVENOUS; SUBCUTANEOUS at 04:03

## 2019-03-26 RX ADMIN — HYDROMORPHONE HYDROCHLORIDE 1.5 MG: 2 INJECTION, SOLUTION INTRAMUSCULAR; INTRAVENOUS; SUBCUTANEOUS at 01:03

## 2019-03-26 RX ADMIN — HYDROMORPHONE HYDROCHLORIDE 1.5 MG: 2 INJECTION, SOLUTION INTRAMUSCULAR; INTRAVENOUS; SUBCUTANEOUS at 08:03

## 2019-03-26 RX ADMIN — DIPHENHYDRAMINE HYDROCHLORIDE 25 MG: 50 INJECTION, SOLUTION INTRAMUSCULAR; INTRAVENOUS at 04:03

## 2019-03-26 RX ADMIN — DOCUSATE SODIUM AND SENNOSIDES 2 TABLET: 8.6; 5 TABLET, FILM COATED ORAL at 08:03

## 2019-03-26 RX ADMIN — METRONIDAZOLE 500 MG: 500 INJECTION, SOLUTION INTRAVENOUS at 09:03

## 2019-03-26 RX ADMIN — HYDROMORPHONE HYDROCHLORIDE 1 MG: 2 INJECTION, SOLUTION INTRAMUSCULAR; INTRAVENOUS; SUBCUTANEOUS at 01:03

## 2019-03-26 RX ADMIN — VALSARTAN 160 MG: 80 TABLET, FILM COATED ORAL at 09:03

## 2019-03-26 RX ADMIN — HYDROMORPHONE HYDROCHLORIDE 1.5 MG: 2 INJECTION, SOLUTION INTRAMUSCULAR; INTRAVENOUS; SUBCUTANEOUS at 10:03

## 2019-03-26 RX ADMIN — DIPHENHYDRAMINE HYDROCHLORIDE 25 MG: 50 INJECTION, SOLUTION INTRAMUSCULAR; INTRAVENOUS at 07:03

## 2019-03-26 RX ADMIN — METRONIDAZOLE 500 MG: 500 INJECTION, SOLUTION INTRAVENOUS at 12:03

## 2019-03-26 RX ADMIN — GUAIFENESIN AND DEXTROMETHORPHAN HYDROBROMIDE 1 TABLET: 600; 30 TABLET, EXTENDED RELEASE ORAL at 09:03

## 2019-03-26 RX ADMIN — ENOXAPARIN SODIUM 40 MG: 100 INJECTION SUBCUTANEOUS at 04:03

## 2019-03-26 RX ADMIN — DIPHENHYDRAMINE HYDROCHLORIDE 25 MG: 50 INJECTION, SOLUTION INTRAMUSCULAR; INTRAVENOUS at 03:03

## 2019-03-26 RX ADMIN — METRONIDAZOLE 500 MG: 500 INJECTION, SOLUTION INTRAVENOUS at 11:03

## 2019-03-26 RX ADMIN — GUAIFENESIN AND DEXTROMETHORPHAN HYDROBROMIDE 1 TABLET: 600; 30 TABLET, EXTENDED RELEASE ORAL at 08:03

## 2019-03-26 NOTE — PROGRESS NOTES
[facility] Hospital Medicine  Progress Note    Patient Name: Dea Ravi  MRN: [id number]  Patient Class: IP- Inpatient   Admission Date: 3/21/2019  Length of Stay: 5 days  Attending Physician: Erin Riddle MD  Primary Care Provider: Primary Doctor No        Subjective:     Principal Problem:Acute gallstone pancreatitis    HPI:  Ms Dea Ravi is a 53 y.o. woman with HTN and obesity who presents for abdominal pain. She developed 9.5/10 crampy then sharp abdominal pain located across the upper part of her abdomen with no radiation after eating cereal last night. Nothing makes it better (other than pain meds) or worse. Associated with nausea and vomiting. No hematemesis. No fevers or chills. No change in BMs or urination. She has had intermittent crampy pain after eating cereal, corn, nuts (not fatty foods) for months. She has been told that she had gallbladder problems in the past. No alcohol. Takes valsartan only at home; no statin.     In ED, found to have biliary pancreatitis. Given IVF and pain control.     Hospital Course:  Admitted with biliary pancreatitis. Started on IVF and pain control. GI consulted. Patient went for ERCP on 3/21. ERCP showed clean based ulcers/erosions in stomach antrum, dilation of bile duct and common hepatic duct due to obstructing stone, choledocholithiasis, and pus consistent with cholangitis. Stones were removed. She was started on cipro IV BID for cholangitis and pantoprazole BID for ulcers. Surgery consulted for cholecystectomy.  T bili francisco after ERCP as expected from duct swelling; now decreasing.  Patient tolerating a diet. Pain is resolved. Went for lap cholecystectomy on 3/25-- severe chronic cholecystitis; added flagyl to cipro.     Interval History:  Feels a spasming punching sensation in her epigastrium/RUQ that is sudden onset and better with pressure. No nausea or vomiting. No gas and no BMs.     Review of Systems   HENT: Negative for  congestion.    Respiratory: Negative for cough, chest tightness and shortness of breath.    Cardiovascular: Negative for chest pain.   Gastrointestinal: Positive for abdominal pain and constipation. Negative for abdominal distention, diarrhea, nausea and vomiting.   Genitourinary: Negative for difficulty urinating.     Objective:     Vital Signs (Most Recent):  Temp: 98.8 °F (37.1 °C) (03/26/19 0719)  Pulse: 88 (03/26/19 0719)  Resp: 18 (03/26/19 0719)  BP: (!) 144/82 (03/26/19 0719)  SpO2: 95 % (03/26/19 0719) Vital Signs (24h Range):  Temp:  [97.4 °F (36.3 °C)-99.1 °F (37.3 °C)] 98.8 °F (37.1 °C)  Pulse:  [52-88] 88  Resp:  [16-30] 18  SpO2:  [93 %-100 %] 95 %  BP: (123-188)/(67-96) 144/82     Weight: 90.7 kg (199 lb 15.3 oz)  Body mass index is 37.78 kg/m².    Intake/Output Summary (Last 24 hours) at 3/26/2019 1021  Last data filed at 3/26/2019 0700  Gross per 24 hour   Intake 3247.08 ml   Output 1625 ml   Net 1622.08 ml      Physical Exam   Constitutional: She appears well-developed and well-nourished. No distress.   obese   HENT:   Head: Normocephalic and atraumatic.   Nose: Nose normal.   Mouth/Throat: Oropharynx is clear and moist.   Cardiovascular: Normal rate, regular rhythm and normal heart sounds. Exam reveals no gallop and no friction rub.   No murmur heard.  Pulmonary/Chest: Effort normal and breath sounds normal. No stridor. No respiratory distress. She has no wheezes. She has no rales.   On room air   Abdominal: Soft. Bowel sounds are normal. She exhibits no distension and no mass. There is no tenderness. There is no rebound and no guarding.   Surgical bandages intact   Musculoskeletal: She exhibits no edema.   Neurological: She is alert.   Skin: Skin is warm and dry. No rash noted. She is not diaphoretic. No erythema.   Nursing note and vitals reviewed.      Significant Labs: All pertinent labs within the past 24 hours have been reviewed.    Significant Imaging: I have reviewed and interpreted all  pertinent imaging results/findings within the past 24 hours.    Assessment/Plan:      * Acute gallstone pancreatitis  Has characteristic pain, lipase >1000, and changes consistent with pancreatitis of biliary origin on CT  No alcohol use  Lipids elevated but no hypertriglyceridemia ()  Started on IVF and pain control- decrease opioids  GI consulted for ERCP on 3/21- dilation of ducts, stones removed, cholangitis present. Started cipro on 3/21 for cholangitis  Gen Surg consulted for GB removal- will be done when pancreatitis resolves  T bili now decreasing.  Lipase decreasing.   Tolerating diet. Pain resolved  S/p lap cholecystectomy on 3/25-- severe chronic cholecystitis with many gallstones; added flagyl  Advance diet as tolerated. CMP pending this AM.       Acute gastric ulcer without hemorrhage or perforation  As seen during ERCP- gastric antral ulcers  PPI BID started- needed until 5/22/2019      Ascending cholangitis  Seen on ERCP 3/21  No signs of sepsis  Started cipro IV on 3/21- continue x7 days (last day 3/27)      Class 2 obesity with body mass index (BMI) of 37.0 to 37.9 in adult  Body mass index is 37.78 kg/m².  Encourage weight loss      Hyperlipidemia    HDL 69   Dietary changes and follow up as outpatient       Essential hypertension  Continue home valsartan        VTE Risk Mitigation (From admission, onward)        Ordered     enoxaparin injection 40 mg  Daily      03/25/19 1315     Place sequential compression device  Until discontinued      03/25/19 1315     IP VTE HIGH RISK PATIENT  Once      03/21/19 0521              Erin Riddle MD  Department of Hospital Medicine   [facility]

## 2019-03-26 NOTE — NURSING
Patient remains free from falls and injury. No distress noted. VSS. No complaint of pain, n/v, diarrhea, or SOB. Will continue to monitor, will continue with plan of care.

## 2019-03-26 NOTE — SUBJECTIVE & OBJECTIVE
Interval History:  Feels a spasming punching sensation in her epigastrium/RUQ that is sudden onset and better with pressure. No nausea or vomiting. No gas and no BMs.     Review of Systems   HENT: Negative for congestion.    Respiratory: Negative for cough, chest tightness and shortness of breath.    Cardiovascular: Negative for chest pain.   Gastrointestinal: Positive for abdominal pain and constipation. Negative for abdominal distention, diarrhea, nausea and vomiting.   Genitourinary: Negative for difficulty urinating.     Objective:     Vital Signs (Most Recent):  Temp: 98.8 °F (37.1 °C) (03/26/19 0719)  Pulse: 88 (03/26/19 0719)  Resp: 18 (03/26/19 0719)  BP: (!) 144/82 (03/26/19 0719)  SpO2: 95 % (03/26/19 0719) Vital Signs (24h Range):  Temp:  [97.4 °F (36.3 °C)-99.1 °F (37.3 °C)] 98.8 °F (37.1 °C)  Pulse:  [52-88] 88  Resp:  [16-30] 18  SpO2:  [93 %-100 %] 95 %  BP: (123-188)/(67-96) 144/82     Weight: 90.7 kg (199 lb 15.3 oz)  Body mass index is 37.78 kg/m².    Intake/Output Summary (Last 24 hours) at 3/26/2019 1021  Last data filed at 3/26/2019 0700  Gross per 24 hour   Intake 3247.08 ml   Output 1625 ml   Net 1622.08 ml      Physical Exam   Constitutional: She appears well-developed and well-nourished. No distress.   obese   HENT:   Head: Normocephalic and atraumatic.   Nose: Nose normal.   Mouth/Throat: Oropharynx is clear and moist.   Cardiovascular: Normal rate, regular rhythm and normal heart sounds. Exam reveals no gallop and no friction rub.   No murmur heard.  Pulmonary/Chest: Effort normal and breath sounds normal. No stridor. No respiratory distress. She has no wheezes. She has no rales.   On room air   Abdominal: Soft. Bowel sounds are normal. She exhibits no distension and no mass. There is no tenderness. There is no rebound and no guarding.   Surgical bandages intact   Musculoskeletal: She exhibits no edema.   Neurological: She is alert.   Skin: Skin is warm and dry. No rash noted. She is not  diaphoretic. No erythema.   Nursing note and vitals reviewed.      Significant Labs: All pertinent labs within the past 24 hours have been reviewed.    Significant Imaging: I have reviewed and interpreted all pertinent imaging results/findings within the past 24 hours.

## 2019-03-26 NOTE — PROGRESS NOTES
"gen surg pod 1  Op findings d/w pt; pt oob in chair, pre op adb pain resolved, now w inc pain, moustapha some clears but appetite not back to normal  Blood pressure 123/68, pulse 88, temperature 98.7 °F (37.1 °C), temperature source Oral, resp. rate 18, height 5' 1" (1.549 m), weight 90.7 kg (199 lb 15.3 oz), last menstrual period 03/07/2019, SpO2 (!) 93 %, not currently breastfeeding.  abd soft, bs present, nd, dressings dry, appropriate inc tenderness  Drain w ss dc-no bile, pus or enteric contents  nonicteric sclera  A/p s/p lap armando for krystal panc-intra op findings severe chronic cholecystitis- cont abx, not ready to adv diet this am, oob, IS, may be able to remove drain before dc ( possibly tomorrow if appetite improved) if current trends continue  "

## 2019-03-26 NOTE — PLAN OF CARE
03/26/19 1720   Discharge Reassessment   Assessment Type Discharge Planning Reassessment   Provided patient/caregiver education on the expected discharge date and the discharge plan No   Do you have any problems affording any of your prescribed medications? No   Discharge Plan A Home with family   DME Needed Upon Discharge  none   Patient choice form signed by patient/caregiver No   Anticipated Discharge Disposition Home   Can the patient answer the patient profile reliably? Yes, cognitively intact   How does the patient rate their overall health at the present time? Fair   Describe the patient's ability to walk at the present time. Minor restrictions or changes   How often would a person be available to care for the patient? Whenever needed   Number of comorbid conditions (as recorded on the chart) Three   During the past month, has the patient often been bothered by feeling down, depressed or hopeless? No   During the past month, has the patient often been bothered by little interest or pleasure in doing things? No

## 2019-03-26 NOTE — NURSING
Received report from off going nurse Sil. Patient received lying in bed with HOB elevated. Resp even and unlabored on room air. Family member at bedside. Patient refuses bed alarm and SCDs stating that she gets up too much to go to the bathroom. Spouse at the bedside. 3 island dressing with old tenorio drainage noted to the patient's abd. AURELIA drain noted to the R abd. RH 20 g noted with NS infusing at 125ml/h. Patient c/o back spasms and requested her spasm medicine. Message sent to the pharmacy for the medication.

## 2019-03-26 NOTE — ASSESSMENT & PLAN NOTE
Has characteristic pain, lipase >1000, and changes consistent with pancreatitis of biliary origin on CT  No alcohol use  Lipids elevated but no hypertriglyceridemia ()  Started on IVF and pain control- decrease opioids  GI consulted for ERCP on 3/21- dilation of ducts, stones removed, cholangitis present. Started cipro on 3/21 for cholangitis  Gen Surg consulted for GB removal- will be done when pancreatitis resolves  T bili now decreasing.  Lipase decreasing.   Tolerating diet. Pain resolved  S/p lap cholecystectomy on 3/25-- severe chronic cholecystitis with many gallstones; added flagyl  Advance diet as tolerated. CMP pending this AM.

## 2019-03-27 VITALS
DIASTOLIC BLOOD PRESSURE: 80 MMHG | HEIGHT: 61 IN | OXYGEN SATURATION: 99 % | WEIGHT: 199.94 LBS | SYSTOLIC BLOOD PRESSURE: 135 MMHG | BODY MASS INDEX: 37.75 KG/M2 | RESPIRATION RATE: 18 BRPM | HEART RATE: 79 BPM | TEMPERATURE: 98 F

## 2019-03-27 PROCEDURE — 99900035 HC TECH TIME PER 15 MIN (STAT)

## 2019-03-27 PROCEDURE — 94761 N-INVAS EAR/PLS OXIMETRY MLT: CPT

## 2019-03-27 PROCEDURE — 63600175 PHARM REV CODE 636 W HCPCS: Performed by: SURGERY

## 2019-03-27 PROCEDURE — 25000003 PHARM REV CODE 250: Performed by: SURGERY

## 2019-03-27 PROCEDURE — 94799 UNLISTED PULMONARY SVC/PX: CPT

## 2019-03-27 PROCEDURE — S0030 INJECTION, METRONIDAZOLE: HCPCS | Performed by: SURGERY

## 2019-03-27 PROCEDURE — 25000003 PHARM REV CODE 250: Performed by: HOSPITALIST

## 2019-03-27 RX ORDER — CIPROFLOXACIN 500 MG/1
500 TABLET ORAL EVERY 12 HOURS
Qty: 10 TABLET | Refills: 0 | Status: SHIPPED | OUTPATIENT
Start: 2019-03-27 | End: 2019-04-01

## 2019-03-27 RX ORDER — OXYCODONE AND ACETAMINOPHEN 10; 325 MG/1; MG/1
1 TABLET ORAL EVERY 4 HOURS PRN
Qty: 15 TABLET | Refills: 0 | OUTPATIENT
Start: 2019-03-27 | End: 2022-04-03

## 2019-03-27 RX ORDER — METRONIDAZOLE 500 MG/1
500 TABLET ORAL EVERY 8 HOURS
Qty: 15 TABLET | Refills: 0 | Status: SHIPPED | OUTPATIENT
Start: 2019-03-27 | End: 2019-04-01

## 2019-03-27 RX ORDER — PANTOPRAZOLE SODIUM 40 MG/1
40 TABLET, DELAYED RELEASE ORAL 2 TIMES DAILY
Qty: 60 TABLET | Refills: 1 | Status: SHIPPED | OUTPATIENT
Start: 2019-03-27 | End: 2022-04-02 | Stop reason: SDUPTHER

## 2019-03-27 RX ADMIN — CIPROFLOXACIN 400 MG: 2 INJECTION, SOLUTION INTRAVENOUS at 04:03

## 2019-03-27 RX ADMIN — OXYCODONE HYDROCHLORIDE AND ACETAMINOPHEN 1 TABLET: 10; 325 TABLET ORAL at 09:03

## 2019-03-27 RX ADMIN — GUAIFENESIN AND DEXTROMETHORPHAN HYDROBROMIDE 1 TABLET: 600; 30 TABLET, EXTENDED RELEASE ORAL at 09:03

## 2019-03-27 RX ADMIN — VALSARTAN 160 MG: 80 TABLET, FILM COATED ORAL at 09:03

## 2019-03-27 RX ADMIN — OXYCODONE HYDROCHLORIDE AND ACETAMINOPHEN 1 TABLET: 10; 325 TABLET ORAL at 04:03

## 2019-03-27 RX ADMIN — POLYETHYLENE GLYCOL 3350 17 G: 17 POWDER, FOR SOLUTION ORAL at 09:03

## 2019-03-27 RX ADMIN — CHLORZOXAZONE 500 MG: 500 TABLET ORAL at 04:03

## 2019-03-27 RX ADMIN — PANTOPRAZOLE SODIUM 40 MG: 40 TABLET, DELAYED RELEASE ORAL at 09:03

## 2019-03-27 RX ADMIN — DOCUSATE SODIUM AND SENNOSIDES 2 TABLET: 8.6; 5 TABLET, FILM COATED ORAL at 09:03

## 2019-03-27 RX ADMIN — SODIUM CHLORIDE: 0.9 INJECTION, SOLUTION INTRAVENOUS at 04:03

## 2019-03-27 RX ADMIN — METRONIDAZOLE 500 MG: 500 INJECTION, SOLUTION INTRAVENOUS at 09:03

## 2019-03-27 NOTE — PLAN OF CARE
Problem: Pain Acute  Goal: Optimal Pain Control  Outcome: Ongoing (interventions implemented as appropriate)  Intervention: Develop Pain Management Plan     03/27/19 0201   Prevent or Manage Pain   Pain Management Interventions care clustered;diversional activity provided;pain management plan reviewed with patient/caregiver;quiet environment facilitated;relaxation techniques promoted     Intervention: Prevent or Manage Pain     03/27/19 0201   Prevent or Manage Pain   Sensory Stimulation Regulation auditory stimulation minimized;care clustered;lighting decreased;quiet environment promoted;visual stimulation minimized   Sleep/Rest Enhancement awakenings minimized;noise level reduced     Intervention: Optimize Psychosocial Wellbeing     03/27/19 0201   Prevent and Minimize Fear   Diversional Activities smartphone;television   Promote Anxiety Reduction   Supportive Measures positive reinforcement provided;self-care encouraged;verbalization of feelings encouraged

## 2019-03-27 NOTE — PROGRESS NOTES
[facility] Hospital Medicine  Progress Note    Patient Name: Dea Ravi  MRN: [id number]  Patient Class: IP- Inpatient   Admission Date: 3/21/2019  Length of Stay: 6 days  Attending Physician: Erin Riddle MD  Primary Care Provider: Primary Doctor No        Subjective:     Principal Problem:Acute gallstone pancreatitis    HPI:  Ms Dea Ravi is a 53 y.o. woman with HTN and obesity who presents for abdominal pain. She developed 9.5/10 crampy then sharp abdominal pain located across the upper part of her abdomen with no radiation after eating cereal last night. Nothing makes it better (other than pain meds) or worse. Associated with nausea and vomiting. No hematemesis. No fevers or chills. No change in BMs or urination. She has had intermittent crampy pain after eating cereal, corn, nuts (not fatty foods) for months. She has been told that she had gallbladder problems in the past. No alcohol. Takes valsartan only at home; no statin.     In ED, found to have biliary pancreatitis. Given IVF and pain control.     Hospital Course:  Admitted with biliary pancreatitis. Started on IVF and pain control. GI consulted. Patient went for ERCP on 3/21. ERCP showed clean based ulcers/erosions in stomach antrum, dilation of bile duct and common hepatic duct due to obstructing stone, choledocholithiasis, and pus consistent with cholangitis. Stones were removed. She was started on cipro IV BID for cholangitis and pantoprazole BID for ulcers. Surgery consulted for cholecystectomy.  T bili francisco after ERCP as expected from duct swelling; now decreasing.  Patient tolerating a diet. Pain is resolved. Went for lap cholecystectomy on 3/25-- severe chronic cholecystitis; added flagyl to cipro. Will plan to continue both cipro and flagyl for 7 days total. Will continue pantoprazole 40mg BID for gastric ulcers for 2 months per GI recommendations. Drain still in place-- waiting for surgery to remove.      Interval History:  Wants to go home. No BM. Occasional abdominal pain. Not requiring IV pain meds.     Review of Systems   HENT: Negative for congestion.    Respiratory: Negative for cough, chest tightness and shortness of breath.    Cardiovascular: Negative for chest pain.   Gastrointestinal: Positive for abdominal pain and constipation. Negative for abdominal distention, diarrhea, nausea and vomiting.   Genitourinary: Negative for difficulty urinating.     Objective:     Vital Signs (Most Recent):  Temp: 98.1 °F (36.7 °C) (03/27/19 0721)  Pulse: 79 (03/27/19 0721)  Resp: 18 (03/27/19 0721)  BP: 135/80 (03/27/19 0721)  SpO2: 99 % (03/27/19 0829) Vital Signs (24h Range):  Temp:  [98.1 °F (36.7 °C)-99.2 °F (37.3 °C)] 98.1 °F (36.7 °C)  Pulse:  [79-92] 79  Resp:  [17-18] 18  SpO2:  [93 %-99 %] 99 %  BP: (135-140)/(75-85) 135/80     Weight: 90.7 kg (199 lb 15.3 oz)  Body mass index is 37.78 kg/m².    Intake/Output Summary (Last 24 hours) at 3/27/2019 1006  Last data filed at 3/27/2019 0822  Gross per 24 hour   Intake 4950.42 ml   Output 2880 ml   Net 2070.42 ml      Physical Exam   Constitutional: She appears well-developed and well-nourished. No distress.   obese   HENT:   Head: Normocephalic and atraumatic.   Nose: Nose normal.   Mouth/Throat: Oropharynx is clear and moist.   Cardiovascular: Normal rate, regular rhythm and normal heart sounds. Exam reveals no gallop and no friction rub.   No murmur heard.  Pulmonary/Chest: Effort normal and breath sounds normal. No stridor. No respiratory distress. She has no wheezes. She has no rales.   On room air   Abdominal: Soft. Bowel sounds are normal. She exhibits no distension and no mass. There is no tenderness. There is no rebound and no guarding.   Surgical bandages intact   Musculoskeletal: She exhibits no edema.   Neurological: She is alert.   Skin: Skin is warm and dry. No rash noted. She is not diaphoretic. No erythema.   Nursing note and vitals  reviewed.      Significant Labs: All pertinent labs within the past 24 hours have been reviewed.    Significant Imaging: I have reviewed and interpreted all pertinent imaging results/findings within the past 24 hours.    Assessment/Plan:      * Acute gallstone pancreatitis  Has characteristic pain, lipase >1000, and changes consistent with pancreatitis of biliary origin on CT  No alcohol use  Lipids elevated but no hypertriglyceridemia ()  Started on IVF and pain control- decrease opioids  GI consulted for ERCP on 3/21- dilation of ducts, stones removed, cholangitis present. Started cipro on 3/21 for cholangitis  Gen Surg consulted for GB removal- will be done when pancreatitis resolves  T bili now decreasing.  Lipase decreasing.   Tolerating diet. Pain resolved  S/p lap cholecystectomy on 3/25-- severe acute on chronic cholecystitis with many gallstones on pathology; added flagyl  Tolerating diet  Discontinue IV pain meds, IV benadryl, IV phenergan  Drain still in place-- waiting for surgery to remove. OK to discharge after this.   Will continue cipro and flagyl for 1 week post op.  Follow up with GI and Surgery at discharge    Hypokalemia  Replace      Acute gastric ulcer without hemorrhage or perforation  As seen during ERCP- gastric antral ulcers  PPI BID started- needed until 5/22/2019  GI follow up       Ascending cholangitis  Seen on ERCP 3/21  No signs of sepsis  Started cipro IV on 3/21  Will continue cipro and flagyl for 7 days post op      Class 2 obesity with body mass index (BMI) of 37.0 to 37.9 in adult  Body mass index is 37.78 kg/m².  Encourage weight loss      Hyperlipidemia    HDL 69   Dietary changes and follow up as outpatient       Essential hypertension  Continue home valsartan        VTE Risk Mitigation (From admission, onward)        Ordered     enoxaparin injection 40 mg  Daily      03/25/19 1315     Place sequential compression device  Until discontinued       03/25/19 1315     IP VTE HIGH RISK PATIENT  Once      03/21/19 0521              Erin Riddle MD  Department of Hospital Medicine   [facility]

## 2019-03-27 NOTE — PROGRESS NOTES
Follow-up Information     Nish Richter III, MD On 4/9/2019.    Specialty:  Surgery  Why:  Outpatient Services Surgery Follow-Up Tuesday at 1:45 PM.  Contact information:  [place] [address] [phone]                 PLEASE BRING TO ALL FOLLOW UP APPOINTMENTS:   1) A COPY YOUR DISCHARGE INSTRUCTIONS   2) ALL MEDICINES YOU ARE CURRENTLY TAKING IN THEIR ORIGINAL BOTTLES   3) IDENTIFICATION CARD   4) INSURANCE CARD    **PLEASE ARRIVE 15 MINUTES AHEAD OF SCHEDULED APPOINTMENT TIME   ++PLEASE CALL 24 HOURS IN ADVANCE IF YOU MUST RESCHEDULE YOUR APPOINTMENT DAY AND/OR TIME     [facility]    WRITTEN HEALTHCARE AND DISCHARGE INFORMATION                        Help at Home           [phone]  After discharge for assistance [facility] On Call Nurse Care Line 24/7  Assistance    Things You are responsible For To Manage Your Care At Home:  1.    Getting your prescriptions filled   2.    Taking your medications as directed, DO NOT MISS ANY DOSES!  3.    Going to your follow-up doctor appointment. This is important because it  allow the doctor to monitor your progress and determine if  any changes need to made to your treatment plan.     Thank you for choosing [facility] for your care.  Please answer any calls you may receive from [facility] we want to continue to support you as you manage your healthcare needs. [facility] is happy to have the opportunity to serve you.     Sincerely,  Your [facility] Team,  Chioma Barnett RN [place]  Registered Nurse Transition Navigator  [phone]

## 2019-03-27 NOTE — PHYSICIAN QUERY
PT Name: Dea Ravi  MR #: [id number]    Physician Query Form - Relationship to Procedure Clarification     CDS: Suzanna RODRIGUEZ,RN        Contact information:[phone]  This form is a permanent document in the medical record.     Query Date: March 27, 2019      By submitting this query, we are merely seeking further clarification of documentation. Please utilize your independent clinical judgment when addressing the question(s) below.    The Medical record contains the following:  Supporting Clinical Findings Location in Medical Record   PREOPERATIVE DIAGNOSIS:  Resolving biliary pancreatitis.                 POSTOPERATIVE DIAGNOSIS:  Resolving biliary pancreatitis.               PROCEDURE:  Laparoscopic cholecystectomy. Next, following this, the cystic artery was identified as well  as the cystic duct.  They were skeletonized and was significantly dilated and  would not fit the clip.  The artery was clipped twice proximally, once distally and was transected.  After doing such, there was some oozing of blood from the gallbladder fossa in this area.  A Ray-Bill was placed within the abdomen.  Pressure was held here for 5 minutes.  The bleeding completely stopped.  The Ray-Bill was eventually placed and removed by me within the Endobag with the gallbladder. Next, the 11 mm trocar was changed to a 12 and an Endo-DREAD stapling device with a blue load was used to transect the infundibulum/cystic duct, it was fired, then remove, the staple line was intact.  There was no bleeding or leakage of bowel. This area was observed for several minutes.  There was no further bleeding.  The gallbladder was placed within the Endobag as  well as the Ray-Bill and eventually removed from the subumbilical trocar site.                  3/25/19 Op Note       Please clarify if the Intraabdominal  Bleeding ____________ is      [ x ] Inherent/Integral to procedure   [  ] Present, but not a complication of the procedure   [  ]  Incidental finding, not clinically significant   [  ] Complication of procedure   [  ] Other (please specify): __________________   [  ] Clinically Undetermined

## 2019-03-27 NOTE — NURSING
Patient discharged per MD order. IV removed; catheter tip intact. No distress noted. Discharge instructions explained; patient verbalized understanding. VSS. Afebrile. No complaint of pain, n/v, diarrhea, or SOB. RX provided. Patient left with belongings to Main Entrance via wheelchair per transport.     Drain removed by MD at bedside this a.m.

## 2019-03-27 NOTE — PROGRESS NOTES
[address] [place] contacted Dr. Richter's office at [phone]; spoke with Laura to schedule surgery f/u in 2 weeks; arranged on 04/09/19 at 1:45 PM.

## 2019-03-27 NOTE — DISCHARGE SUMMARY
[facility] Hospital Medicine  Discharge Summary      Patient Name: Dea Ravi  MRN: [id number]  Admission Date: 3/21/2019  Hospital Length of Stay: 6 days  Discharge Date and Time:  03/27/2019 11:48 AM  Attending Physician: No att. providers found   Discharging Provider: Erin Riddle MD  Primary Care Provider: Primary Doctor No      HPI:   Ms Dea Ravi is a 53 y.o. woman with HTN and obesity who presents for abdominal pain. She developed 9.5/10 crampy then sharp abdominal pain located across the upper part of her abdomen with no radiation after eating cereal last night. Nothing makes it better (other than pain meds) or worse. Associated with nausea and vomiting. No hematemesis. No fevers or chills. No change in BMs or urination. She has had intermittent crampy pain after eating cereal, corn, nuts (not fatty foods) for months. She has been told that she had gallbladder problems in the past. No alcohol. Takes valsartan only at home; no statin.     In ED, found to have biliary pancreatitis. Given IVF and pain control.     Procedure(s) (LRB):  CHOLECYSTECTOMY, LAPAROSCOPIC (N/A)      Hospital Course:   Admitted with biliary pancreatitis. Started on IVF and pain control. GI consulted. Patient went for ERCP on 3/21. ERCP showed clean based ulcers/erosions in stomach antrum, dilation of bile duct and common hepatic duct due to obstructing stone, choledocholithiasis, and pus consistent with cholangitis. Stones were removed. She was started on cipro IV BID for cholangitis and pantoprazole BID for ulcers. Surgery consulted for cholecystectomy.  T bili francisco after ERCP as expected from duct swelling; now decreasing.  Patient tolerating a diet. Pain is resolved. Went for lap cholecystectomy on 3/25-- severe chronic cholecystitis; added flagyl to cipro. Will plan to continue both cipro and flagyl for 7 days total. Will continue pantoprazole 40mg BID for gastric ulcers for 2 months per GI  recommendations. Surgery removed drain. Discharged to home.      Consults:   Consults (From admission, onward)        Status Ordering Provider     Inpatient consult to Gastroenterology  Once     Provider:  Travis Murphy MD    Completed AUTUMN GUZMAN     Inpatient consult to General Surgery  Once     Provider:  Papito Mcnamara MD    Acknowledged WILLEM WICK III            Final Active Diagnoses:    Diagnosis Date Noted POA    PRINCIPAL PROBLEM:  Acute gallstone pancreatitis [K85.10] 03/21/2019 Yes    Hypokalemia [E87.6] 03/26/2019 No    Essential hypertension [I10] 03/21/2019 Yes    Hyperlipidemia [E78.5] 03/21/2019 Yes    Class 2 obesity with body mass index (BMI) of 37.0 to 37.9 in adult [E66.9, Z68.37] 03/21/2019 Not Applicable    Ascending cholangitis [K83.09] 03/21/2019 Yes    Acute gastric ulcer without hemorrhage or perforation [K25.3] 03/21/2019 Yes      Problems Resolved During this Admission:    Diagnosis Date Noted Date Resolved POA    Vomiting [R11.10] 03/21/2019 03/23/2019 Yes       Discharged Condition: good    Disposition: Home or Self Care    Follow Up:  Follow-up Information     Willem Wick III, MD On 4/9/2019.    Specialty:  Surgery  Why:  Outpatient Services Surgery Follow-Up Tuesday at 1:45 PM.  Contact information:  [address] [place] [address]  [place] [address] [phone]                 Patient Instructions:      Diet Adult Regular     Notify your health care provider if you experience any of the following:  temperature >100.4     Notify your health care provider if you experience any of the following:  persistent nausea and vomiting or diarrhea     Notify your health care provider if you experience any of the following:  severe uncontrolled pain     Notify your health care provider if you experience any of the following:  redness, tenderness, or signs of infection (pain, swelling, redness, odor or green/yellow discharge around incision site)     Notify your health care  provider if you experience any of the following:  difficulty breathing or increased cough     Notify your health care provider if you experience any of the following:  severe persistent headache     Notify your health care provider if you experience any of the following:  worsening rash     Notify your health care provider if you experience any of the following:  persistent dizziness, light-headedness, or visual disturbances     Notify your health care provider if you experience any of the following:  increased confusion or weakness     Activity as tolerated       Significant Diagnostic Studies: Labs: All labs within the past 24 hours have been reviewed    Pending Diagnostic Studies:     None         Medications:  Reconciled Home Medications:      Medication List      START taking these medications    ciprofloxacin HCl 500 MG tablet  Commonly known as:  CIPRO  Take 1 tablet (500 mg total) by mouth every 12 (twelve) hours. for 5 days     metroNIDAZOLE 500 MG tablet  Commonly known as:  FLAGYL  Take 1 tablet (500 mg total) by mouth every 8 (eight) hours. for 5 days     oxyCODONE-acetaminophen  mg per tablet  Commonly known as:  PERCOCET  Take 1 tablet by mouth every 4 (four) hours as needed for Pain.     pantoprazole 40 MG tablet  Commonly known as:  PROTONIX  Take 1 tablet (40 mg total) by mouth 2 (two) times daily.        CONTINUE taking these medications    albuterol 90 mcg/actuation inhaler  Commonly known as:  PROVENTIL/VENTOLIN HFA  Inhale 2 puffs into the lungs every 6 (six) hours as needed for Wheezing. Rescue     valsartan 80 MG tablet  Commonly known as:  DIOVAN  Take 160 mg by mouth 2 (two) times daily.            Indwelling Lines/Drains at time of discharge:   Lines/Drains/Airways          None          Time spent on the discharge of patient: 35 minutes  Patient was seen and examined on the date of discharge and determined to be suitable for discharge.         Erin Riddle MD  Department of  Hospital Medicine [facility]

## 2019-03-27 NOTE — ASSESSMENT & PLAN NOTE
As seen during ERCP- gastric antral ulcers  PPI BID started- needed until 5/22/2019  GI follow up

## 2019-03-27 NOTE — ASSESSMENT & PLAN NOTE
Has characteristic pain, lipase >1000, and changes consistent with pancreatitis of biliary origin on CT  No alcohol use  Lipids elevated but no hypertriglyceridemia ()  Started on IVF and pain control- decrease opioids  GI consulted for ERCP on 3/21- dilation of ducts, stones removed, cholangitis present. Started cipro on 3/21 for cholangitis  Gen Surg consulted for GB removal- will be done when pancreatitis resolves  T bili now decreasing.  Lipase decreasing.   Tolerating diet. Pain resolved  S/p lap cholecystectomy on 3/25-- severe acute on chronic cholecystitis with many gallstones on pathology; added flagyl  Tolerating diet  Discontinue IV pain meds, IV benadryl, IV phenergan  Drain still in place-- waiting for surgery to remove. OK to discharge after this.   Will continue cipro and flagyl for 1 week post op.  Follow up with GI and Surgery at discharge

## 2019-03-27 NOTE — ASSESSMENT & PLAN NOTE
Seen on ERCP 3/21  No signs of sepsis  Started cipro IV on 3/21  Will continue cipro and flagyl for 7 days post op

## 2019-03-27 NOTE — PROGRESS NOTES
Surgery Progress Note    Primary Problem: Acute gallstone pancreatitis    Other problems:   Active Hospital Problems    Diagnosis  POA    *Acute gallstone pancreatitis [K85.10]  Yes    Hypokalemia [E87.6]  No    Essential hypertension [I10]  Yes    Hyperlipidemia [E78.5]  Yes    Class 2 obesity with body mass index (BMI) of 37.0 to 37.9 in adult [E66.9, Z68.37]  Not Applicable    Ascending cholangitis [K83.09]  Yes    Acute gastric ulcer without hemorrhage or perforation [K25.3]  Yes      Resolved Hospital Problems    Diagnosis Date Resolved POA    Vomiting [R11.10] 03/23/2019 Yes       HD #  LOS: 6 days   POD #2 Days Post-Op s/p Lap Olena    Overnight events: Some nausea yesterday. Tolerated clears. Pain is minimal. Wishes to go home later today. C/o some calf swelling.    Diet - Diet Adult Regular (IDDSI Level 7)     I/O last 3 completed shifts:  In: 4727.1 [P.O.:600; I.V.:3827.1; IV Piggyback:300]  Out: 2355 [Urine:2150; Drains:205]    Intake/Output Summary (Last 24 hours) at 3/27/2019 0624  Last data filed at 3/27/2019 0557  Gross per 24 hour   Intake 1342.5 ml   Output 1330 ml   Net 12.5 ml       Temp:  [98.3 °F (36.8 °C)-99.2 °F (37.3 °C)] 98.6 °F (37 °C)  Pulse:  [84-92] 85  Resp:  [17-18] 18  SpO2:  [93 %-98 %] 93 %  BP: (137-144)/(75-85) 138/85    Gen: alert, well appearing, and in no distress,    Abdomen: Abdomen soft, non tender. Incisions c/d/i    Bilateral calfs soft. Neg racquel's    Recent Labs   Lab 03/21/19  0206 03/22/19  0535 03/23/19  0425 03/24/19  0340 03/25/19  0513 03/26/19  0952   WBC 8.03  --   --   --   --   --    HGB 14.4  --   --   --   --   --    HCT 44.4  --   --   --   --   --    *  --   --   --   --   --     137 136 135* 139 135*   K 3.6 3.5 3.9 3.2* 3.1* 3.1*    105 108 107 106 104   BUN 10 6 7 3* 3* 3*   GLU 98 76 87 85 86 93   PROT 7.8 6.9 6.0 5.9* 6.3  6.3 6.1   ALBUMIN 4.1 3.5 2.9* 2.7* 2.8*  2.8* 2.6*   BILITOT 0.9 5.6* 1.8* 1.1* 0.8  0.8 0.8   AST  677* 372* 148* 68* 33  33 37   ALKPHOS 180* 257* 230* 202* 174*  174* 144*   * 484* 300* 211* 152*  151* 105*        Assessment: s/p Anita Hyatt    Plan: HLIV  Home later today if she tolerates a diet    Papito Mcnamara MD

## 2019-03-27 NOTE — SUBJECTIVE & OBJECTIVE
Interval History:  Wants to go home. No BM. Occasional abdominal pain. Not requiring IV pain meds.     Review of Systems   HENT: Negative for congestion.    Respiratory: Negative for cough, chest tightness and shortness of breath.    Cardiovascular: Negative for chest pain.   Gastrointestinal: Positive for abdominal pain and constipation. Negative for abdominal distention, diarrhea, nausea and vomiting.   Genitourinary: Negative for difficulty urinating.     Objective:     Vital Signs (Most Recent):  Temp: 98.1 °F (36.7 °C) (03/27/19 0721)  Pulse: 79 (03/27/19 0721)  Resp: 18 (03/27/19 0721)  BP: 135/80 (03/27/19 0721)  SpO2: 99 % (03/27/19 0829) Vital Signs (24h Range):  Temp:  [98.1 °F (36.7 °C)-99.2 °F (37.3 °C)] 98.1 °F (36.7 °C)  Pulse:  [79-92] 79  Resp:  [17-18] 18  SpO2:  [93 %-99 %] 99 %  BP: (135-140)/(75-85) 135/80     Weight: 90.7 kg (199 lb 15.3 oz)  Body mass index is 37.78 kg/m².    Intake/Output Summary (Last 24 hours) at 3/27/2019 1006  Last data filed at 3/27/2019 0822  Gross per 24 hour   Intake 4950.42 ml   Output 2880 ml   Net 2070.42 ml      Physical Exam   Constitutional: She appears well-developed and well-nourished. No distress.   obese   HENT:   Head: Normocephalic and atraumatic.   Nose: Nose normal.   Mouth/Throat: Oropharynx is clear and moist.   Cardiovascular: Normal rate, regular rhythm and normal heart sounds. Exam reveals no gallop and no friction rub.   No murmur heard.  Pulmonary/Chest: Effort normal and breath sounds normal. No stridor. No respiratory distress. She has no wheezes. She has no rales.   On room air   Abdominal: Soft. Bowel sounds are normal. She exhibits no distension and no mass. There is no tenderness. There is no rebound and no guarding.   Surgical bandages intact   Musculoskeletal: She exhibits no edema.   Neurological: She is alert.   Skin: Skin is warm and dry. No rash noted. She is not diaphoretic. No erythema.   Nursing note and vitals  reviewed.      Significant Labs: All pertinent labs within the past 24 hours have been reviewed.    Significant Imaging: I have reviewed and interpreted all pertinent imaging results/findings within the past 24 hours.

## 2019-03-28 ENCOUNTER — NURSE TRIAGE (OUTPATIENT)
Dept: ADMINISTRATIVE | Facility: CLINIC | Age: 54
End: 2019-03-28

## 2019-03-28 ENCOUNTER — HOSPITAL ENCOUNTER (EMERGENCY)
Facility: HOSPITAL | Age: 54
Discharge: LEFT BEFORE DISCHARGE | End: 2019-03-28
Payer: OTHER GOVERNMENT

## 2019-03-28 VITALS
WEIGHT: 200 LBS | HEART RATE: 86 BPM | DIASTOLIC BLOOD PRESSURE: 86 MMHG | OXYGEN SATURATION: 99 % | TEMPERATURE: 99 F | BODY MASS INDEX: 37.76 KG/M2 | HEIGHT: 61 IN | RESPIRATION RATE: 20 BRPM | SYSTOLIC BLOOD PRESSURE: 191 MMHG

## 2019-03-28 DIAGNOSIS — R05.9 COUGH: ICD-10-CM

## 2019-03-28 DIAGNOSIS — R06.02 SHORTNESS OF BREATH: ICD-10-CM

## 2019-03-28 PROCEDURE — 99283 EMERGENCY DEPT VISIT LOW MDM: CPT | Mod: 25

## 2019-03-28 RX ORDER — IPRATROPIUM BROMIDE AND ALBUTEROL SULFATE 2.5; .5 MG/3ML; MG/3ML
3 SOLUTION RESPIRATORY (INHALATION)
Status: DISCONTINUED | OUTPATIENT
Start: 2019-03-28 | End: 2019-03-29 | Stop reason: HOSPADM

## 2019-03-29 NOTE — ED PROVIDER NOTES
Encounter Date: 3/28/2019    Patient presents with shortness of breath that is been on for a few days.  Patient states shortness of breath is worse with exertion and talking.  Patient ambulated into ER.  Patient states that she is hearing some wheezing in her chest.     History   No chief complaint on file.    HPI  Review of patient's allergies indicates:  No Known Allergies  Past Medical History:   Diagnosis Date    High cholesterol     Hypertension     Thyroid disease      Past Surgical History:   Procedure Laterality Date    CHOLECYSTECTOMY, LAPAROSCOPIC N/A 3/25/2019    Performed by Nish Richter III, MD at [facility] OR    ERCP, WITH SPHINCTEROTOMY N/A 3/21/2019    Performed by Layla Carrillo MD at [facility] ENDO    WISDOM TOOTH EXTRACTION       Family History   Problem Relation Age of Onset    Cancer Mother         breast    Diverticulitis Mother     Diverticulitis Father      Social History     Tobacco Use    Smoking status: Never Smoker    Smokeless tobacco: Never Used   Substance Use Topics    Alcohol use: No     Frequency: Never    Drug use: No     Review of Systems    Physical Exam     Initial Vitals   BP Pulse Resp Temp SpO2   -- -- -- -- --      MAP       --         Physical Exam    ED Course   Procedures  Labs Reviewed - No data to display       Imaging Results    None                               Clinical Impression:       ICD-10-CM ICD-9-CM   1. Cough R05 786.2   2. Shortness of breath R06.02 786.05                                Fatoumata Sal, BABAK  03/30/19 1029

## 2019-03-29 NOTE — TELEPHONE ENCOUNTER
Patient called to report the following:     -cough and wheezing   -wheezing started about an hour ago  -I am running out of air, since yesterday , reports difficulty breathing   -temp 99.3  -denies severe difficulty breathing, fever, cp  -advised to report to ED     Reason for Disposition   [1] MODERATE difficulty breathing (e.g., speaks in phrases, SOB even at rest, pulse 100-120) AND [2] NEW-onset or WORSE than normal    Protocols used: ST BREATHING DIFFICULTY-A-AH

## 2019-03-29 NOTE — ED NOTES
Patient called into the sort room to do intake and implement orders. Patient stated that she was upset that she was not seen in [facility] because all she needed was an xray. Patient was triaged as an ED patient because of elevated BP. Patient stated that she would not wait to be seen in the ED and that she was leaving.

## 2019-04-02 NOTE — ANESTHESIA POSTPROCEDURE EVALUATION
Anesthesia Post Evaluation    Patient: Dea Ravi    Procedure(s) Performed: Procedure(s) (LRB):  ERCP, WITH SPHINCTEROTOMY (N/A)    Final Anesthesia Type: general  Patient location during evaluation: GI PACU  Patient participation: Yes- Able to Participate  Level of consciousness: awake and alert  Post-procedure vital signs: reviewed and stable  Pain management: adequate  Airway patency: patent  PONV status at discharge: No PONV  Anesthetic complications: no      Cardiovascular status: hemodynamically stable and blood pressure returned to baseline  Respiratory status: unassisted and spontaneous ventilation  Hydration status: euvolemic  Follow-up not needed.          Event Time     Out of Recovery 17:15:00          Pain/Adonis Score: No data recorded

## 2019-04-05 NOTE — ANESTHESIA POSTPROCEDURE EVALUATION
Anesthesia Post Evaluation    Patient: Dea Ravi    Procedure(s) Performed: Procedure(s) (LRB):  CHOLECYSTECTOMY, LAPAROSCOPIC (N/A)    Final Anesthesia Type: general  Patient location during evaluation: PACU  Patient participation: Yes- Able to Participate  Level of consciousness: awake and alert and oriented  Post-procedure vital signs: reviewed and stable  Pain management: adequate  Airway patency: patent  PONV status at discharge: No PONV  Anesthetic complications: no      Cardiovascular status: blood pressure returned to baseline and hemodynamically stable  Respiratory status: unassisted, spontaneous ventilation and room air  Hydration status: euvolemic  Follow-up not needed.            Event Time     Out of Recovery 03/25/2019 13:42:39

## 2019-04-10 ENCOUNTER — HOSPITAL ENCOUNTER (OUTPATIENT)
Dept: RADIOLOGY | Facility: HOSPITAL | Age: 54
Discharge: HOME | End: 2019-04-10
Attending: SURGERY
Payer: OTHER GOVERNMENT

## 2019-04-10 DIAGNOSIS — R05.9 COUGH: Primary | ICD-10-CM

## 2019-04-10 DIAGNOSIS — R05.9 COUGH: ICD-10-CM

## 2019-04-10 PROCEDURE — 71046 XR CHEST PA AND LATERAL: ICD-10-PCS | Mod: 26,,, | Performed by: RADIOLOGY

## 2019-04-10 PROCEDURE — 71046 X-RAY EXAM CHEST 2 VIEWS: CPT | Mod: TC,FY

## 2019-04-10 PROCEDURE — 71046 X-RAY EXAM CHEST 2 VIEWS: CPT | Mod: 26,,, | Performed by: RADIOLOGY

## 2019-12-15 ENCOUNTER — OFFICE VISIT (OUTPATIENT)
Dept: URGENT CARE | Facility: CLINIC | Age: 54
End: 2019-12-15
Payer: OTHER GOVERNMENT

## 2019-12-15 VITALS
SYSTOLIC BLOOD PRESSURE: 137 MMHG | BODY MASS INDEX: 37.76 KG/M2 | DIASTOLIC BLOOD PRESSURE: 96 MMHG | HEIGHT: 61 IN | RESPIRATION RATE: 18 BRPM | HEART RATE: 86 BPM | OXYGEN SATURATION: 99 % | TEMPERATURE: 98 F | WEIGHT: 200 LBS

## 2019-12-15 DIAGNOSIS — J02.9 SORE THROAT: ICD-10-CM

## 2019-12-15 DIAGNOSIS — J10.1 INFLUENZA A: Primary | ICD-10-CM

## 2019-12-15 LAB
CTP QC/QA: YES
CTP QC/QA: YES
FLUAV AG NPH QL: POSITIVE
FLUBV AG NPH QL: NEGATIVE
S PYO RRNA THROAT QL PROBE: NEGATIVE

## 2019-12-15 PROCEDURE — 87804 POCT INFLUENZA A/B: ICD-10-PCS | Mod: QW,S$GLB,, | Performed by: PHYSICIAN ASSISTANT

## 2019-12-15 PROCEDURE — 99214 PR OFFICE/OUTPT VISIT, EST, LEVL IV, 30-39 MIN: ICD-10-PCS | Mod: S$GLB,,, | Performed by: PHYSICIAN ASSISTANT

## 2019-12-15 PROCEDURE — 87880 POCT RAPID STREP A: ICD-10-PCS | Mod: QW,S$GLB,, | Performed by: PHYSICIAN ASSISTANT

## 2019-12-15 PROCEDURE — 87880 STREP A ASSAY W/OPTIC: CPT | Mod: QW,S$GLB,, | Performed by: PHYSICIAN ASSISTANT

## 2019-12-15 PROCEDURE — 99214 OFFICE O/P EST MOD 30 MIN: CPT | Mod: S$GLB,,, | Performed by: PHYSICIAN ASSISTANT

## 2019-12-15 PROCEDURE — 87804 INFLUENZA ASSAY W/OPTIC: CPT | Mod: QW,S$GLB,, | Performed by: PHYSICIAN ASSISTANT

## 2019-12-15 RX ORDER — PROMETHAZINE HYDROCHLORIDE AND DEXTROMETHORPHAN HYDROBROMIDE 6.25; 15 MG/5ML; MG/5ML
5 SYRUP ORAL NIGHTLY PRN
Qty: 150 ML | Refills: 0 | Status: SHIPPED | OUTPATIENT
Start: 2019-12-15 | End: 2019-12-25

## 2019-12-15 NOTE — PROGRESS NOTES
"Subjective:       Patient ID: Dea Ravi is a 54 y.o. female.    Vitals:  height is 5' 1" (1.549 m) and weight is 90.7 kg (200 lb). Her temperature is 98 °F (36.7 °C). Her blood pressure is 137/96 (abnormal) and her pulse is 86. Her respiration is 18 and oxygen saturation is 99%.     Chief Complaint: URI    Patient states that on Tuesday, she started having sinus congestion and sore throat with productive cough. States that she saw her doctor and was given a Zpak and tessalon which hasn't been helping. No tests were done at this time. She did not get her flu shot this year.    URI    This is a new problem. Episode onset: 7 days  The problem has been gradually worsening. The maximum temperature recorded prior to her arrival was 101 - 101.9 F. The fever has been present for less than 1 day. Associated symptoms include coughing and a sore throat. Pertinent negatives include no abdominal pain, dysuria, nausea, rash or vomiting. She has tried decongestant (zpak ) for the symptoms. The treatment provided no relief.       Constitution: Negative for chills and fever.   HENT: Positive for sore throat, trouble swallowing and voice change.    Neck: Negative for painful lymph nodes.   Respiratory: Positive for cough and sputum production.    Gastrointestinal: Negative for abdominal pain, nausea and vomiting.   Genitourinary: Negative for dysuria, frequency, urgency, urine decreased, hematuria, history of kidney stones, painful menstruation, irregular menstruation, missed menses, heavy menstrual bleeding, ovarian cysts, genital trauma, vaginal pain, vaginal discharge, vaginal bleeding, vaginal odor, painful intercourse, genital sore, painful ejaculation and pelvic pain.   Musculoskeletal: Negative for back pain.   Skin: Negative for rash and lesion.   Hematologic/Lymphatic: Negative for swollen lymph nodes.       Objective:      Physical Exam   Constitutional: She is oriented to person, place, and time. She appears " well-developed and well-nourished. She is cooperative.  Non-toxic appearance. She does not have a sickly appearance. She does not appear ill. No distress.   HENT:   Head: Normocephalic and atraumatic.   Right Ear: Hearing, external ear and ear canal normal. A middle ear effusion is present.   Left Ear: Hearing, external ear and ear canal normal. A middle ear effusion is present.   Nose: Mucosal edema present. No rhinorrhea or nasal deformity. No epistaxis. Right sinus exhibits maxillary sinus tenderness and frontal sinus tenderness. Left sinus exhibits maxillary sinus tenderness and frontal sinus tenderness.   Mouth/Throat: Uvula is midline and mucous membranes are normal. No trismus in the jaw. Normal dentition. No uvula swelling. Posterior oropharyngeal erythema present. No oropharyngeal exudate or posterior oropharyngeal edema.   Eyes: Conjunctivae and lids are normal. No scleral icterus.   Neck: Trachea normal, full passive range of motion without pain and phonation normal. Neck supple. No neck rigidity. No edema and no erythema present.   Cardiovascular: Normal rate, regular rhythm, normal heart sounds, intact distal pulses and normal pulses.   Pulmonary/Chest: Effort normal and breath sounds normal. No respiratory distress. She has no decreased breath sounds. She has no wheezes. She has no rhonchi.   Abdominal: Normal appearance.   Musculoskeletal: Normal range of motion. She exhibits no edema or deformity.   Neurological: She is alert and oriented to person, place, and time. She exhibits normal muscle tone. Coordination normal.   Skin: Skin is warm, dry, intact, not diaphoretic and not pale.   Psychiatric: She has a normal mood and affect. Her speech is normal and behavior is normal. Judgment and thought content normal. Cognition and memory are normal.   Nursing note and vitals reviewed.        Recent Results (from the past 48 hour(s))   POCT Influenza A/B    Collection Time: 12/15/19  1:19 PM   Result Value  Ref Range    Rapid Influenza A Ag Positive (A) Negative    Rapid Influenza B Ag Negative Negative     Acceptable Yes    POCT rapid strep A    Collection Time: 12/15/19  1:19 PM   Result Value Ref Range    Rapid Strep A Screen Negative Negative     Acceptable Yes    ]    Assessment:       1. Influenza A    2. Sore throat        Plan:         Influenza A  -     promethazine-dextromethorphan (PROMETHAZINE-DM) 6.25-15 mg/5 mL Syrp; Take 5 mLs by mouth nightly as needed (cough).  Dispense: 150 mL; Refill: 0    Sore throat  -     POCT Influenza A/B  -     POCT rapid strep A      Patient Instructions     You have been diagnosed with Influenza.   You are contagious for 24 hours after your last fever.  Please drink plenty of fluids.  Please get plenty of rest.  Please return here or go to the Emergency Department for any concerns or worsening of condition.  Tamiflu prescription has been discussed and if prescribed, please take to completion unless you cannot tolerate the side effects.     Take tylenol (acetominophen) for fever, chills or body aches every 4 hours. do not exceed 4000 mg/ day.  Take Motrin (Ibuprofen) every 4 hours for fever, chills, pain or inflammation.  Use an antihistmine such as claritin or zyrtec to dry you out. Use pseudoephedrine (behind the counter) to decongest (beware this can raise your blood pressure). Use mucinex (guaifenisin) to break up mucous      The Flu (Influenza)     The virus that causes the flu spreads through the air in droplets when someone who has the flu coughs, sneezes, laughs, or talks.   The flu (influenza) is an infection that affects your respiratory tract. This tract is made up of your mouth, nose, and lungs, and the passages between them. Unlike a cold, the flu can make you very ill. And it can lead to pneumonia, a serious lung infection. The flu can have serious complications and even cause death.  Who is at risk for the flu?  Anyone can get the  flu. But you are more likely to become infected if you:  · Have a weakened immune system  · Work in a healthcare setting where you may be exposed to flu germs  · Live or work with someone who has the flu  · Havent had an annual flu shot  How does the flu spread?  The flu is caused by a virus. The virus spreads through the air in droplets when someone who has the flu coughs, sneezes, laughs, or talks. You can become infected when you inhale these viruses directly. You can also become infected when you touch a surface on which the droplets have landed and then transfer the germs to your eyes, nose, or mouth. Touching used tissues, or sharing utensils, drinking glasses, or a toothbrush from an infected person can expose you to flu viruses, too.  What are the symptoms of the flu?  Flu symptoms tend to come on quickly and may last a few days to a few weeks. They include:  · Fever usually higher than 100.4°F  (38°C) and chills  · Sore throat and headache  · Dry cough  · Runny nose  · Tiredness and weakness  · Muscle aches  Who is at risk for flu complications?  For some people, the flu can be very serious. The risk for complications is greater for:  · Children younger than age 5  · Adults ages 65 and older  · People with a chronic illness such as diabetes or heart, kidney, or lung disease  · People who live in a nursing home or long-term care facility   How is the flu treated?  The flu usually gets better after 7 days or so. In some cases, your healthcare provider may prescribe an antiviral medicine. This may help you get well a little sooner. For the medicine to help, you need to take it as soon as possible (ideally within 48 hours) after your symptoms start. If you develop pneumonia or other serious illness, you may need to stay in the hospital.  Easing flu symptoms  · Drink lots of fluids such as water, juice, and warm soup. A good rule is to drink enough so that you urinate your normal amount.  · Get plenty of  rest.  · Ask your healthcare provider what to take for fever and pain.  · Call your provider if your fever is 100.4°F (38°C) or higher, or you become dizzy, lightheaded, or short of breath.  Taking steps to protect others  · Wash your hands often, especially after coughing or sneezing. Or clean your hands with an alcohol-based hand  containing at least 60% alcohol.  · Cough or sneeze into a tissue. Then throw the tissue away and wash your hands. If you dont have a tissue, cough and sneeze into your elbow.  · Stay home until at least 24 hours after you no longer have a fever or chills. Be sure the fever isnt being hidden by fever-reducing medicine.  · Dont share food, utensils, drinking glasses, or a toothbrush with others.  · Ask your healthcare provider if others in your household should get antiviral medicine to help them avoid infection.  How can the flu be prevented?  · One of the best ways to avoid the flu is to get a flu vaccine each year. The virus that causes the flu changes from year to year. For that reason, healthcare providers recommend getting the flu vaccine each year, as soon as it's available in your area. The vaccine is given as a shot. Your healthcare provider can tell you which vaccine is right for you. A nasal spray is also available but is not recommended for the 6228-5024 flu season. The CDC says this is because the nasal spray did not seem to protect against the flu over the last several flu seasons. In the past, it was meant for people ages 2 to 49.  · Wash your hands often. Frequent handwashing is a proven way to help prevent infection.  · Carry an alcohol-based hand gel containing at least 60% alcohol. Use it when you can't use soap and water. Then wash your hands as soon as you can.  · Avoid touching your eyes, nose, and mouth.  · At home and work, clean phones, computer keyboards, and toys often with disinfectant wipes.  · If possible, avoid close contact with others who have  the flu or symptoms of the flu.  Handwashing tips  Handwashing is one of the best ways to prevent many common infections. If you are caring for or visiting someone with the flu, wash your hands each time you enter and leave the room. Follow these steps:  · Use warm water and plenty of soap. Rub your hands together well.  · Clean the whole hand, including under your nails, between your fingers, and up the wrists.  · Wash for at least 15 seconds.  · Rinse, letting the water run down your fingers, not up your wrists.  · Dry your hands well. Use a paper towel to turn off the faucet and open the door.  Using alcohol-based hand   Alcohol-based hand  are also a good choice. Use them when you can't use soap and water. Follow these steps:  · Squeeze about a tablespoon of gel into the palm of one hand.  · Rub your hands together briskly, cleaning the backs of your hands, the palms, between your fingers, and up the wrists.  · Rub until the gel is gone and your hands are completely dry.  Preventing the flu in healthcare settings  The flu is a special concern for people in hospitals and long-term care facilities. To help prevent the spread of flu, many hospitals and nursing homes take these steps:  · Healthcare providers wash their hands or use an alcohol-based hand  before and after treating each patient.  · People with the flu have private rooms and bathrooms or share a room with someone with the same infection.  · People who are at high risk for the flu but don't have it are encouraged to get the flu and pneumonia vaccines.  · All healthcare workers are encouraged or required to get flu shots.   Date Last Reviewed: 12/1/2016  © 8801-3740 [employer]. [address] [place] [address], [place] [address]. All rights reserved. This information is not intended as a substitute for professional medical care. Always follow your healthcare professional's instructions.

## 2019-12-15 NOTE — LETTER
December 15, 2019      [facility] [address], [address] [place] [address]  Phone: [phone]  Fax: [phone]       Patient: Dea Ravi   YOB: 1965  Date of Visit: 12/15/2019    To Whom It May Concern:    Kory aRvi  was at [facility] on 12/15/2019. She may return to work/school on 12/17/2019 with no restrictions if afebrile (no fever over 100.4F) for over 24 hours. If you have any questions or concerns, or if I can be of further assistance, please do not hesitate to contact me.    Sincerely,      Joseph Christianson PA-C

## 2020-10-26 ENCOUNTER — OFFICE VISIT (OUTPATIENT)
Dept: URGENT CARE | Facility: CLINIC | Age: 55
End: 2020-10-26
Payer: OTHER GOVERNMENT

## 2020-10-26 VITALS
WEIGHT: 200 LBS | DIASTOLIC BLOOD PRESSURE: 104 MMHG | OXYGEN SATURATION: 97 % | SYSTOLIC BLOOD PRESSURE: 162 MMHG | HEART RATE: 80 BPM | TEMPERATURE: 97 F | BODY MASS INDEX: 37.79 KG/M2

## 2020-10-26 DIAGNOSIS — J02.9 SORE THROAT: ICD-10-CM

## 2020-10-26 DIAGNOSIS — J06.9 VIRAL URI: Primary | ICD-10-CM

## 2020-10-26 LAB
CTP QC/QA: YES
MOLECULAR STREP A: NEGATIVE

## 2020-10-26 PROCEDURE — 99214 OFFICE O/P EST MOD 30 MIN: CPT | Mod: S$GLB,,, | Performed by: NURSE PRACTITIONER

## 2020-10-26 PROCEDURE — 99214 PR OFFICE/OUTPT VISIT, EST, LEVL IV, 30-39 MIN: ICD-10-PCS | Mod: S$GLB,,, | Performed by: NURSE PRACTITIONER

## 2020-10-26 PROCEDURE — 87651 POCT STREP A MOLECULAR: ICD-10-PCS | Mod: QW,S$GLB,, | Performed by: NURSE PRACTITIONER

## 2020-10-26 PROCEDURE — 87651 STREP A DNA AMP PROBE: CPT | Mod: QW,S$GLB,, | Performed by: NURSE PRACTITIONER

## 2020-10-26 RX ORDER — FLUTICASONE PROPIONATE 50 MCG
1 SPRAY, SUSPENSION (ML) NASAL 2 TIMES DAILY
Qty: 15.8 ML | Refills: 0 | Status: SHIPPED | OUTPATIENT
Start: 2020-10-26 | End: 2020-10-26

## 2020-10-26 NOTE — PATIENT INSTRUCTIONS
You can try afrin, then sinus rinse, then flonase (in that order) twice daily. Do not use afrin for more than 3 days in a row  You can also try sudafed or sudafed PE       If not allergic,take tylenol (acetominophen) for fever control, chills, or body aches every 4 hours. Do not exceed 4000 mg/ day.If not allergic, take Motrin (Ibuprofen) every 4 hours for fever, chills, pain or inflammation. Do not exceed 2400 mg/day. You can alternate taking tylenol and motrin.    You must understand that you've received an Urgent Care treatment only and that you may be released before all your medical problems are known or treated. You, the patient, will arrange for follow up care as instructed.     Follow up with your PCP or specialty clinic as instructed in the next 2-3 days if not improved or as needed. You can call [phone] to schedule an appointment with appropriate provider.     If you condition worsens, we recommend that you receive another evaluation at the emergency room immediately or contact your primary medical clinic's after hours call service to discuss your concerns.     Please return here or go to the Emergency Department for any concerns or worsening condition.     If you were prescribed a narcotic or controlled substance, do not drive or operate heavy equipment or machinery while taking these medications.     Viral Upper Respiratory Illness (Adult)  You have a viral upper respiratory illness (URI), which is another term for the common cold. This illness is contagious during the first few days. It is spread through the air by coughing and sneezing. It may also be spread by direct contact (touching the sick person and then touching your own eyes, nose, or mouth). Frequent handwashing will decrease risk of spread. Most viral illnesses go away within 7 to 10 days with rest and simple home remedies. Sometimes the illness may last for several weeks. Antibiotics will not kill a virus, and they are generally not  prescribed for this condition.    Home care  · If symptoms are severe, rest at home for the first 2 to 3 days. When you resume activity, don't let yourself get too tired.  · Avoid being exposed to cigarette smoke (yours or others).  · You may use acetaminophen or ibuprofen to control pain and fever, unless another medicine was prescribed. (Note: If you have chronic liver or kidney disease, have ever had a stomach ulcer or gastrointestinal bleeding, or are taking blood-thinning medicines, talk with your healthcare provider before using these medicines.) Aspirin should never be given to anyone under 18 years of age who is ill with a viral infection or fever. It may cause severe liver or brain damage.  · Your appetite may be poor, so a light diet is fine. Avoid dehydration by drinking 6 to 8 glasses of fluids per day (water, soft drinks, juices, tea, or soup). Extra fluids will help loosen secretions in the nose and lungs.  · Over-the-counter cold medicines will not shorten the length of time youre sick, but they may be helpful for the following symptoms: cough, sore throat, and nasal and sinus congestion. (Note: Do not use decongestants if you have high blood pressure.)  Follow-up care  Follow up with your healthcare provider, or as advised.  When to seek medical advice  Call your healthcare provider right away if any of these occur:  · Cough with lots of colored sputum (mucus)  · Severe headache; face, neck, or ear pain  · Difficulty swallowing due to throat pain  · Fever of 100.4°F (38°C)  Call 911, or get immediate medical care  Call emergency services right away if any of these occur:  · Chest pain, shortness of breath, wheezing, or difficulty breathing  · Coughing up blood  · Inability to swallow due to throat pain  Date Last Reviewed: 9/13/2015  © 5381-4393 [employer]. [address] [place] [address], [place] [address]. All rights reserved. This information is not intended as a substitute for  professional medical care. Always follow your healthcare professional's instructions.

## 2020-10-26 NOTE — PROGRESS NOTES
Subjective:       Patient ID: Dea Ravi is a 55 y.o. female.    Vitals:  weight is 90.7 kg (200 lb). Her temperature is 97.2 °F (36.2 °C). Her blood pressure is 162/104 (abnormal) and her pulse is 80. Her oxygen saturation is 97%.     Chief Complaint: Sore Throat    Pt states that she is having a severe sore throat. Pt states that her glands are swollen and has ear pressure. Also has runny nose, congestion, cough. No fever, CP, SOB, abd pain, n/v/d. She had covid 6 months ago.     Sore Throat   This is a new problem. The current episode started in the past 7 days. The problem has been unchanged. There has been no fever. The pain is at a severity of 2/10. The pain is mild. Pertinent negatives include no congestion, coughing, diarrhea, headaches, shortness of breath or vomiting. She has tried nothing for the symptoms.       Constitution: Negative for chills, fatigue and fever.   HENT: Positive for sore throat. Negative for congestion.    Neck: Negative for painful lymph nodes.   Cardiovascular: Negative for chest pain and leg swelling.   Eyes: Negative for double vision and blurred vision.   Respiratory: Negative for cough and shortness of breath.    Gastrointestinal: Negative for nausea, vomiting and diarrhea.   Genitourinary: Negative for dysuria, frequency, urgency and history of kidney stones.   Musculoskeletal: Negative for joint pain, joint swelling, muscle cramps and muscle ache.   Skin: Negative for color change, pale, rash and bruising.   Allergic/Immunologic: Negative for seasonal allergies.   Neurological: Negative for dizziness, history of vertigo, light-headedness, passing out and headaches.   Hematologic/Lymphatic: Negative for swollen lymph nodes.   Psychiatric/Behavioral: Negative for nervous/anxious, sleep disturbance and depression. The patient is not nervous/anxious.        Objective:      Physical Exam   Constitutional: She is oriented to person, place, and time. She appears well-developed.  She is cooperative.  Non-toxic appearance. She does not appear ill. No distress.   HENT:   Head: Normocephalic and atraumatic.   Ears:   Right Ear: Hearing, tympanic membrane, external ear and ear canal normal.   Left Ear: Hearing, tympanic membrane, external ear and ear canal normal.   Nose: Nose normal. No mucosal edema, rhinorrhea or nasal deformity. No epistaxis. Right sinus exhibits no maxillary sinus tenderness and no frontal sinus tenderness. Left sinus exhibits no maxillary sinus tenderness and no frontal sinus tenderness.   Mouth/Throat: Uvula is midline and mucous membranes are normal. No trismus in the jaw. Normal dentition. No uvula swelling. Posterior oropharyngeal erythema and cobblestoning present. No oropharyngeal exudate or posterior oropharyngeal edema. Tonsils are 0 on the right. Tonsils are 0 on the left. No tonsillar exudate.   Eyes: Pupils are equal, round, and reactive to light. Conjunctivae and lids are normal. No scleral icterus.   Neck: Trachea normal, normal range of motion, full passive range of motion without pain and phonation normal. Neck supple. No neck rigidity. No edema and no erythema present.   Cardiovascular: Normal rate, regular rhythm, normal heart sounds and normal pulses.   Pulmonary/Chest: Effort normal. No respiratory distress. She has no decreased breath sounds. She has no wheezes. She has no rhonchi. She has no rales.   Abdominal: Normal appearance.   Musculoskeletal: Normal range of motion.         General: No deformity.   Lymphadenopathy:     She has no cervical adenopathy.   Neurological: She is alert and oriented to person, place, and time. She exhibits normal muscle tone. Coordination normal.   Skin: Skin is warm, dry, intact, not diaphoretic and not pale. Psychiatric: Her speech is normal and behavior is normal. Judgment and thought content normal.   Nursing note and vitals reviewed.    Results for orders placed or performed in visit on 10/26/20   POCT Strep A,  Molecular   Result Value Ref Range    Molecular Strep A, POC Negative Negative     Acceptable Yes            Assessment:       1. Viral URI    2. Sore throat        Plan:         Viral URI  -     Discontinue: fluticasone propionate (FLONASE) 50 mcg/actuation nasal spray; 1 spray (50 mcg total) by Each Nostril route 2 (two) times daily.  Dispense: 15.8 mL; Refill: 0    Sore throat  -     POCT Strep A, Molecular         Reviewed previous pertinent office visits, PMH, PSH, fam hx  We discussed that this is likely a viral illness and will not benefit from antibiotics. Symptomatic care recommended.   Recommended otc motrin or tylenol as needed for fever/aches unless contraindicated  Advised on return/follow-up precautions. Advised on ER precautions. Answered all patient questions. Patient verbalized understanding and voiced agreement with current treatment plan.    Patient Instructions   You can try afrin, then sinus rinse, then flonase (in that order) twice daily. Do not use afrin for more than 3 days in a row  You can also try sudafed or sudafed PE       If not allergic,take tylenol (acetominophen) for fever control, chills, or body aches every 4 hours. Do not exceed 4000 mg/ day.If not allergic, take Motrin (Ibuprofen) every 4 hours for fever, chills, pain or inflammation. Do not exceed 2400 mg/day. You can alternate taking tylenol and motrin.    You must understand that you've received an Urgent Care treatment only and that you may be released before all your medical problems are known or treated. You, the patient, will arrange for follow up care as instructed.     Follow up with your PCP or specialty clinic as instructed in the next 2-3 days if not improved or as needed. You can call [phone] to schedule an appointment with appropriate provider.     If you condition worsens, we recommend that you receive another evaluation at the emergency room immediately or contact your primary medical  clinic's after hours call service to discuss your concerns.     Please return here or go to the Emergency Department for any concerns or worsening condition.     If you were prescribed a narcotic or controlled substance, do not drive or operate heavy equipment or machinery while taking these medications.     Viral Upper Respiratory Illness (Adult)  You have a viral upper respiratory illness (URI), which is another term for the common cold. This illness is contagious during the first few days. It is spread through the air by coughing and sneezing. It may also be spread by direct contact (touching the sick person and then touching your own eyes, nose, or mouth). Frequent handwashing will decrease risk of spread. Most viral illnesses go away within 7 to 10 days with rest and simple home remedies. Sometimes the illness may last for several weeks. Antibiotics will not kill a virus, and they are generally not prescribed for this condition.    Home care  · If symptoms are severe, rest at home for the first 2 to 3 days. When you resume activity, don't let yourself get too tired.  · Avoid being exposed to cigarette smoke (yours or others).  · You may use acetaminophen or ibuprofen to control pain and fever, unless another medicine was prescribed. (Note: If you have chronic liver or kidney disease, have ever had a stomach ulcer or gastrointestinal bleeding, or are taking blood-thinning medicines, talk with your healthcare provider before using these medicines.) Aspirin should never be given to anyone under 18 years of age who is ill with a viral infection or fever. It may cause severe liver or brain damage.  · Your appetite may be poor, so a light diet is fine. Avoid dehydration by drinking 6 to 8 glasses of fluids per day (water, soft drinks, juices, tea, or soup). Extra fluids will help loosen secretions in the nose and lungs.  · Over-the-counter cold medicines will not shorten the length of time youre sick, but they may  be helpful for the following symptoms: cough, sore throat, and nasal and sinus congestion. (Note: Do not use decongestants if you have high blood pressure.)  Follow-up care  Follow up with your healthcare provider, or as advised.  When to seek medical advice  Call your healthcare provider right away if any of these occur:  · Cough with lots of colored sputum (mucus)  · Severe headache; face, neck, or ear pain  · Difficulty swallowing due to throat pain  · Fever of 100.4°F (38°C)  Call 911, or get immediate medical care  Call emergency services right away if any of these occur:  · Chest pain, shortness of breath, wheezing, or difficulty breathing  · Coughing up blood  · Inability to swallow due to throat pain  Date Last Reviewed: 9/13/2015  © 1687-7922 [employer] [address] [place] [address], [place] [address]. All rights reserved. This information is not intended as a substitute for professional medical care. Always follow your healthcare professional's instructions.

## 2020-11-29 ENCOUNTER — OFFICE VISIT (OUTPATIENT)
Dept: URGENT CARE | Facility: CLINIC | Age: 55
End: 2020-11-29
Payer: OTHER GOVERNMENT

## 2020-11-29 VITALS
RESPIRATION RATE: 18 BRPM | OXYGEN SATURATION: 98 % | SYSTOLIC BLOOD PRESSURE: 160 MMHG | HEART RATE: 75 BPM | TEMPERATURE: 97 F | DIASTOLIC BLOOD PRESSURE: 94 MMHG

## 2020-11-29 DIAGNOSIS — J06.9 VIRAL URI: Primary | ICD-10-CM

## 2020-11-29 DIAGNOSIS — R05.9 COUGH: ICD-10-CM

## 2020-11-29 LAB
CTP QC/QA: YES
SARS-COV-2 RDRP RESP QL NAA+PROBE: NEGATIVE

## 2020-11-29 PROCEDURE — 99214 PR OFFICE/OUTPT VISIT, EST, LEVL IV, 30-39 MIN: ICD-10-PCS | Mod: S$GLB,,, | Performed by: NURSE PRACTITIONER

## 2020-11-29 PROCEDURE — 99214 OFFICE O/P EST MOD 30 MIN: CPT | Mod: S$GLB,,, | Performed by: NURSE PRACTITIONER

## 2020-11-29 PROCEDURE — U0002: ICD-10-PCS | Mod: QW,S$GLB,, | Performed by: NURSE PRACTITIONER

## 2020-11-29 PROCEDURE — U0002 COVID-19 LAB TEST NON-CDC: HCPCS | Mod: QW,S$GLB,, | Performed by: NURSE PRACTITIONER

## 2020-11-29 NOTE — PROGRESS NOTES
Subjective:       Patient ID: Dea Ravi is a 55 y.o. female.    Vitals:  temperature is 97.1 °F (36.2 °C). Her blood pressure is 160/94 (abnormal) and her pulse is 75. Her respiration is 18 and oxygen saturation is 98%.     Chief Complaint: URI    Pt states that she started with congestion and a cough about 2 days ago. She recently got back from [place]. She's had multiple co-workers test positive for covid in the last 2 weeks. She had covid about 6 months ago. No fever, chills, CP, SOB, abd pain, n/v/d.     URI   This is a new problem. The current episode started in the past 7 days. The problem has been unchanged. There has been no fever. Associated symptoms include congestion and coughing. Pertinent negatives include no ear pain, nausea, rash, sinus pain, sore throat, vomiting or wheezing. Treatments tried: [id number].       Constitution: Negative for chills, sweating, fatigue and fever.   HENT: Positive for congestion. Negative for ear pain, sinus pain, sinus pressure, sore throat and voice change.    Neck: Negative for painful lymph nodes.   Eyes: Negative for eye redness.   Respiratory: Positive for cough. Negative for chest tightness, sputum production, bloody sputum, COPD, shortness of breath, stridor, wheezing and asthma.    Gastrointestinal: Negative for nausea and vomiting.   Musculoskeletal: Negative for muscle ache.   Skin: Negative for rash.   Allergic/Immunologic: Negative for seasonal allergies and asthma.   Hematologic/Lymphatic: Negative for swollen lymph nodes.       Objective:      Physical Exam   Constitutional: She is oriented to person, place, and time. She appears well-developed. She is cooperative.  Non-toxic appearance. She does not appear ill. No distress.   HENT:   Head: Normocephalic and atraumatic.   Ears:   Right Ear: Hearing, tympanic membrane, external ear and ear canal normal.   Left Ear: Hearing, tympanic membrane, external ear and ear canal normal.   Nose: Congestion  present. No mucosal edema, rhinorrhea or nasal deformity. No epistaxis. Right sinus exhibits no maxillary sinus tenderness and no frontal sinus tenderness. Left sinus exhibits no maxillary sinus tenderness and no frontal sinus tenderness.   Mouth/Throat: Uvula is midline, oropharynx is clear and moist and mucous membranes are normal. Mucous membranes are moist. No trismus in the jaw. Normal dentition. No uvula swelling. No oropharyngeal exudate, posterior oropharyngeal edema or posterior oropharyngeal erythema. Oropharynx is clear.   Eyes: Pupils are equal, round, and reactive to light. Conjunctivae and lids are normal. No scleral icterus.   Neck: Trachea normal, normal range of motion, full passive range of motion without pain and phonation normal. Neck supple. No neck rigidity. No edema and no erythema present.   Cardiovascular: Normal rate, regular rhythm, normal heart sounds and normal pulses.   Pulmonary/Chest: Effort normal and breath sounds normal. No respiratory distress. She has no decreased breath sounds. She has no wheezes. She has no rhonchi. She has no rales.   Abdominal: Normal appearance.   Musculoskeletal: Normal range of motion.         General: No deformity.   Lymphadenopathy:     She has no cervical adenopathy.   Neurological: She is alert and oriented to person, place, and time. She exhibits normal muscle tone. Coordination normal.   Skin: Skin is warm, dry, intact, not diaphoretic and not pale. Psychiatric: Her speech is normal and behavior is normal. Judgment and thought content normal.   Nursing note and vitals reviewed.    Results for orders placed or performed in visit on 11/29/20   POCT COVID-19 Rapid Screening   Result Value Ref Range    POC Rapid COVID Negative Negative     Acceptable Yes            Assessment:       1. Viral URI    2. Cough        Plan:         Viral URI    Cough  -     POCT COVID-19 Rapid Screening         Reviewed previous pertinent office visits, [facility],  PSH, fam hx  We discussed that this is likely a viral illness and will not benefit from antibiotics. Symptomatic care recommended.   Recommended otc motrin or tylenol as needed for fever/aches unless contraindicated  Advised on return/follow-up precautions. Advised on ER precautions. Answered all patient questions. Patient verbalized understanding and voiced agreement with current treatment plan.    Patient Instructions   Try dayquil for daytime cough symptoms  Continue with codeine cough syrup at night  Follow up with PCP if needed      Viral Upper Respiratory Illness (Adult)  You have a viral upper respiratory illness (URI), which is another term for the common cold. This illness is contagious during the first few days. It is spread through the air by coughing and sneezing. It may also be spread by direct contact (touching the sick person and then touching your own eyes, nose, or mouth). Frequent handwashing will decrease risk of spread. Most viral illnesses go away within 7 to 10 days with rest and simple home remedies. Sometimes the illness may last for several weeks. Antibiotics will not kill a virus, and they are generally not prescribed for this condition.    Home care  · If symptoms are severe, rest at home for the first 2 to 3 days. When you resume activity, don't let yourself get too tired.  · Avoid being exposed to cigarette smoke (yours or others).  · You may use acetaminophen or ibuprofen to control pain and fever, unless another medicine was prescribed. (Note: If you have chronic liver or kidney disease, have ever had a stomach ulcer or gastrointestinal bleeding, or are taking blood-thinning medicines, talk with your healthcare provider before using these medicines.) Aspirin should never be given to anyone under 18 years of age who is ill with a viral infection or fever. It may cause severe liver or brain damage.  · Your appetite may be poor, so a light diet is fine. Avoid dehydration by drinking 6 to  8 glasses of fluids per day (water, soft drinks, juices, tea, or soup). Extra fluids will help loosen secretions in the nose and lungs.  · Over-the-counter cold medicines will not shorten the length of time youre sick, but they may be helpful for the following symptoms: cough, sore throat, and nasal and sinus congestion. (Note: Do not use decongestants if you have high blood pressure.)  Follow-up care  Follow up with your healthcare provider, or as advised.  When to seek medical advice  Call your healthcare provider right away if any of these occur:  · Cough with lots of colored sputum (mucus)  · Severe headache; face, neck, or ear pain  · Difficulty swallowing due to throat pain  · Fever of 100.4°F (38°C)  Call 911, or get immediate medical care  Call emergency services right away if any of these occur:  · Chest pain, shortness of breath, wheezing, or difficulty breathing  · Coughing up blood  · Inability to swallow due to throat pain  Date Last Reviewed: 9/13/2015  © 7030-1473 [employer] [address] [place] [address], [place] [address]. All rights reserved. This information is not intended as a substitute for professional medical care. Always follow your healthcare professional's instructions.

## 2020-11-29 NOTE — PATIENT INSTRUCTIONS
Try dayquil for daytime cough symptoms  Continue with codeine cough syrup at night  Follow up with PCP if needed      Viral Upper Respiratory Illness (Adult)  You have a viral upper respiratory illness (URI), which is another term for the common cold. This illness is contagious during the first few days. It is spread through the air by coughing and sneezing. It may also be spread by direct contact (touching the sick person and then touching your own eyes, nose, or mouth). Frequent handwashing will decrease risk of spread. Most viral illnesses go away within 7 to 10 days with rest and simple home remedies. Sometimes the illness may last for several weeks. Antibiotics will not kill a virus, and they are generally not prescribed for this condition.    Home care  · If symptoms are severe, rest at home for the first 2 to 3 days. When you resume activity, don't let yourself get too tired.  · Avoid being exposed to cigarette smoke (yours or others).  · You may use acetaminophen or ibuprofen to control pain and fever, unless another medicine was prescribed. (Note: If you have chronic liver or kidney disease, have ever had a stomach ulcer or gastrointestinal bleeding, or are taking blood-thinning medicines, talk with your healthcare provider before using these medicines.) Aspirin should never be given to anyone under 18 years of age who is ill with a viral infection or fever. It may cause severe liver or brain damage.  · Your appetite may be poor, so a light diet is fine. Avoid dehydration by drinking 6 to 8 glasses of fluids per day (water, soft drinks, juices, tea, or soup). Extra fluids will help loosen secretions in the nose and lungs.  · Over-the-counter cold medicines will not shorten the length of time youre sick, but they may be helpful for the following symptoms: cough, sore throat, and nasal and sinus congestion. (Note: Do not use decongestants if you have high blood pressure.)  Follow-up care  Follow up with  your healthcare provider, or as advised.  When to seek medical advice  Call your healthcare provider right away if any of these occur:  · Cough with lots of colored sputum (mucus)  · Severe headache; face, neck, or ear pain  · Difficulty swallowing due to throat pain  · Fever of 100.4°F (38°C)  Call 911, or get immediate medical care  Call emergency services right away if any of these occur:  · Chest pain, shortness of breath, wheezing, or difficulty breathing  · Coughing up blood  · Inability to swallow due to throat pain  Date Last Reviewed: 9/13/2015  © 6187-6501 [employer]. [address] [place] [address]. All rights reserved. This information is not intended as a substitute for professional medical care. Always follow your healthcare professional's instructions.

## 2021-01-26 ENCOUNTER — OFFICE VISIT (OUTPATIENT)
Dept: URGENT CARE | Facility: CLINIC | Age: 56
End: 2021-01-26
Payer: OTHER GOVERNMENT

## 2021-01-26 VITALS
HEART RATE: 72 BPM | RESPIRATION RATE: 18 BRPM | SYSTOLIC BLOOD PRESSURE: 171 MMHG | HEIGHT: 61 IN | BODY MASS INDEX: 37.76 KG/M2 | TEMPERATURE: 99 F | WEIGHT: 200 LBS | OXYGEN SATURATION: 100 % | DIASTOLIC BLOOD PRESSURE: 89 MMHG

## 2021-01-26 DIAGNOSIS — Z11.59 SCREENING FOR VIRAL DISEASE: ICD-10-CM

## 2021-01-26 DIAGNOSIS — R05.9 COUGH: Primary | ICD-10-CM

## 2021-01-26 DIAGNOSIS — R45.89 ANXIETY ABOUT HEALTH: ICD-10-CM

## 2021-01-26 DIAGNOSIS — R09.82 POST-NASAL DRIP: ICD-10-CM

## 2021-01-26 LAB
CTP QC/QA: YES
SARS-COV-2 RDRP RESP QL NAA+PROBE: NEGATIVE

## 2021-01-26 PROCEDURE — U0002 COVID-19 LAB TEST NON-CDC: HCPCS | Mod: QW,S$GLB,, | Performed by: NURSE PRACTITIONER

## 2021-01-26 PROCEDURE — 99212 PR OFFICE/OUTPT VISIT, EST, LEVL II, 10-19 MIN: ICD-10-PCS | Mod: S$GLB,,, | Performed by: NURSE PRACTITIONER

## 2021-01-26 PROCEDURE — U0002: ICD-10-PCS | Mod: QW,S$GLB,, | Performed by: NURSE PRACTITIONER

## 2021-01-26 PROCEDURE — 99212 OFFICE O/P EST SF 10 MIN: CPT | Mod: S$GLB,,, | Performed by: NURSE PRACTITIONER

## 2021-04-15 ENCOUNTER — PATIENT MESSAGE (OUTPATIENT)
Dept: RESEARCH | Facility: HOSPITAL | Age: 56
End: 2021-04-15

## 2021-05-21 ENCOUNTER — OFFICE VISIT (OUTPATIENT)
Dept: URGENT CARE | Facility: CLINIC | Age: 56
End: 2021-05-21
Payer: OTHER GOVERNMENT

## 2021-05-21 VITALS
TEMPERATURE: 98 F | WEIGHT: 200 LBS | DIASTOLIC BLOOD PRESSURE: 85 MMHG | RESPIRATION RATE: 15 BRPM | HEIGHT: 61 IN | OXYGEN SATURATION: 99 % | HEART RATE: 75 BPM | SYSTOLIC BLOOD PRESSURE: 129 MMHG | BODY MASS INDEX: 37.76 KG/M2

## 2021-05-21 DIAGNOSIS — J34.89 SINUS PRESSURE: ICD-10-CM

## 2021-05-21 DIAGNOSIS — B34.9 ACUTE VIRAL SYNDROME: Primary | ICD-10-CM

## 2021-05-21 DIAGNOSIS — R06.7 SNEEZING: ICD-10-CM

## 2021-05-21 DIAGNOSIS — J02.9 SORE THROAT: ICD-10-CM

## 2021-05-21 LAB
CTP QC/QA: YES
SARS-COV-2 RDRP RESP QL NAA+PROBE: NEGATIVE

## 2021-05-21 PROCEDURE — U0002: ICD-10-PCS | Mod: QW,S$GLB,, | Performed by: NURSE PRACTITIONER

## 2021-05-21 PROCEDURE — 99213 PR OFFICE/OUTPT VISIT, EST, LEVL III, 20-29 MIN: ICD-10-PCS | Mod: S$GLB,,, | Performed by: NURSE PRACTITIONER

## 2021-05-21 PROCEDURE — 99213 OFFICE O/P EST LOW 20 MIN: CPT | Mod: S$GLB,,, | Performed by: NURSE PRACTITIONER

## 2021-05-21 PROCEDURE — U0002 COVID-19 LAB TEST NON-CDC: HCPCS | Mod: QW,S$GLB,, | Performed by: NURSE PRACTITIONER

## 2021-05-21 RX ORDER — AZELASTINE 1 MG/ML
1 SPRAY, METERED NASAL 2 TIMES DAILY PRN
Qty: 30 ML | Refills: 0 | Status: SHIPPED | OUTPATIENT
Start: 2021-05-21 | End: 2022-04-16

## 2021-05-21 RX ORDER — ASPIRIN 81 MG/1
81 TABLET ORAL
COMMUNITY

## 2021-05-21 RX ORDER — VALSARTAN 160 MG/1
TABLET ORAL
Status: ON HOLD | COMMUNITY
End: 2022-04-17 | Stop reason: HOSPADM

## 2021-10-21 ENCOUNTER — CLINICAL SUPPORT (OUTPATIENT)
Dept: URGENT CARE | Facility: CLINIC | Age: 56
End: 2021-10-21
Payer: OTHER GOVERNMENT

## 2021-10-21 DIAGNOSIS — Z20.822 ENCOUNTER FOR LABORATORY TESTING FOR COVID-19 VIRUS: Primary | ICD-10-CM

## 2021-10-21 LAB
CTP QC/QA: YES
SARS-COV-2 RDRP RESP QL NAA+PROBE: NEGATIVE
SARS-COV-2 RNA RESP QL NAA+PROBE: NOT DETECTED

## 2021-10-21 PROCEDURE — U0002 COVID-19 LAB TEST NON-CDC: HCPCS | Mod: QW,S$GLB,, | Performed by: PHYSICIAN ASSISTANT

## 2021-10-21 PROCEDURE — U0003 INFECTIOUS AGENT DETECTION BY NUCLEIC ACID (DNA OR RNA); SEVERE ACUTE RESPIRATORY SYNDROME CORONAVIRUS 2 (SARS-COV-2) (CORONAVIRUS DISEASE [COVID-19]), AMPLIFIED PROBE TECHNIQUE, MAKING USE OF HIGH THROUGHPUT TECHNOLOGIES AS DESCRIBED BY CMS-2020-01-R: HCPCS | Performed by: PHYSICIAN ASSISTANT

## 2021-10-21 PROCEDURE — U0002: ICD-10-PCS | Mod: QW,S$GLB,, | Performed by: PHYSICIAN ASSISTANT

## 2021-10-21 PROCEDURE — U0005 INFEC AGEN DETEC AMPLI PROBE: HCPCS | Performed by: PHYSICIAN ASSISTANT

## 2021-10-22 ENCOUNTER — TELEPHONE (OUTPATIENT)
Dept: URGENT CARE | Facility: CLINIC | Age: 56
End: 2021-10-22

## 2022-04-02 ENCOUNTER — HOSPITAL ENCOUNTER (EMERGENCY)
Facility: HOSPITAL | Age: 57
Discharge: HOME | End: 2022-04-03
Attending: EMERGENCY MEDICINE
Payer: OTHER GOVERNMENT

## 2022-04-02 DIAGNOSIS — I10 ACCELERATED HYPERTENSION: ICD-10-CM

## 2022-04-02 DIAGNOSIS — M54.9 BACK PAIN, UNSPECIFIED BACK LOCATION, UNSPECIFIED BACK PAIN LATERALITY, UNSPECIFIED CHRONICITY: ICD-10-CM

## 2022-04-02 DIAGNOSIS — R10.9 ABDOMINAL PAIN, UNSPECIFIED ABDOMINAL LOCATION: Primary | ICD-10-CM

## 2022-04-02 LAB
ALBUMIN SERPL BCP-MCNC: 3.8 G/DL (ref 3.5–5.2)
ALP SERPL-CCNC: 106 U/L (ref 55–135)
ALT SERPL W/O P-5'-P-CCNC: 27 U/L (ref 10–44)
ANION GAP SERPL CALC-SCNC: 12 MMOL/L (ref 8–16)
AST SERPL-CCNC: 32 U/L (ref 10–40)
BASOPHILS # BLD AUTO: 0.06 K/UL (ref 0–0.2)
BASOPHILS NFR BLD: 0.8 % (ref 0–1.9)
BILIRUB SERPL-MCNC: 0.4 MG/DL (ref 0.1–1)
BILIRUB UR QL STRIP: NEGATIVE
BUN SERPL-MCNC: 12 MG/DL (ref 6–20)
CALCIUM SERPL-MCNC: 9.5 MG/DL (ref 8.7–10.5)
CHLORIDE SERPL-SCNC: 108 MMOL/L (ref 95–110)
CLARITY UR: CLEAR
CO2 SERPL-SCNC: 21 MMOL/L (ref 23–29)
COLOR UR: YELLOW
CREAT SERPL-MCNC: 0.8 MG/DL (ref 0.5–1.4)
DIFFERENTIAL METHOD: ABNORMAL
EOSINOPHIL # BLD AUTO: 0.2 K/UL (ref 0–0.5)
EOSINOPHIL NFR BLD: 2.9 % (ref 0–8)
ERYTHROCYTE [DISTWIDTH] IN BLOOD BY AUTOMATED COUNT: 13.3 % (ref 11.5–14.5)
EST. GFR  (AFRICAN AMERICAN): >60 ML/MIN/1.73 M^2
EST. GFR  (NON AFRICAN AMERICAN): >60 ML/MIN/1.73 M^2
GLUCOSE SERPL-MCNC: 112 MG/DL (ref 70–110)
GLUCOSE UR QL STRIP: NEGATIVE
HCT VFR BLD AUTO: 40.2 % (ref 37–48.5)
HGB BLD-MCNC: 13.1 G/DL (ref 12–16)
HGB UR QL STRIP: NEGATIVE
HYALINE CASTS #/AREA URNS LPF: 1 /LPF
IMM GRANULOCYTES # BLD AUTO: 0.02 K/UL (ref 0–0.04)
IMM GRANULOCYTES NFR BLD AUTO: 0.3 % (ref 0–0.5)
KETONES UR QL STRIP: NEGATIVE
LEUKOCYTE ESTERASE UR QL STRIP: ABNORMAL
LIPASE SERPL-CCNC: 22 U/L (ref 4–60)
LYMPHOCYTES # BLD AUTO: 1.1 K/UL (ref 1–4.8)
LYMPHOCYTES NFR BLD: 15.2 % (ref 18–48)
MCH RBC QN AUTO: 27.9 PG (ref 27–31)
MCHC RBC AUTO-ENTMCNC: 32.6 G/DL (ref 32–36)
MCV RBC AUTO: 86 FL (ref 82–98)
MICROSCOPIC COMMENT: ABNORMAL
MONOCYTES # BLD AUTO: 0.7 K/UL (ref 0.3–1)
MONOCYTES NFR BLD: 9.2 % (ref 4–15)
NEUTROPHILS # BLD AUTO: 5.4 K/UL (ref 1.8–7.7)
NEUTROPHILS NFR BLD: 71.6 % (ref 38–73)
NITRITE UR QL STRIP: NEGATIVE
NON-SQ EPI CELLS #/AREA URNS HPF: 2 /HPF
NRBC BLD-RTO: 0 /100 WBC
PH UR STRIP: 6 [PH] (ref 5–8)
PLATELET # BLD AUTO: 311 K/UL (ref 150–450)
PMV BLD AUTO: 10 FL (ref 9.2–12.9)
POTASSIUM SERPL-SCNC: 3.6 MMOL/L (ref 3.5–5.1)
PROT SERPL-MCNC: 7.1 G/DL (ref 6–8.4)
PROT UR QL STRIP: ABNORMAL
RBC # BLD AUTO: 4.69 M/UL (ref 4–5.4)
RBC #/AREA URNS HPF: 1 /HPF (ref 0–4)
SODIUM SERPL-SCNC: 141 MMOL/L (ref 136–145)
SP GR UR STRIP: >1.03 (ref 1–1.03)
SQUAMOUS #/AREA URNS HPF: 3 /HPF
TROPONIN I SERPL DL<=0.01 NG/ML-MCNC: <0.006 NG/ML (ref 0–0.03)
URN SPEC COLLECT METH UR: ABNORMAL
UROBILINOGEN UR STRIP-ACNC: NEGATIVE EU/DL
WBC # BLD AUTO: 7.48 K/UL (ref 3.9–12.7)
WBC #/AREA URNS HPF: 8 /HPF (ref 0–5)

## 2022-04-02 PROCEDURE — 25000003 PHARM REV CODE 250: Performed by: EMERGENCY MEDICINE

## 2022-04-02 PROCEDURE — 81000 URINALYSIS NONAUTO W/SCOPE: CPT | Performed by: EMERGENCY MEDICINE

## 2022-04-02 PROCEDURE — 63600175 PHARM REV CODE 636 W HCPCS: Performed by: EMERGENCY MEDICINE

## 2022-04-02 PROCEDURE — 93010 ELECTROCARDIOGRAM REPORT: CPT | Mod: ,,, | Performed by: INTERNAL MEDICINE

## 2022-04-02 PROCEDURE — 85025 COMPLETE CBC W/AUTO DIFF WBC: CPT | Performed by: EMERGENCY MEDICINE

## 2022-04-02 PROCEDURE — 25500020 PHARM REV CODE 255: Performed by: EMERGENCY MEDICINE

## 2022-04-02 PROCEDURE — 99285 EMERGENCY DEPT VISIT HI MDM: CPT | Mod: 25

## 2022-04-02 PROCEDURE — 83690 ASSAY OF LIPASE: CPT | Performed by: EMERGENCY MEDICINE

## 2022-04-02 PROCEDURE — 80053 COMPREHEN METABOLIC PANEL: CPT | Performed by: EMERGENCY MEDICINE

## 2022-04-02 PROCEDURE — 84484 ASSAY OF TROPONIN QUANT: CPT | Performed by: EMERGENCY MEDICINE

## 2022-04-02 PROCEDURE — 93005 ELECTROCARDIOGRAM TRACING: CPT

## 2022-04-02 PROCEDURE — 96361 HYDRATE IV INFUSION ADD-ON: CPT

## 2022-04-02 PROCEDURE — 93010 EKG 12-LEAD: ICD-10-PCS | Mod: ,,, | Performed by: INTERNAL MEDICINE

## 2022-04-02 PROCEDURE — 96374 THER/PROPH/DIAG INJ IV PUSH: CPT

## 2022-04-02 RX ORDER — MAG HYDROX/ALUMINUM HYD/SIMETH 200-200-20
30 SUSPENSION, ORAL (FINAL DOSE FORM) ORAL ONCE
Status: COMPLETED | OUTPATIENT
Start: 2022-04-02 | End: 2022-04-02

## 2022-04-02 RX ORDER — PANTOPRAZOLE SODIUM 40 MG/1
40 TABLET, DELAYED RELEASE ORAL 2 TIMES DAILY
Qty: 60 TABLET | Refills: 1 | Status: SHIPPED | OUTPATIENT
Start: 2022-04-02 | End: 2022-06-01

## 2022-04-02 RX ORDER — LIDOCAINE HYDROCHLORIDE 20 MG/ML
10 SOLUTION OROPHARYNGEAL ONCE
Status: COMPLETED | OUTPATIENT
Start: 2022-04-02 | End: 2022-04-02

## 2022-04-02 RX ORDER — HYDROMORPHONE HYDROCHLORIDE 2 MG/ML
1 INJECTION, SOLUTION INTRAMUSCULAR; INTRAVENOUS; SUBCUTANEOUS
Status: COMPLETED | OUTPATIENT
Start: 2022-04-02 | End: 2022-04-02

## 2022-04-02 RX ORDER — AMLODIPINE BESYLATE 5 MG/1
5 TABLET ORAL DAILY
Qty: 30 TABLET | Refills: 0 | Status: SHIPPED | OUTPATIENT
Start: 2022-04-02 | End: 2022-04-16

## 2022-04-02 RX ORDER — SUCRALFATE 1 G/1
1 TABLET ORAL
Qty: 40 TABLET | Refills: 0 | Status: SHIPPED | OUTPATIENT
Start: 2022-04-02 | End: 2022-04-12

## 2022-04-02 RX ADMIN — MAGNESIUM HYDROXIDE/ALUMINUM HYDROXICE/SIMETHICONE 30 ML: 120; 1200; 1200 SUSPENSION ORAL at 09:04

## 2022-04-02 RX ADMIN — HYDROMORPHONE HYDROCHLORIDE 1 MG: 2 INJECTION INTRAMUSCULAR; INTRAVENOUS; SUBCUTANEOUS at 10:04

## 2022-04-02 RX ADMIN — LIDOCAINE HYDROCHLORIDE 10 ML: 20 SOLUTION ORAL; TOPICAL at 09:04

## 2022-04-02 RX ADMIN — SODIUM CHLORIDE 1000 ML: 0.9 INJECTION, SOLUTION INTRAVENOUS at 10:04

## 2022-04-02 RX ADMIN — IOHEXOL 75 ML: 350 INJECTION, SOLUTION INTRAVENOUS at 10:04

## 2022-04-03 ENCOUNTER — NURSE TRIAGE (OUTPATIENT)
Dept: ADMINISTRATIVE | Facility: CLINIC | Age: 57
End: 2022-04-03
Payer: OTHER GOVERNMENT

## 2022-04-03 VITALS
TEMPERATURE: 98 F | DIASTOLIC BLOOD PRESSURE: 80 MMHG | HEART RATE: 83 BPM | OXYGEN SATURATION: 100 % | SYSTOLIC BLOOD PRESSURE: 127 MMHG | RESPIRATION RATE: 15 BRPM | WEIGHT: 195 LBS | HEIGHT: 61 IN | BODY MASS INDEX: 36.82 KG/M2

## 2022-04-03 VITALS
HEART RATE: 69 BPM | RESPIRATION RATE: 20 BRPM | SYSTOLIC BLOOD PRESSURE: 135 MMHG | TEMPERATURE: 98 F | OXYGEN SATURATION: 99 % | DIASTOLIC BLOOD PRESSURE: 83 MMHG | WEIGHT: 200 LBS | BODY MASS INDEX: 33.32 KG/M2 | HEIGHT: 65 IN

## 2022-04-03 DIAGNOSIS — R10.9 ABDOMINAL PAIN: Primary | ICD-10-CM

## 2022-04-03 DIAGNOSIS — R10.11 RUQ PAIN: ICD-10-CM

## 2022-04-03 LAB
ALBUMIN SERPL BCP-MCNC: 4.1 G/DL (ref 3.5–5.2)
ALBUMIN SERPL BCP-MCNC: 4.1 G/DL (ref 3.5–5.2)
ALP SERPL-CCNC: 105 U/L (ref 55–135)
ALP SERPL-CCNC: 105 U/L (ref 55–135)
ALT SERPL W/O P-5'-P-CCNC: 37 U/L (ref 10–44)
ALT SERPL W/O P-5'-P-CCNC: 37 U/L (ref 10–44)
ANION GAP SERPL CALC-SCNC: 10 MMOL/L (ref 8–16)
AST SERPL-CCNC: 37 U/L (ref 10–40)
AST SERPL-CCNC: 37 U/L (ref 10–40)
BASOPHILS # BLD AUTO: 0.06 K/UL (ref 0–0.2)
BASOPHILS NFR BLD: 0.8 % (ref 0–1.9)
BILIRUB DIRECT SERPL-MCNC: 0.5 MG/DL (ref 0.1–0.3)
BILIRUB SERPL-MCNC: 1 MG/DL (ref 0.1–1)
BILIRUB SERPL-MCNC: 1 MG/DL (ref 0.1–1)
BUN SERPL-MCNC: 8 MG/DL (ref 6–20)
CALCIUM SERPL-MCNC: 9.1 MG/DL (ref 8.7–10.5)
CHLORIDE SERPL-SCNC: 103 MMOL/L (ref 95–110)
CO2 SERPL-SCNC: 24 MMOL/L (ref 23–29)
CREAT SERPL-MCNC: 0.7 MG/DL (ref 0.5–1.4)
DIFFERENTIAL METHOD: ABNORMAL
EOSINOPHIL # BLD AUTO: 0.1 K/UL (ref 0–0.5)
EOSINOPHIL NFR BLD: 1.2 % (ref 0–8)
ERYTHROCYTE [DISTWIDTH] IN BLOOD BY AUTOMATED COUNT: 13.3 % (ref 11.5–14.5)
EST. GFR  (AFRICAN AMERICAN): >60 ML/MIN/1.73 M^2
EST. GFR  (NON AFRICAN AMERICAN): >60 ML/MIN/1.73 M^2
GLUCOSE SERPL-MCNC: 109 MG/DL (ref 70–110)
HCT VFR BLD AUTO: 42.4 % (ref 37–48.5)
HGB BLD-MCNC: 13.8 G/DL (ref 12–16)
IMM GRANULOCYTES # BLD AUTO: 0.02 K/UL (ref 0–0.04)
IMM GRANULOCYTES NFR BLD AUTO: 0.3 % (ref 0–0.5)
INR PPP: 1 (ref 0.8–1.2)
LIPASE SERPL-CCNC: 13 U/L (ref 4–60)
LYMPHOCYTES # BLD AUTO: 0.5 K/UL (ref 1–4.8)
LYMPHOCYTES NFR BLD: 7.5 % (ref 18–48)
MCH RBC QN AUTO: 28 PG (ref 27–31)
MCHC RBC AUTO-ENTMCNC: 32.5 G/DL (ref 32–36)
MCV RBC AUTO: 86 FL (ref 82–98)
MONOCYTES # BLD AUTO: 0.4 K/UL (ref 0.3–1)
MONOCYTES NFR BLD: 5.4 % (ref 4–15)
NEUTROPHILS # BLD AUTO: 6.1 K/UL (ref 1.8–7.7)
NEUTROPHILS NFR BLD: 84.8 % (ref 38–73)
NRBC BLD-RTO: 0 /100 WBC
PLATELET # BLD AUTO: 296 K/UL (ref 150–450)
PMV BLD AUTO: 9.8 FL (ref 9.2–12.9)
POTASSIUM SERPL-SCNC: 4 MMOL/L (ref 3.5–5.1)
PROT SERPL-MCNC: 7.9 G/DL (ref 6–8.4)
PROT SERPL-MCNC: 7.9 G/DL (ref 6–8.4)
PROTHROMBIN TIME: 10.3 SEC (ref 9–12.5)
RBC # BLD AUTO: 4.92 M/UL (ref 4–5.4)
SODIUM SERPL-SCNC: 137 MMOL/L (ref 136–145)
TROPONIN I SERPL DL<=0.01 NG/ML-MCNC: <0.006 NG/ML (ref 0–0.03)
WBC # BLD AUTO: 7.21 K/UL (ref 3.9–12.7)

## 2022-04-03 PROCEDURE — 96361 HYDRATE IV INFUSION ADD-ON: CPT

## 2022-04-03 PROCEDURE — 63600175 PHARM REV CODE 636 W HCPCS: Performed by: EMERGENCY MEDICINE

## 2022-04-03 PROCEDURE — 25000003 PHARM REV CODE 250: Performed by: EMERGENCY MEDICINE

## 2022-04-03 PROCEDURE — 85610 PROTHROMBIN TIME: CPT | Performed by: EMERGENCY MEDICINE

## 2022-04-03 PROCEDURE — 96375 TX/PRO/DX INJ NEW DRUG ADDON: CPT

## 2022-04-03 PROCEDURE — 96374 THER/PROPH/DIAG INJ IV PUSH: CPT

## 2022-04-03 PROCEDURE — 85025 COMPLETE CBC W/AUTO DIFF WBC: CPT | Performed by: EMERGENCY MEDICINE

## 2022-04-03 PROCEDURE — 80053 COMPREHEN METABOLIC PANEL: CPT | Performed by: EMERGENCY MEDICINE

## 2022-04-03 PROCEDURE — 84484 ASSAY OF TROPONIN QUANT: CPT | Performed by: EMERGENCY MEDICINE

## 2022-04-03 PROCEDURE — 83690 ASSAY OF LIPASE: CPT | Performed by: EMERGENCY MEDICINE

## 2022-04-03 PROCEDURE — 96376 TX/PRO/DX INJ SAME DRUG ADON: CPT

## 2022-04-03 PROCEDURE — 99285 EMERGENCY DEPT VISIT HI MDM: CPT | Mod: 25

## 2022-04-03 RX ORDER — FAMOTIDINE 20 MG/1
20 TABLET, FILM COATED ORAL 2 TIMES DAILY
Qty: 60 TABLET | Refills: 0 | Status: SHIPPED | OUTPATIENT
Start: 2022-04-03 | End: 2022-04-16

## 2022-04-03 RX ORDER — DOCUSATE SODIUM 100 MG/1
100 CAPSULE, LIQUID FILLED ORAL 2 TIMES DAILY
Qty: 60 CAPSULE | Refills: 0 | Status: SHIPPED | OUTPATIENT
Start: 2022-04-03 | End: 2022-04-16

## 2022-04-03 RX ORDER — ONDANSETRON 2 MG/ML
8 INJECTION INTRAMUSCULAR; INTRAVENOUS
Status: COMPLETED | OUTPATIENT
Start: 2022-04-03 | End: 2022-04-03

## 2022-04-03 RX ORDER — OXYCODONE AND ACETAMINOPHEN 5; 325 MG/1; MG/1
1 TABLET ORAL EVERY 6 HOURS PRN
Qty: 12 TABLET | Refills: 0 | Status: SHIPPED | OUTPATIENT
Start: 2022-04-03 | End: 2022-04-16

## 2022-04-03 RX ORDER — HYDROMORPHONE HYDROCHLORIDE 2 MG/ML
1 INJECTION, SOLUTION INTRAMUSCULAR; INTRAVENOUS; SUBCUTANEOUS
Status: COMPLETED | OUTPATIENT
Start: 2022-04-03 | End: 2022-04-03

## 2022-04-03 RX ORDER — HYDROMORPHONE HYDROCHLORIDE 2 MG/ML
0.5 INJECTION, SOLUTION INTRAMUSCULAR; INTRAVENOUS; SUBCUTANEOUS
Status: COMPLETED | OUTPATIENT
Start: 2022-04-03 | End: 2022-04-03

## 2022-04-03 RX ORDER — DIPHENHYDRAMINE HYDROCHLORIDE 50 MG/ML
50 INJECTION INTRAMUSCULAR; INTRAVENOUS
Status: DISCONTINUED | OUTPATIENT
Start: 2022-04-03 | End: 2022-04-03

## 2022-04-03 RX ORDER — AMLODIPINE BESYLATE 5 MG/1
5 TABLET ORAL
Status: COMPLETED | OUTPATIENT
Start: 2022-04-03 | End: 2022-04-03

## 2022-04-03 RX ORDER — ONDANSETRON 8 MG/1
8 TABLET, ORALLY DISINTEGRATING ORAL EVERY 6 HOURS PRN
Qty: 30 TABLET | Refills: 0 | Status: SHIPPED | OUTPATIENT
Start: 2022-04-03 | End: 2022-04-16

## 2022-04-03 RX ADMIN — SODIUM CHLORIDE 1000 ML: 0.9 INJECTION, SOLUTION INTRAVENOUS at 08:04

## 2022-04-03 RX ADMIN — HYDROMORPHONE HYDROCHLORIDE 1 MG: 2 INJECTION INTRAMUSCULAR; INTRAVENOUS; SUBCUTANEOUS at 08:04

## 2022-04-03 RX ADMIN — HYDROMORPHONE HYDROCHLORIDE 0.5 MG: 2 INJECTION INTRAMUSCULAR; INTRAVENOUS; SUBCUTANEOUS at 10:04

## 2022-04-03 RX ADMIN — AMLODIPINE BESYLATE 5 MG: 5 TABLET ORAL at 08:04

## 2022-04-03 RX ADMIN — ONDANSETRON 8 MG: 2 INJECTION INTRAMUSCULAR; INTRAVENOUS at 08:04

## 2022-04-03 NOTE — ED TRIAGE NOTES
Patient presents to ED via personal transportation c/o Nausea/Vomiting and cervical back pain 8/10 Patient seen here in the ED last night for reflux and elevated bp, discharged around midnight. N/V began approx 0400, reports 5 episodes of emesis.     Denies sob, chest pain, HA, fever, chills, diarrhea    AAO x 4.

## 2022-04-03 NOTE — ED PROVIDER NOTES
"Encounter Date: 4/3/2022       History     Chief Complaint   Patient presents with    Nausea    Vomiting     Patient reports was seen last night with refulx, abdominal pain states started to throw up around $am, called on call nurse and was instructed to come back due to a flag in the chart.      56 y.o. female Past Medical History:  No date: Basal cell carcinoma      Comment:  nose will have surgrey June 30  No date: High cholesterol  No date: Hypertension  No date: Thyroid disease  Prior GB removal    Presents c/o epigastric and RUQ pain "feels like it did when they removed my GB", noted to have  Epigastric pain rad to between shoulder blades, described as "burning" does admit to eating ice cream 11pm last night and thinks she is "lactose intolerant", was seen in the ED yesterday     Where she had cbc/cmp/lipase/trop/ua were unremarkable, ct abd/pelvis with contrast read as "No acute abdominal or pelvic pathology CT of the abdomen and pelvis with contrast.  Sigmoid diverticulosis"           Review of patient's allergies indicates:   Allergen Reactions    Monistat soothing care [dimethicone] Itching     Past Medical History:   Diagnosis Date    Basal cell carcinoma     nose will have surgrey June 30    High cholesterol     Hypertension     Thyroid disease      Past Surgical History:   Procedure Laterality Date    ERCP W/ SPHICTEROTOMY N/A 3/21/2019    Procedure: ERCP, WITH SPHINCTEROTOMY;  Surgeon: Layla Carrillo MD;  Location: [facility] ENDO;  Service: Endoscopy;  Laterality: N/A;    LAPAROSCOPIC CHOLECYSTECTOMY N/A 3/25/2019    Procedure: CHOLECYSTECTOMY, LAPAROSCOPIC;  Surgeon: Nish Richter III, MD;  Location: [facility] OR;  Service: General;  Laterality: N/A;    WISDOM TOOTH EXTRACTION       Family History   Problem Relation Age of Onset    Cancer Mother         breast    Diverticulitis Mother     Diverticulitis Father      Social History     Tobacco Use    Smoking status: Never Smoker    Smokeless " tobacco: Never Used   Substance Use Topics    Alcohol use: No    Drug use: No     Review of Systems   Constitutional: Negative for fever.   HENT: Negative for sore throat.    Respiratory: Negative for shortness of breath.    Cardiovascular: Negative for chest pain.   Gastrointestinal: Positive for abdominal pain. Negative for nausea.   Genitourinary: Negative for dysuria.   Musculoskeletal: Negative for back pain.   Skin: Negative for rash.   Neurological: Negative for weakness.   Hematological: Does not bruise/bleed easily.   All other systems reviewed and are negative.      Physical Exam     Initial Vitals [04/03/22 0750]   BP Pulse Resp Temp SpO2   (!) 209/91 77 18 98.2 °F (36.8 °C) 100 %      MAP       --         Physical Exam    Nursing note and vitals reviewed.  Constitutional: She appears well-developed and well-nourished.   HENT:   Head: Normocephalic and atraumatic.   Eyes: Conjunctivae and EOM are normal. Pupils are equal, round, and reactive to light.   Neck:   Normal range of motion.  Cardiovascular: Normal rate.   Pulmonary/Chest: No respiratory distress.   Abdominal: She exhibits no distension.   Musculoskeletal:         General: Normal range of motion.      Cervical back: Normal range of motion.     Neurological: She is alert. No cranial nerve deficit. GCS score is 15. GCS eye subscore is 4. GCS verbal subscore is 5. GCS motor subscore is 6.   Skin: Skin is warm and dry.   Psychiatric: She has a normal mood and affect. Thought content normal.     +epigastric and RUQ ttp     ED Course   Procedures  Labs Reviewed   CBC W/ AUTO DIFFERENTIAL - Abnormal; Notable for the following components:       Result Value    Lymph # 0.5 (*)     Gran % 84.8 (*)     Lymph % 7.5 (*)     All other components within normal limits   HEPATIC FUNCTION PANEL - Abnormal; Notable for the following components:    Bilirubin, Direct 0.5 (*)     All other components within normal limits   COMPREHENSIVE METABOLIC PANEL   TROPONIN I    PROTIME-INR   LIPASE          Imaging Results           US Abdomen Complete (Final result)  Result time 04/03/22 10:47:36    Final result by Mary Quintana MD (04/03/22 10:47:36)                 Impression:      1. Mild hepatomegaly with alternating, geographic areas of fatty infiltration and fatty sparing  2. Cholecystectomy  3. Dilated common bile duct, unchanged.  No intrahepatic biliary dilatation.  This report was flagged in Epic as abnormal.      Electronically signed by: Mary Quintana MD  Date:    04/03/2022  Time:    10:47             Narrative:    EXAMINATION:  US ABDOMEN COMPLETE    CLINICAL HISTORY:  Right upper quadrant pain    TECHNIQUE:  Complete abdominal ultrasound (including pancreas, aorta, liver, gallbladder, common bile duct, IVC, kidneys, and spleen) was performed.    COMPARISON:  None    FINDINGS:  Pancreas: The visualized portions of pancreas appear normal.    Aorta: No aneurysm.    Liver: Mildly enlarged, 16.6 cm.  The echotexture is heterogeneous with alternating, geographic areas of increased and decreased echogenicity, suggesting alternating areas of fatty infiltration and fatty sparing.  No distinct focal mass is seen.    Gallbladder: Surgically absent.    Biliary system: Dilated, 10 mm common bile duct; unchanged since the prior CT of March 21, 2019.  No intrahepatic ductal dilatation.    Inferior vena cava: Normal in appearance.    Right kidney: 9.2 cm. No hydronephrosis or focal masses.    Left kidney: 9.8 cm. No hydronephrosis or focal masses.    Spleen: 11.6 x 4.8 cm.  Normal in size with homogeneous echotexture.    Miscellaneous: No ascites.                                 Medications   HYDROmorphone (PF) injection 1 mg (1 mg Intravenous Given 4/3/22 0818)   ondansetron injection 8 mg (8 mg Intravenous Given 4/3/22 0817)   sodium chloride 0.9% bolus 1,000 mL (1,000 mLs Intravenous New Bag 4/3/22 0826)   amLODIPine tablet 5 mg (5 mg Oral Given 4/3/22 0846)   HYDROmorphone  (PF) injection 0.5 mg (0.5 mg Intravenous Given 4/3/22 1017)         Will refer to GI, manage pain/nausea             I have d/w pt she may need ercp/mrcp or endoscopy to diagnose her condition but her labs are reassuring.    Clinical Impression:   Final diagnoses:  [R10.11] RUQ pain  [R10.9] Abdominal pain (Primary)          ED Disposition Condition    Discharge Stable        ED Prescriptions     Medication Sig Dispense Start Date End Date Auth. Provider    oxyCODONE-acetaminophen (PERCOCET) 5-325 mg per tablet Take 1 tablet by mouth every 6 (six) hours as needed for Pain. 12 tablet 4/3/2022  Hollie Estrada MD    famotidine (PEPCID) 20 MG tablet Take 1 tablet (20 mg total) by mouth 2 (two) times daily. 60 tablet 4/3/2022 4/3/2023 Hollie Estrada MD    docusate sodium (COLACE) 100 MG capsule Take 1 capsule (100 mg total) by mouth 2 (two) times daily. 60 capsule 4/3/2022  Hollie Estrada MD        Follow-up Information     Follow up With Specialties Details Why Contact Info Additional Information    [facility] [place] - Gastro[facility]ology Gastroenterology   [address] [facility] [address]  [facility] [address] [phone] Please park in garage or surface lot and use Medical Office Bldg entrance. Check in at [address]           Hollie Estrada MD  04/03/22 2163       Hollie Estrada MD  04/03/22 1102

## 2022-04-03 NOTE — TELEPHONE ENCOUNTER
"Pt calling and states that she was in ED earlier with abdominal pain and pain in back between her shoulders. States that after discharge she started throwing up and is very nauseated. Protocol reviewed and care advice given. Pt agrees with advice and verbalizes understanding. Instructed to call for questions, concerns or changes.  Reason for Disposition   [1] MODERATE vomiting (e.g., 3 - 5 times/day) AND [2] age > 60    Additional Information   Negative: Shock suspected (e.g., cold/pale/clammy skin, too weak to stand, low BP, rapid pulse)   Negative: Difficult to awaken or acting confused  (e.g., disoriented, slurred speech)   Negative: Sounds like a life-threatening emergency to the triager   Negative: [1] Vomiting AND [2] contains red blood or black ("coffee ground") material  (Exception: few red streaks in vomit that only happened once)   Negative: Severe pain in one eye   Negative: Recent head injury (within last 3 days)   Negative: Recent abdominal injury (within last 3 days)   Negative: [1] Insulin-dependent diabetes (Type I) AND [2] glucose > 400 mg/dl (22 mmol/l)   Negative: [1] SEVERE vomiting (e.g., 6 or more times/day) AND [2] present > 8 hours    Protocols used: ST VOMITING-A-AH    "

## 2022-04-03 NOTE — DISCHARGE INSTRUCTIONS
Thank you for coming to our Emergency Department today. It is important to remember that some problems are difficult to diagnose and may not be found during your Emergency Department visit. Be sure to follow up with your primary care doctor and review all labs/imaging/tests that were performed during this visit with them. Some labs/tests may be outside of the normal range and require non-emergent follow-up and further investigation to help diagnose/exclude/prevent complications or other medical conditions.    If you do not have a primary care doctor, you may contact the one listed on your discharge paperwork or you may also call the [facility] Appointment Desk at [phone] to schedule an appointment and establish care with one. It is important to your health that you have a primary care doctor.    Please take all medications as directed. All medications may potentially have side-effects and it is impossible to predict which medications may give you side-effects or what side-effects (if any) they will give you.. If you feel that you are having a negative effect or side-effect of any medication you should immediately stop taking them and seek medical attention. If you feel that you are having a life-threatening reaction call 911.    Return to the ER with any questions/concerns, new/concerning symptoms, worsening or failure to improve.     Do not drive, swim, climb to height, take a bath or make any important decisions for 24 hours if you have received any pain medications, sedatives or mood altering drugs during your ER visit.        BELOW THIS LINE ONLY APPLIES IF YOU HAVE A COVID TEST PENDING OR IF YOU HAVE BEEN DIAGNOSED WITH COVID:  Please access [facility] to review the results of your test. Until the results of your COVID test return, you should isolate yourself so as not to potentially spread illness to others.   If your COVID test returns positive, you should isolate yourself so as not to spread  illness to others. After five full days, if you are feeling better and you have not had fever for 24 hours, you can return to your typical daily activities, but you must wear a mask around others for an additional 5 days.   If your COVID test returns negative and you are either unvaccinated or more than six months out from your two-dose vaccine and are not yet boosted, you should still quarantine for 5 full days followed by strict mask use for an additional 5 full days.   If your COVID test returns negative and you have received your 2-dose initial vaccine as well as a booster, you should continue strict mask use for 10 full days after the exposure.  For all those exposed, best practice includes a test at day 5 after the exposure. This can be a home test or a test through one of the many testing centers throughout our community.   Masking is always advised to limit the spread of COVID. Cdc.gov is an excellent site to obtain the latest up to date recommendations regarding COVID and COVID testing.     CDC Testing and Quarantine Guidelines for patients with exposure to a known-positive COVID-19 person:  A close exposure is defined as anyone who has had an exposure (masked or unmasked) to a known COVID -19 positive person within 6 feet of someone for a cumulative total of 15 minutes or more over a 24-hour period.   Vaccinated and/or if you recently had a positive covid test within 90 days do NOT need to quarantine after contact with someone who had COVID-19 unless you develop symptoms.   Fully vaccinated people who have not had a positive test within 90 days, should get tested 3-5 days after their exposure, even if they don't have symptoms and wear a mask indoors in public for 14 days following exposure or until their test result is negative.      Unvaccinated and/or NOT had a positive test within 90 days and meet close exposure  You are required by CDC guidelines to quarantine for at least 5 days from time of  exposure followed by 5 days of strict masking. It is recommended, but not required to test after 5 days, unless you develop symptoms, in which case you should test at that time.  If you get tested after 5 days and your test is positive, your 5 day period of isolation starts the day of the positive test.    If your exposure does not meet the above definition, you can return to your normal daily activities to include social distancing, wearing a mask and frequent handwashing.      Here is a link to guidance from the CDC:  https://www.cdc.gov/media/releases/2021/s1227-isolation-quarantine-guidance.html      [facility]  Health Testing Sites:  https://ldh.[place].gov/page/3934      [facility] website with testing locations and guidance:  [email or web address]

## 2022-04-03 NOTE — ED NOTES
Bed: 03main  Expected date:   Expected time:   Means of arrival: Personal Transportation  Comments:

## 2022-04-03 NOTE — ED PROVIDER NOTES
"Encounter Date: 4/2/2022    SCRIBE #1 NOTE: I, Ciera Aguilar, am scribing for, and in the presence of,  Miko Araya MD. I have scribed the following portions of the note - Other sections scribed: HPI, ROS, PE.       History     Chief Complaint   Patient presents with    Abdominal Pain     Pt states that she ate ice cream last night and has been having upper GI and mid-back pain since. Pt has hx of gallbladder removal. Aox4, hypertensive, ambulatory. Pt reports x1 episode of vomiting      Dea Ravi is a 56 y.o. female, with a past medical history of gastroesophageal reflux disease and hypertension, who presents to the ED complaining of epigastric and umbilical abdominal pain, on set 1 AM this morning. Additionally complains of back pain between her shoulder blades, and describes her pain as "burning". Associated symptoms include belching and vomiting, with one episode of emesis prior to arrival. Patient recounts eating ice cream around 11 PM, prior to sleeping and being awoken by her pain. States that she does not usually have ice cream that late, but is unsure if she is lactose intolerant. Patient expresses that her blood pressure normally "runs high". Compliant with valsartan. Pertinent past surgical history includes a Cholecystectomy. She states prior to her gallbladder removal, her pain was "the exact same as this pain". Admits pain is worse when laying down. Reports regular bowel movements. Reports taking tylenol and eating toast to help alleviate symptoms, with little relief. No other exacerbating or alleviating factors. To note, patient reports anal bleeding. She describes a tear in her rectum and claims she is sure it is a hemorrhoid tear. Denies exacerbation of symptoms with movement. Patient denies the usage of cigarettes, drugs and alcohol. Denies acid reflux, shortness of breath, fever, cough, cold, constipation, diarrhea, hematuria or other associated symptoms.    The history is provided " by the patient. No  was used.     Review of patient's allergies indicates:   Allergen Reactions    Monistat soothing care [dimethicone] Itching     Past Medical History:   Diagnosis Date    Basal cell carcinoma     nose will have surgrey June 30    High cholesterol     Hypertension     Thyroid disease      Past Surgical History:   Procedure Laterality Date    ERCP W/ SPHICTEROTOMY N/A 3/21/2019    Procedure: ERCP, WITH SPHINCTEROTOMY;  Surgeon: Layla Carrillo MD;  Location: [facility] ENDO;  Service: Endoscopy;  Laterality: N/A;    LAPAROSCOPIC CHOLECYSTECTOMY N/A 3/25/2019    Procedure: CHOLECYSTECTOMY, LAPAROSCOPIC;  Surgeon: Nish Richter III, MD;  Location: [facility] OR;  Service: General;  Laterality: N/A;    WISDOM TOOTH EXTRACTION       Family History   Problem Relation Age of Onset    Cancer Mother         breast    Diverticulitis Mother     Diverticulitis Father      Social History     Tobacco Use    Smoking status: Never Smoker    Smokeless tobacco: Never Used   Substance Use Topics    Alcohol use: No    Drug use: No     Review of Systems   Constitutional: Negative for fever.   HENT: Negative for sore throat.    Eyes: Negative for visual disturbance.   Respiratory: Negative for cough and shortness of breath.    Cardiovascular: Negative for chest pain.   Gastrointestinal: Positive for abdominal pain (epigastric and umbilical), anal bleeding and vomiting. Negative for constipation and diarrhea.   Genitourinary: Negative for difficulty urinating and hematuria.   Musculoskeletal: Positive for back pain (between shoulder blades).   Skin: Negative for rash.   Neurological: Negative for headaches.       Physical Exam     Initial Vitals [04/02/22 2101]   BP Pulse Resp Temp SpO2   (!) 201/95 72 18 98.4 °F (36.9 °C) 97 %      MAP       --         Physical Exam    Nursing note and vitals reviewed.  Constitutional: She appears well-developed and well-nourished.   HENT:   Head:  Normocephalic and atraumatic.   Eyes: Conjunctivae and EOM are normal.   Neck: Neck supple.   Cardiovascular: Regular rhythm and normal heart sounds. Exam reveals no gallop and no friction rub.    No murmur heard.  Pulmonary/Chest: Breath sounds normal. No respiratory distress. She has no wheezes. She has no rhonchi. She has no rales.   Abdominal: Abdomen is soft. Bowel sounds are normal. There is no abdominal tenderness. There is no rebound and no guarding.   Musculoskeletal:         General: Normal range of motion.      Cervical back: Neck supple.     Neurological: She is alert and oriented to person, place, and time. No cranial nerve deficit.   Skin: No rash noted.         ED Course   Procedures  Labs Reviewed   CBC W/ AUTO DIFFERENTIAL - Abnormal; Notable for the following components:       Result Value    Lymph % 15.2 (*)     All other components within normal limits   COMPREHENSIVE METABOLIC PANEL - Abnormal; Notable for the following components:    CO2 21 (*)     Glucose 112 (*)     All other components within normal limits   URINALYSIS, REFLEX TO URINE CULTURE - Abnormal; Notable for the following components:    Specific Gravity, UA >1.030 (*)     Protein, UA Trace (*)     Leukocytes, UA Trace (*)     All other components within normal limits    Narrative:     Specimen Source->Urine   URINALYSIS MICROSCOPIC - Abnormal; Notable for the following components:    WBC, UA 8 (*)     Non-Squam Epith 2 (*)     All other components within normal limits    Narrative:     Specimen Source->Urine   LIPASE   TROPONIN I          Imaging Results          CT Abdomen Pelvis With Contrast (Final result)  Result time 04/02/22 23:00:27    Final result by Kendal Rivera MD (04/02/22 23:00:27)                 Impression:      No acute abdominal or pelvic pathology CT of the abdomen and pelvis with contrast.    Sigmoid diverticulosis.      Electronically signed by: Kendal Rivera  Date:    04/02/2022  Time:    23:00              Narrative:    EXAMINATION:  CT OF ABDOMEN PELVIS WITH    CLINICAL HISTORY:  Upper GI and mid back pain since eating ice cream last night.  Reports 1 episode of vomiting.  History of GERD and hypertension.    TECHNIQUE:  5 mm enhanced axial images were obtained from the lung bases through the greater trochanters.  Seventy-five mL of Omnipaque 350 was injected.    COMPARISON:  None.    FINDINGS:  The liver, spleen, pancreas, right kidney, and adrenal glands are unremarkable. The gallbladder is surgically absent.  Too small to characterize low attenuation lesion is seen in the left a cortex which is likely a tiny cyst (coronal 116).    There is no definite evidence for abdominal adenopathy or ascites.  A tiny fat containing umbilical hernia is present.    Sigmoid diverticulosis present.  There are no pelvic masses or adenopathy.  A small cyst is seen at the lower uterine segment, which may be a nabothian cyst.  The appendix is not inflamed    There is no free fluid in the pelvis.    There is mild right basilar atelectasis.    Mild levoscoliosis is present.  Mild degenerative changes are present.                                 Medications   aluminum-magnesium hydroxide-simethicone 200-200-20 mg/5 mL suspension 30 mL (30 mLs Oral Given 4/2/22 2151)     And   LIDOcaine HCl 2% oral solution 10 mL (10 mLs Oral Given 4/2/22 2151)   sodium chloride 0.9% bolus 1,000 mL (0 mLs Intravenous Stopped 4/2/22 2336)   HYDROmorphone (PF) injection 1 mg (1 mg Intravenous Given 4/2/22 2238)   iohexoL (OMNIPAQUE 350) injection 75 mL (75 mLs Intravenous Given 4/2/22 2239)     Medical Decision Making:   Clinical Tests:   Lab Tests: Ordered and Reviewed  Medical Tests: Ordered and Reviewed          Scribe Attestation:   Scribe #1: I performed the above scribed service and the documentation accurately describes the services I performed. I attest to the accuracy of the note.                 Clinical Impression:   Final diagnoses:  [R10.9]  Abdominal pain, unspecified abdominal location (Primary)  [M54.9] Back pain, unspecified back location, unspecified back pain laterality, unspecified chronicity  [I10] Accelerated hypertension       I, Miko Araya, personally performed the services described in this documentation. All medical record entries made by the scribe were at my direction and in my presence. I have reviewed the chart and agree that the record reflects my personal performance and is accurate and complete.     ED Disposition Condition    Discharge Stable        ED Prescriptions     Medication Sig Dispense Start Date End Date Auth. Provider    pantoprazole (PROTONIX) 40 MG tablet Take 1 tablet (40 mg total) by mouth 2 (two) times daily. 60 tablet 4/2/2022 6/1/2022 Miko Araya MD    sucralfate (CARAFATE) 1 gram tablet Take 1 tablet (1 g total) by mouth 4 (four) times daily before meals and nightly. for 10 days 40 tablet 4/2/2022 4/12/2022 Miko Araya MD    amLODIPine (NORVASC) 5 MG tablet Take 1 tablet (5 mg total) by mouth once daily. 30 tablet 4/2/2022 4/2/2023 Miko Araya MD        Follow-up Information     Follow up With Specialties Details Why Contact Info    Kerry Arreaga MD Internal Medicine Schedule an appointment as soon as possible for a visit  for close blood pressure recheck. [address]  [address]  [facility]  [place] [id number] [phone]      Louis Salmeron MD Gastroenterology Schedule an appointment as soon as possible for a visit  FOr further GI evaluation [address]  [place] [address]  [phone]      [facility] Emergency Dept Emergency Medicine  As needed, If symptoms worsen [address] [facility] [address] [phone]           Miko Araya MD  04/06/22 4423

## 2022-04-15 ENCOUNTER — HOSPITAL ENCOUNTER (OUTPATIENT)
Facility: HOSPITAL | Age: 57
Discharge: HOME | End: 2022-04-17
Attending: EMERGENCY MEDICINE | Admitting: EMERGENCY MEDICINE
Payer: OTHER GOVERNMENT

## 2022-04-15 DIAGNOSIS — R74.01 TRANSAMINITIS: ICD-10-CM

## 2022-04-15 DIAGNOSIS — R07.9 CHEST PAIN: ICD-10-CM

## 2022-04-15 DIAGNOSIS — R10.11 RIGHT UPPER QUADRANT ABDOMINAL PAIN: Primary | ICD-10-CM

## 2022-04-15 PROCEDURE — 99285 EMERGENCY DEPT VISIT HI MDM: CPT | Mod: 25

## 2022-04-15 PROCEDURE — 96376 TX/PRO/DX INJ SAME DRUG ADON: CPT

## 2022-04-15 PROCEDURE — 96375 TX/PRO/DX INJ NEW DRUG ADDON: CPT

## 2022-04-15 PROCEDURE — 96365 THER/PROPH/DIAG IV INF INIT: CPT

## 2022-04-15 RX ORDER — MORPHINE SULFATE 4 MG/ML
4 INJECTION, SOLUTION INTRAMUSCULAR; INTRAVENOUS
Status: COMPLETED | OUTPATIENT
Start: 2022-04-15 | End: 2022-04-16

## 2022-04-16 PROBLEM — R10.11 RIGHT UPPER QUADRANT ABDOMINAL PAIN: Status: ACTIVE | Noted: 2022-04-16

## 2022-04-16 LAB
ALBUMIN SERPL BCP-MCNC: 3.6 G/DL (ref 3.5–5.2)
ALP SERPL-CCNC: 267 U/L (ref 55–135)
ALT SERPL W/O P-5'-P-CCNC: 701 U/L (ref 10–44)
ANION GAP SERPL CALC-SCNC: 10 MMOL/L (ref 8–16)
AST SERPL-CCNC: 555 U/L (ref 10–40)
BASOPHILS # BLD AUTO: 0.05 K/UL (ref 0–0.2)
BASOPHILS NFR BLD: 1.2 % (ref 0–1.9)
BILIRUB SERPL-MCNC: 2.1 MG/DL (ref 0.1–1)
BILIRUB UR QL STRIP: NEGATIVE
BUN SERPL-MCNC: 9 MG/DL (ref 6–20)
CALCIUM SERPL-MCNC: 9.2 MG/DL (ref 8.7–10.5)
CHLORIDE SERPL-SCNC: 107 MMOL/L (ref 95–110)
CLARITY UR: CLEAR
CO2 SERPL-SCNC: 24 MMOL/L (ref 23–29)
COLOR UR: YELLOW
CREAT SERPL-MCNC: 0.7 MG/DL (ref 0.5–1.4)
CTP QC/QA: YES
DIFFERENTIAL METHOD: ABNORMAL
EOSINOPHIL # BLD AUTO: 0.1 K/UL (ref 0–0.5)
EOSINOPHIL NFR BLD: 3.5 % (ref 0–8)
ERYTHROCYTE [DISTWIDTH] IN BLOOD BY AUTOMATED COUNT: 14 % (ref 11.5–14.5)
EST. GFR  (AFRICAN AMERICAN): >60 ML/MIN/1.73 M^2
EST. GFR  (NON AFRICAN AMERICAN): >60 ML/MIN/1.73 M^2
GLUCOSE SERPL-MCNC: 117 MG/DL (ref 70–110)
GLUCOSE UR QL STRIP: NEGATIVE
HCT VFR BLD AUTO: 38.5 % (ref 37–48.5)
HGB BLD-MCNC: 12.5 G/DL (ref 12–16)
HGB UR QL STRIP: NEGATIVE
IMM GRANULOCYTES # BLD AUTO: 0.02 K/UL (ref 0–0.04)
IMM GRANULOCYTES NFR BLD AUTO: 0.5 % (ref 0–0.5)
KETONES UR QL STRIP: NEGATIVE
LEUKOCYTE ESTERASE UR QL STRIP: NEGATIVE
LIPASE SERPL-CCNC: 42 U/L (ref 4–60)
LYMPHOCYTES # BLD AUTO: 1.2 K/UL (ref 1–4.8)
LYMPHOCYTES NFR BLD: 30.3 % (ref 18–48)
MCH RBC QN AUTO: 27.8 PG (ref 27–31)
MCHC RBC AUTO-ENTMCNC: 32.5 G/DL (ref 32–36)
MCV RBC AUTO: 86 FL (ref 82–98)
MONOCYTES # BLD AUTO: 0.2 K/UL (ref 0.3–1)
MONOCYTES NFR BLD: 4 % (ref 4–15)
NEUTROPHILS # BLD AUTO: 2.4 K/UL (ref 1.8–7.7)
NEUTROPHILS NFR BLD: 60.5 % (ref 38–73)
NITRITE UR QL STRIP: NEGATIVE
NRBC BLD-RTO: 0 /100 WBC
PH UR STRIP: 7 [PH] (ref 5–8)
PLATELET # BLD AUTO: 321 K/UL (ref 150–450)
PMV BLD AUTO: 9.6 FL (ref 9.2–12.9)
POTASSIUM SERPL-SCNC: 3.5 MMOL/L (ref 3.5–5.1)
PROT SERPL-MCNC: 7.3 G/DL (ref 6–8.4)
PROT UR QL STRIP: NEGATIVE
RBC # BLD AUTO: 4.5 M/UL (ref 4–5.4)
SARS-COV-2 RDRP RESP QL NAA+PROBE: NEGATIVE
SODIUM SERPL-SCNC: 141 MMOL/L (ref 136–145)
SP GR UR STRIP: 1.01 (ref 1–1.03)
URN SPEC COLLECT METH UR: NORMAL
UROBILINOGEN UR STRIP-ACNC: NEGATIVE EU/DL
WBC # BLD AUTO: 4.03 K/UL (ref 3.9–12.7)

## 2022-04-16 PROCEDURE — 25500020 PHARM REV CODE 255: Performed by: EMERGENCY MEDICINE

## 2022-04-16 PROCEDURE — 63600175 PHARM REV CODE 636 W HCPCS: Performed by: EMERGENCY MEDICINE

## 2022-04-16 PROCEDURE — 85025 COMPLETE CBC W/AUTO DIFF WBC: CPT | Performed by: EMERGENCY MEDICINE

## 2022-04-16 PROCEDURE — 81003 URINALYSIS AUTO W/O SCOPE: CPT | Performed by: EMERGENCY MEDICINE

## 2022-04-16 PROCEDURE — G0378 HOSPITAL OBSERVATION PER HR: HCPCS

## 2022-04-16 PROCEDURE — 80053 COMPREHEN METABOLIC PANEL: CPT | Performed by: EMERGENCY MEDICINE

## 2022-04-16 PROCEDURE — 63600175 PHARM REV CODE 636 W HCPCS: Performed by: FAMILY MEDICINE

## 2022-04-16 PROCEDURE — 99204 OFFICE O/P NEW MOD 45 MIN: CPT | Mod: ,,, | Performed by: INTERNAL MEDICINE

## 2022-04-16 PROCEDURE — 83690 ASSAY OF LIPASE: CPT | Performed by: EMERGENCY MEDICINE

## 2022-04-16 PROCEDURE — 96376 TX/PRO/DX INJ SAME DRUG ADON: CPT

## 2022-04-16 PROCEDURE — 96372 THER/PROPH/DIAG INJ SC/IM: CPT | Performed by: FAMILY MEDICINE

## 2022-04-16 PROCEDURE — 96361 HYDRATE IV INFUSION ADD-ON: CPT

## 2022-04-16 PROCEDURE — 25000003 PHARM REV CODE 250: Performed by: HOSPITALIST

## 2022-04-16 PROCEDURE — 25000003 PHARM REV CODE 250: Performed by: FAMILY MEDICINE

## 2022-04-16 PROCEDURE — 99204 PR OFFICE/OUTPT VISIT, NEW, LEVL IV, 45-59 MIN: ICD-10-PCS | Mod: ,,, | Performed by: INTERNAL MEDICINE

## 2022-04-16 PROCEDURE — 25000003 PHARM REV CODE 250: Performed by: INTERNAL MEDICINE

## 2022-04-16 PROCEDURE — U0002 COVID-19 LAB TEST NON-CDC: HCPCS | Performed by: FAMILY MEDICINE

## 2022-04-16 RX ORDER — SODIUM CHLORIDE, SODIUM LACTATE, POTASSIUM CHLORIDE, CALCIUM CHLORIDE 600; 310; 30; 20 MG/100ML; MG/100ML; MG/100ML; MG/100ML
INJECTION, SOLUTION INTRAVENOUS CONTINUOUS
Status: DISCONTINUED | OUTPATIENT
Start: 2022-04-16 | End: 2022-04-17 | Stop reason: HOSPADM

## 2022-04-16 RX ORDER — IBUPROFEN 400 MG/1
400 TABLET ORAL EVERY 6 HOURS PRN
Status: DISCONTINUED | OUTPATIENT
Start: 2022-04-16 | End: 2022-04-17 | Stop reason: HOSPADM

## 2022-04-16 RX ORDER — IBUPROFEN 200 MG
16 TABLET ORAL
Status: DISCONTINUED | OUTPATIENT
Start: 2022-04-16 | End: 2022-04-17 | Stop reason: HOSPADM

## 2022-04-16 RX ORDER — DOCUSATE SODIUM 100 MG/1
100 CAPSULE, LIQUID FILLED ORAL 2 TIMES DAILY
Status: DISCONTINUED | OUTPATIENT
Start: 2022-04-16 | End: 2022-04-17 | Stop reason: HOSPADM

## 2022-04-16 RX ORDER — GLUCAGON 1 MG
1 KIT INJECTION
Status: DISCONTINUED | OUTPATIENT
Start: 2022-04-16 | End: 2022-04-17 | Stop reason: HOSPADM

## 2022-04-16 RX ORDER — LANOLIN ALCOHOL/MO/W.PET/CERES
800 CREAM (GRAM) TOPICAL
Status: DISCONTINUED | OUTPATIENT
Start: 2022-04-16 | End: 2022-04-16

## 2022-04-16 RX ORDER — VALSARTAN 80 MG/1
160 TABLET ORAL 2 TIMES DAILY
Status: DISCONTINUED | OUTPATIENT
Start: 2022-04-16 | End: 2022-04-17 | Stop reason: HOSPADM

## 2022-04-16 RX ORDER — PANTOPRAZOLE SODIUM 40 MG/1
40 TABLET, DELAYED RELEASE ORAL 2 TIMES DAILY
Status: DISCONTINUED | OUTPATIENT
Start: 2022-04-16 | End: 2022-04-17 | Stop reason: HOSPADM

## 2022-04-16 RX ORDER — MORPHINE SULFATE 4 MG/ML
4 INJECTION, SOLUTION INTRAMUSCULAR; INTRAVENOUS EVERY 4 HOURS PRN
Status: DISCONTINUED | OUTPATIENT
Start: 2022-04-16 | End: 2022-04-17 | Stop reason: HOSPADM

## 2022-04-16 RX ORDER — NALOXONE HCL 0.4 MG/ML
0.02 VIAL (ML) INJECTION
Status: DISCONTINUED | OUTPATIENT
Start: 2022-04-16 | End: 2022-04-17 | Stop reason: HOSPADM

## 2022-04-16 RX ORDER — MORPHINE SULFATE 4 MG/ML
4 INJECTION, SOLUTION INTRAMUSCULAR; INTRAVENOUS
Status: COMPLETED | OUTPATIENT
Start: 2022-04-16 | End: 2022-04-16

## 2022-04-16 RX ORDER — ENOXAPARIN SODIUM 100 MG/ML
40 INJECTION SUBCUTANEOUS EVERY 24 HOURS
Status: DISCONTINUED | OUTPATIENT
Start: 2022-04-16 | End: 2022-04-17 | Stop reason: HOSPADM

## 2022-04-16 RX ORDER — IBUPROFEN 200 MG
24 TABLET ORAL
Status: DISCONTINUED | OUTPATIENT
Start: 2022-04-16 | End: 2022-04-17 | Stop reason: HOSPADM

## 2022-04-16 RX ORDER — OXYCODONE HYDROCHLORIDE 5 MG/1
5 TABLET ORAL EVERY 6 HOURS PRN
Status: DISCONTINUED | OUTPATIENT
Start: 2022-04-16 | End: 2022-04-17 | Stop reason: HOSPADM

## 2022-04-16 RX ORDER — ONDANSETRON 2 MG/ML
4 INJECTION INTRAMUSCULAR; INTRAVENOUS EVERY 8 HOURS PRN
Status: DISCONTINUED | OUTPATIENT
Start: 2022-04-16 | End: 2022-04-17 | Stop reason: HOSPADM

## 2022-04-16 RX ORDER — PROCHLORPERAZINE EDISYLATE 5 MG/ML
5 INJECTION INTRAMUSCULAR; INTRAVENOUS EVERY 6 HOURS PRN
Status: DISCONTINUED | OUTPATIENT
Start: 2022-04-16 | End: 2022-04-17 | Stop reason: HOSPADM

## 2022-04-16 RX ORDER — ASPIRIN 81 MG/1
81 TABLET ORAL DAILY
Status: DISCONTINUED | OUTPATIENT
Start: 2022-04-16 | End: 2022-04-17 | Stop reason: HOSPADM

## 2022-04-16 RX ORDER — POLYETHYLENE GLYCOL 3350 17 G/17G
17 POWDER, FOR SOLUTION ORAL DAILY
Status: DISCONTINUED | OUTPATIENT
Start: 2022-04-16 | End: 2022-04-17 | Stop reason: HOSPADM

## 2022-04-16 RX ORDER — SODIUM CHLORIDE 0.9 % (FLUSH) 0.9 %
10 SYRINGE (ML) INJECTION EVERY 12 HOURS PRN
Status: DISCONTINUED | OUTPATIENT
Start: 2022-04-16 | End: 2022-04-17 | Stop reason: HOSPADM

## 2022-04-16 RX ORDER — TALC
6 POWDER (GRAM) TOPICAL NIGHTLY PRN
Status: DISCONTINUED | OUTPATIENT
Start: 2022-04-16 | End: 2022-04-17 | Stop reason: HOSPADM

## 2022-04-16 RX ADMIN — PANTOPRAZOLE SODIUM 40 MG: 40 TABLET, DELAYED RELEASE ORAL at 09:04

## 2022-04-16 RX ADMIN — DOCUSATE SODIUM 100 MG: 100 CAPSULE, LIQUID FILLED ORAL at 08:04

## 2022-04-16 RX ADMIN — IOHEXOL 75 ML: 350 INJECTION, SOLUTION INTRAVENOUS at 02:04

## 2022-04-16 RX ADMIN — VALSARTAN 160 MG: 80 TABLET, FILM COATED ORAL at 09:04

## 2022-04-16 RX ADMIN — ASPIRIN 81 MG: 81 TABLET, COATED ORAL at 09:04

## 2022-04-16 RX ADMIN — SODIUM CHLORIDE, SODIUM LACTATE, POTASSIUM CHLORIDE, AND CALCIUM CHLORIDE: .6; .31; .03; .02 INJECTION, SOLUTION INTRAVENOUS at 05:04

## 2022-04-16 RX ADMIN — ENOXAPARIN SODIUM 40 MG: 100 INJECTION SUBCUTANEOUS at 05:04

## 2022-04-16 RX ADMIN — MORPHINE SULFATE 4 MG: 4 INJECTION INTRAVENOUS at 02:04

## 2022-04-16 RX ADMIN — MORPHINE SULFATE 4 MG: 4 INJECTION INTRAVENOUS at 10:04

## 2022-04-16 RX ADMIN — PANTOPRAZOLE SODIUM 40 MG: 40 TABLET, DELAYED RELEASE ORAL at 08:04

## 2022-04-16 RX ADMIN — VALSARTAN 160 MG: 80 TABLET, FILM COATED ORAL at 08:04

## 2022-04-16 RX ADMIN — MORPHINE SULFATE 4 MG: 4 INJECTION INTRAVENOUS at 01:04

## 2022-04-16 RX ADMIN — PIPERACILLIN AND TAZOBACTAM 4.5 G: 4; .5 INJECTION, POWDER, LYOPHILIZED, FOR SOLUTION INTRAVENOUS; PARENTERAL at 04:04

## 2022-04-16 RX ADMIN — MORPHINE SULFATE 4 MG: 4 INJECTION INTRAVENOUS at 05:04

## 2022-04-16 RX ADMIN — POLYETHYLENE GLYCOL 3350 17 G: 17 POWDER, FOR SOLUTION ORAL at 09:04

## 2022-04-16 NOTE — CONSULTS
[facility]    Reason for Consult:      PCP:   Kerry Arreaga   No address on file    LOS: 0        Initial History of Present Illness (HPI):  This is a 56 y.o. female consulted for RUQ pain.  Had ERCP with Dr johnson 2 days ago, went home but then had sharp pain in RUQ and came back in. Stonew were removed per outside chart review        ROS:  Constitutional: No fevers, chills, No weight loss  ENT: No allergies  CV: No chest pain  Pulm: No cough, No shortness of breath  Ophtho: No vision changes  GI: see HPI  Derm: No rash  Heme: No lymphadenopathy, No bruising  MSK: No arthritis  : No dysuria, No hematuria  Endo: No hot or cold intolerance  Neuro: No syncope, No seizure  Psych: No anxiety, No depression    Medical History:  has a past medical history of Basal cell carcinoma, High cholesterol, Hypertension, and Thyroid disease.    Surgical History:  has a past surgical history that includes [place] tooth extraction; Laparoscopic cholecystectomy (N/A, 03/25/2019); ERCP w/ sphicterotomy (N/A, 03/21/2019); and Shoulder open rotator cuff repair (Right).    Family History: family history includes Cancer in her mother; Diverticulitis in her father and mother..     Social History:  reports that she has never smoked. She has never used smokeless tobacco. She reports that she does not drink alcohol and does not use drugs.    Review of patient's allergies indicates:  No Known Allergies    No current facility-administered medications on file prior to encounter.     Current Outpatient Medications on File Prior to Encounter   Medication Sig Dispense Refill    aspirin (ECOTRIN) 81 MG EC tablet Take 81 mg by mouth.      pantoprazole (PROTONIX) 40 MG tablet Take 1 tablet (40 mg total) by mouth 2 (two) times daily. 60 tablet 1    valsartan (DIOVAN) 160 MG tablet 1 tab(s)      valsartan (DIOVAN) 80 MG tablet Take 160 mg by mouth 2 (two) times daily.       [DISCONTINUED] albuterol 90  "mcg/actuation inhaler Inhale 2 puffs into the lungs every 6 (six) hours as needed for Wheezing. Rescue (Patient not taking: No sig reported) 6.7 g 0    [DISCONTINUED] amLODIPine (NORVASC) 5 MG tablet Take 1 tablet (5 mg total) by mouth once daily. 30 tablet 0    [DISCONTINUED] azelastine (ASTELIN) 137 mcg (0.1 %) nasal spray 1 spray (137 mcg total) by Nasal route 2 (two) times daily as needed for Rhinitis. 30 mL 0    [DISCONTINUED] docusate sodium (COLACE) 100 MG capsule Take 1 capsule (100 mg total) by mouth 2 (two) times daily. 60 capsule 0    [DISCONTINUED] famotidine (PEPCID) 20 MG tablet Take 1 tablet (20 mg total) by mouth 2 (two) times daily. 60 tablet 0    [DISCONTINUED] fluticasone propionate (FLONASE) 50 mcg/actuation nasal spray SHAKE LIQUID AND USE 1 SPRAY(50 MCG) IN EACH NOSTRIL TWICE DAILY (Patient not taking: No sig reported) 48 g 0    [DISCONTINUED] ondansetron (ZOFRAN-ODT) 8 MG TbDL Take 1 tablet (8 mg total) by mouth every 6 (six) hours as needed (n/v). 30 tablet 0    [DISCONTINUED] oxyCODONE-acetaminophen (PERCOCET) 5-325 mg per tablet Take 1 tablet by mouth every 6 (six) hours as needed for Pain. 12 tablet 0        Objective Findings:    Vital Signs:  /77 (BP Location: Right arm, Patient Position: Lying)   Pulse 63   Temp 97.7 °F (36.5 °C) (Oral)   Resp 18   Ht 5' 1" (1.549 m)   Wt 88.5 kg (195 lb)   SpO2 96%   Breastfeeding No   BMI 36.84 kg/m²   Body mass index is 36.84 kg/m².    Physical Exam:  General Appearance: Well appearing in no acute distress  Head:   Normocephalic, without obvious abnormality  Eyes:    No scleral icterus, EOMI  ENT: Neck supple, Lips, mucosa, and tongue normal; teeth and gums normal  Lungs: CTA bilaterally in anterior and posterior fields, no wheezes, no crackles.  Heart:  Regular rate and rhythm, S1, S2 normal, no murmurs heard  Abdomen: Soft, mild discomfort with palpation in RUQ, non distended with positive bowel sounds in all four quadrants. No " "hepatosplenomegaly, ascites, or mass  Extremities: 2+ pulses, no clubbing, cyanosis or edema  Skin: No rash  Neurologic: CN II-XII intact      Labs:  Lab Results   Component Value Date    WBC 4.03 04/16/2022    HGB 12.5 04/16/2022    HCT 38.5 04/16/2022     04/16/2022    CHOL 250 (H) 03/21/2019    TRIG 139 03/21/2019    HDL 69 03/21/2019     (H) 04/16/2022     (H) 04/16/2022     04/16/2022    K 3.5 04/16/2022     04/16/2022    CREATININE 0.7 04/16/2022    BUN 9 04/16/2022    CO2 24 04/16/2022    INR 1.0 04/03/2022    HGBA1C 5.1 10/20/2020       No results found for: HPYLORINTERP  No results found for: HPYLORIANTIG        Imaging:   Cholecystectomy with extrahepatic biliary ductal dilatation with the common bile duct measuring proximally 0.9-1.0 cm and slight central intrahepatic biliary ductal dilatation in this patient with reported recent sphincterotomy and ERCP at outside facility, noting these procedures are documented in the "care everywhere" portion of the epic electronic medical record.  Correlation with symptomatology, lab values and further evaluation with MRCP/ERCP can be performed as warranted.  2. Findings suggestive of hepatic steatosis.  Region of increased enhancement in the inferior right hepatic lobe likely relating to transient hepatic attenuation difference.  Subcentimeter left hepatic hypodensity too small to definitively characterize.  Further evaluation with MRI or triple phase liver CT as warranted.    Endoscopy:          Assessment:  1. Chest pain    2. Right upper quadrant abdominal pain    3. Transaminitis       No signs of post ERCP pancreatitis or perforation.  Transient bump in LFT's to be expected after biliary manipulation.  Outside films confirm clearance of CBD stones    Recommendations:  1. Continue supportive care with pain control  2. Trend LFT's daily  3. Start miralax as she feels constipated      Thank you so much for allowing me to participate " in the care of Dea Myles MD

## 2022-04-16 NOTE — PLAN OF CARE
Pt alert able to make needs known, tolerates medications well by mouth, IV fluids remains in progress, no signs or symptoms of adverse reactions noted, repositioned self q 2hrs, pain controlled by PRN pain medication, Pt states pain has significantly decreased. Diet progressed to low fiber/residue diet, pt tolerates diet well with no increase in pain, nausea or vomiting, plan of care explained,, remains free from falls and pressure injuries, safety maintained.    Problem: Adult Inpatient Plan of Care  Goal: Plan of Care Review  Outcome: Ongoing, Progressing  Goal: Patient-Specific Goal (Individualized)  Outcome: Ongoing, Progressing  Goal: Absence of Hospital-Acquired Illness or Injury  Outcome: Ongoing, Progressing  Goal: Optimal Comfort and Wellbeing  Outcome: Ongoing, Progressing  Goal: Readiness for Transition of Care  Outcome: Ongoing, Progressing

## 2022-04-16 NOTE — ASSESSMENT & PLAN NOTE
Restart home medications  Elevated blood pressure likely because she missed evening dose medication

## 2022-04-16 NOTE — SUBJECTIVE & OBJECTIVE
Past Medical History:   Diagnosis Date    Basal cell carcinoma     nose will have surgrey June 30    High cholesterol     Hypertension     Thyroid disease        Past Surgical History:   Procedure Laterality Date    ERCP W/ SPHICTEROTOMY N/A 03/21/2019    Procedure: ERCP, WITH SPHINCTEROTOMY;  Surgeon: Layla Carrillo MD;  Location: [facility] ENDO;  Service: Endoscopy;  Laterality: N/A;    LAPAROSCOPIC CHOLECYSTECTOMY N/A 03/25/2019    Procedure: CHOLECYSTECTOMY, LAPAROSCOPIC;  Surgeon: Nish Richter III, MD;  Location: [facility] OR;  Service: General;  Laterality: N/A;    SHOULDER OPEN ROTATOR CUFF REPAIR Right     WISDOM TOOTH EXTRACTION         Review of patient's allergies indicates:  No Known Allergies    No current facility-administered medications on file prior to encounter.     Current Outpatient Medications on File Prior to Encounter   Medication Sig    aspirin (ECOTRIN) 81 MG EC tablet Take 81 mg by mouth.    pantoprazole (PROTONIX) 40 MG tablet Take 1 tablet (40 mg total) by mouth 2 (two) times daily.    valsartan (DIOVAN) 160 MG tablet 1 tab(s)    valsartan (DIOVAN) 80 MG tablet Take 160 mg by mouth 2 (two) times daily.     [DISCONTINUED] albuterol 90 mcg/actuation inhaler Inhale 2 puffs into the lungs every 6 (six) hours as needed for Wheezing. Rescue (Patient not taking: No sig reported)    [DISCONTINUED] amLODIPine (NORVASC) 5 MG tablet Take 1 tablet (5 mg total) by mouth once daily.    [DISCONTINUED] azelastine (ASTELIN) 137 mcg (0.1 %) nasal spray 1 spray (137 mcg total) by Nasal route 2 (two) times daily as needed for Rhinitis.    [DISCONTINUED] docusate sodium (COLACE) 100 MG capsule Take 1 capsule (100 mg total) by mouth 2 (two) times daily.    [DISCONTINUED] famotidine (PEPCID) 20 MG tablet Take 1 tablet (20 mg total) by mouth 2 (two) times daily.    [DISCONTINUED] fluticasone propionate (FLONASE) 50 mcg/actuation nasal spray SHAKE LIQUID AND USE 1 SPRAY(50 MCG) IN EACH NOSTRIL TWICE DAILY (Patient  not taking: No sig reported)    [DISCONTINUED] ondansetron (ZOFRAN-ODT) 8 MG TbDL Take 1 tablet (8 mg total) by mouth every 6 (six) hours as needed (n/v).    [DISCONTINUED] oxyCODONE-acetaminophen (PERCOCET) 5-325 mg per tablet Take 1 tablet by mouth every 6 (six) hours as needed for Pain.     Family History       Problem Relation (Age of Onset)    Cancer Mother    Diverticulitis Mother, Father          Tobacco Use    Smoking status: Never Smoker    Smokeless tobacco: Never Used   Substance and Sexual Activity    Alcohol use: No    Drug use: No    Sexual activity: Not on file     Review of Systems  All systems reviewed and negative except as indicated in HPI  Objective:     Vital Signs (Most Recent):  Temp: (P) 98.4 °F (36.9 °C) (04/15/22 2330)  Pulse: 63 (04/16/22 0233)  Resp: 17 (04/16/22 0233)  BP: (!) 159/71 (04/16/22 0233)  SpO2: 98 % (04/16/22 0233)   Vital Signs (24h Range):  Temp:  [98.1 °F (36.7 °C)-98.4 °F (36.9 °C)] (P) 98.4 °F (36.9 °C)  Pulse:  [63-76] 63  Resp:  [14-18] 17  SpO2:  [98 %-99 %] 98 %  BP: (154-210)/() 159/71     Weight: 88.5 kg (195 lb)  Body mass index is 36.84 kg/m².    Physical Exam   General:  Alert and oriented x4.  No acute distress  HEENT:  Normocephalic  Cardiovascular:  Regular rate and rhythm, no murmurs rubs or gallops.  No lower extremity edema.  Pulmonary:  Clear to auscultation bilaterally  Abdomen:  Soft, nontender, nondistended.  No guarding.  No rebound.  Negative Clifford's.  Positive bowel sounds.  Extremity:  Moves all extremities equally.  Dermatology:  No rashes appreciated on exposed skin  Psychiatric:  Normal affect       Significant Labs: All pertinent labs within the past 24 hours have been reviewed.  Recent Lab Results         04/16/22  0128   04/16/22  0052        Albumin 3.6         Alkaline Phosphatase 267                  Anion Gap 10         Appearance, UA   Clear                Baso # 0.05         Basophil % 1.2         Bilirubin (UA)    Negative       BILIRUBIN TOTAL 2.1  Comment: For infants and newborns, interpretation of results should be based  on gestational age, weight and in agreement with clinical  observations.    Premature Infant recommended reference ranges:  Up to 24 hours.............<8.0 mg/dL  Up to 48 hours............<12.0 mg/dL  3-5 days..................<15.0 mg/dL  6-29 days.................<15.0 mg/dL           BUN 9         Calcium 9.2         Chloride 107         CO2 24         Color, UA   Yellow       Creatinine 0.7         Differential Method Automated         eGFR if  >60         eGFR if non  >60  Comment: Calculation used to obtain the estimated glomerular filtration  rate (eGFR) is the CKD-EPI equation.            Eos # 0.1         Eosinophil % 3.5         Glucose 117         Glucose, UA   Negative       Gran # (ANC) 2.4         Gran % 60.5         Hematocrit 38.5         Hemoglobin 12.5         Immature Grans (Abs) 0.02  Comment: Mild elevation in immature granulocytes is non specific and   can be seen in a variety of conditions including stress response,   acute inflammation, trauma and pregnancy. Correlation with other   laboratory and clinical findings is essential.           Immature Granulocytes 0.5         Ketones, UA   Negative       Leukocytes, UA   Negative       Lipase 42         Lymph # 1.2         Lymph % 30.3         MCH 27.8         MCHC 32.5         MCV 86         Mono # 0.2         Mono % 4.0         MPV 9.6         NITRITE UA   Negative       nRBC 0         Occult Blood UA   Negative       pH, UA   7.0       Platelets 321         Potassium 3.5         PROTEIN TOTAL 7.3         Protein, UA   Negative  Comment: Recommend a 24 hour urine protein or a urine   protein/creatinine ratio if globulin induced proteinuria is  clinically suspected.         RBC 4.50         RDW 14.0         Sodium 141         Specific [place], UA   1.010       Specimen UA   Urine, Clean Catch        UROBILINOGEN UA   Negative       WBC 4.03                 Significant Imaging: I have reviewed all pertinent imaging results/findings within the past 24 hours.

## 2022-04-16 NOTE — H&P
"[facility]  [facility] Medicine  History & Physical    Patient Name: Dea Ravi  MRN: [id number]  Patient Class: OP- Observation  Admission Date: 4/15/2022  Attending Physician: No att. providers found   Primary Care Provider: Kerry Arreaga MD         Patient information was obtained from patient, past medical records and ER records.     Subjective:     Principal Problem:Right upper quadrant abdominal pain    Chief Complaint:   Chief Complaint   Patient presents with    Abdominal Pain     Patient presents to ED secondary to abdominal pain. States had gallstones "pushed into intestines yesterday at [facility]." Described pain as "stabbing." Rates pain 9/10. States pain is worse with any movement or breathing. Pt is hypertensive at triage and states "my pressure is always high when I come in here because of my pain."        HPI: 56-year-old female with a medical history significant for hypertension, thyroid disease, hyperlipidemia and recent year CP with sphincterotomy about 10 days ago presents with worsening right upper quadrant pain over past few days.  Pain is 7/10 nonradiating.  Worse with deep breaths and movement.  Pain is sharp and stabbing in nature.  Episode chills and nausea early in day.    No headaches, blurred vision, double vision, sore throat chest pain, palpitations, shortness of breath, dyspnea exertion, vomiting, cough, flank pain, dysuria, polyuria, diarrhea, constipation, melena, hematochezia, edema.    Emergency room evaluation significant for elevated blood pressure, CBC and BMP within normal limits.  Significant transaminitis with , , alk-phos 267, and bilirubin 2.1.  Lipase was negative at 43.    CT abd/pelvis  1. Cholecystectomy with extrahepatic biliary ductal dilatation with the common bile duct measuring proximally 0.9-1.0 cm and slight central intrahepatic biliary ductal dilatation in this patient with reported recent sphincterotomy and ERCP at outside " "facility, noting these procedures are documented in the "care everywhere" portion of the epic electronic medical record.  Correlation with symptomatology, lab values and further evaluation with MRCP/ERCP can be performed as warranted.   2. Findings suggestive of hepatic steatosis.  Region of increased enhancement in the inferior right hepatic lobe likely relating to transient hepatic attenuation difference.  Subcentimeter left hepatic hypodensity too small to definitively characterize.  Further evaluation with MRI or triple phase liver CT as warranted.      Patient was started on Zosyn.  GI consult placed.      Past Medical History:   Diagnosis Date    Basal cell carcinoma     nose will have surgrey June 30    High cholesterol     Hypertension     Thyroid disease        Past Surgical History:   Procedure Laterality Date    ERCP W/ SPHICTEROTOMY N/A 03/21/2019    Procedure: ERCP, WITH SPHINCTEROTOMY;  Surgeon: Layla Carrillo MD;  Location: [facility] ENDO;  Service: Endoscopy;  Laterality: N/A;    LAPAROSCOPIC CHOLECYSTECTOMY N/A 03/25/2019    Procedure: CHOLECYSTECTOMY, LAPAROSCOPIC;  Surgeon: Nish Richter III, MD;  Location: [facility] OR;  Service: General;  Laterality: N/A;    SHOULDER OPEN ROTATOR CUFF REPAIR Right     WISDOM TOOTH EXTRACTION         Review of patient's allergies indicates:  No Known Allergies    No current facility-administered medications on file prior to encounter.     Current Outpatient Medications on File Prior to Encounter   Medication Sig    aspirin (ECOTRIN) 81 MG EC tablet Take 81 mg by mouth.    pantoprazole (PROTONIX) 40 MG tablet Take 1 tablet (40 mg total) by mouth 2 (two) times daily.    valsartan (DIOVAN) 160 MG tablet 1 tab(s)    valsartan (DIOVAN) 80 MG tablet Take 160 mg by mouth 2 (two) times daily.     [DISCONTINUED] albuterol 90 mcg/actuation inhaler Inhale 2 puffs into the lungs every 6 (six) hours as needed for Wheezing. Rescue (Patient not taking: No sig reported) "    [DISCONTINUED] amLODIPine (NORVASC) 5 MG tablet Take 1 tablet (5 mg total) by mouth once daily.    [DISCONTINUED] azelastine (ASTELIN) 137 mcg (0.1 %) nasal spray 1 spray (137 mcg total) by Nasal route 2 (two) times daily as needed for Rhinitis.    [DISCONTINUED] docusate sodium (COLACE) 100 MG capsule Take 1 capsule (100 mg total) by mouth 2 (two) times daily.    [DISCONTINUED] famotidine (PEPCID) 20 MG tablet Take 1 tablet (20 mg total) by mouth 2 (two) times daily.    [DISCONTINUED] fluticasone propionate (FLONASE) 50 mcg/actuation nasal spray SHAKE LIQUID AND USE 1 SPRAY(50 MCG) IN EACH NOSTRIL TWICE DAILY (Patient not taking: No sig reported)    [DISCONTINUED] ondansetron (ZOFRAN-ODT) 8 MG TbDL Take 1 tablet (8 mg total) by mouth every 6 (six) hours as needed (n/v).    [DISCONTINUED] oxyCODONE-acetaminophen (PERCOCET) 5-325 mg per tablet Take 1 tablet by mouth every 6 (six) hours as needed for Pain.     Family History       Problem Relation (Age of Onset)    Cancer Mother    Diverticulitis Mother, Father          Tobacco Use    Smoking status: Never Smoker    Smokeless tobacco: Never Used   Substance and Sexual Activity    Alcohol use: No    Drug use: No    Sexual activity: Not on file     Review of Systems  All systems reviewed and negative except as indicated in HPI  Objective:     Vital Signs (Most Recent):  Temp: (P) 98.4 °F (36.9 °C) (04/15/22 2330)  Pulse: 63 (04/16/22 0233)  Resp: 17 (04/16/22 0233)  BP: (!) 159/71 (04/16/22 0233)  SpO2: 98 % (04/16/22 0233)   Vital Signs (24h Range):  Temp:  [98.1 °F (36.7 °C)-98.4 °F (36.9 °C)] (P) 98.4 °F (36.9 °C)  Pulse:  [63-76] 63  Resp:  [14-18] 17  SpO2:  [98 %-99 %] 98 %  BP: (154-210)/() 159/71     Weight: 88.5 kg (195 lb)  Body mass index is 36.84 kg/m².    Physical Exam   General:  Alert and oriented x4.  No acute distress  HEENT:  Normocephalic  Cardiovascular:  Regular rate and rhythm, no murmurs rubs or gallops.  No lower extremity  edema.  Pulmonary:  Clear to auscultation bilaterally  Abdomen:  Soft, nontender, nondistended.  No guarding.  No rebound.  Negative Clifford's.  Positive bowel sounds.  Extremity:  Moves all extremities equally.  Dermatology:  No rashes appreciated on exposed skin  Psychiatric:  Normal affect       Significant Labs: All pertinent labs within the past 24 hours have been reviewed.  Recent Lab Results         04/16/22  0128   04/16/22  0052        Albumin 3.6         Alkaline Phosphatase 267                  Anion Gap 10         Appearance, UA   Clear                Baso # 0.05         Basophil % 1.2         Bilirubin (UA)   Negative       BILIRUBIN TOTAL 2.1  Comment: For infants and newborns, interpretation of results should be based  on gestational age, weight and in agreement with clinical  observations.    Premature Infant recommended reference ranges:  Up to 24 hours.............<8.0 mg/dL  Up to 48 hours............<12.0 mg/dL  3-5 days..................<15.0 mg/dL  6-29 days.................<15.0 mg/dL           BUN 9         Calcium 9.2         Chloride 107         CO2 24         Color, UA   Yellow       Creatinine 0.7         Differential Method Automated         eGFR if  >60         eGFR if non  >60  Comment: Calculation used to obtain the estimated glomerular filtration  rate (eGFR) is the CKD-EPI equation.            Eos # 0.1         Eosinophil % 3.5         Glucose 117         Glucose, UA   Negative       Gran # (ANC) 2.4         Gran % 60.5         Hematocrit 38.5         Hemoglobin 12.5         Immature Grans (Abs) 0.02  Comment: Mild elevation in immature granulocytes is non specific and   can be seen in a variety of conditions including stress response,   acute inflammation, trauma and pregnancy. Correlation with other   laboratory and clinical findings is essential.           Immature Granulocytes 0.5         Ketones, UA   Negative       Leukocytes, UA    Negative       Lipase 42         Lymph # 1.2         Lymph % 30.3         MCH 27.8         MCHC 32.5         MCV 86         Mono # 0.2         Mono % 4.0         MPV 9.6         NITRITE UA   Negative       nRBC 0         Occult Blood UA   Negative       pH, UA   7.0       Platelets 321         Potassium 3.5         PROTEIN TOTAL 7.3         Protein, UA   Negative  Comment: Recommend a 24 hour urine protein or a urine   protein/creatinine ratio if globulin induced proteinuria is  clinically suspected.         RBC 4.50         RDW 14.0         Sodium 141         Specific [place], UA   1.010       Specimen UA   Urine, Clean Catch       UROBILINOGEN UA   Negative       WBC 4.03                 Significant Imaging: I have reviewed all pertinent imaging results/findings within the past 24 hours.    Assessment/Plan:     * Right upper quadrant abdominal pain  Given recent ERCP with stones, concern for obstruction of biliary tree.  ERCP versus MRCP  Gastroenterology has been consulted  Will continue Zosyn for now, although currently no strong evidence for ascending cholangitis.      Hyperlipidemia  Given transaminitis, no statin for now.      Essential hypertension  Restart home medications  Elevated blood pressure likely because she missed evening dose medication        VTE Risk Mitigation (From admission, onward)         Ordered     enoxaparin injection 40 mg  Daily         04/16/22 0408     IP VTE HIGH RISK PATIENT  Once         04/16/22 0408     Place sequential compression device  Until discontinued         04/16/22 0408                   London Arroyo MD  Department of Hospital Medicine   [facility] [place] - Emergency Dept

## 2022-04-16 NOTE — CARE UPDATE
56 year old woman with hypertension, hyperthyroidism and hyperlipidemia who presented 2 days s/p ERCP w sphincterotomy and stone removal by Dr. Wilson for evaluation of abdominal pain.  She was found to have a transaminitis which is felt secondary to her procedure and she was placed in observation.  Gi has been consulted and recommend continued monitoring and trending of LFTs.  I have reviewed chart and seen/examined patient.  Pain is presently well controlled and only occurs with deep inspiration.  She feels constipated and we have started colace and miralax.  OK for clear liquids today.  If LFTs any worse tomorrow will plan for MRCP.  No indications of infection at this time so will discontinue antibiotics.  If labs stable/improved and if feeling well can hopefully go home tomorrow.

## 2022-04-16 NOTE — ED PROVIDER NOTES
" Encounter Date: 4/15/2022       History     Chief Complaint   Patient presents with    Abdominal Pain     Patient presents to ED secondary to abdominal pain. States had gallstones "pushed into intestines yesterday at [facility]." Described pain as "stabbing." Rates pain 9/10. States pain is worse with any movement or breathing. Pt is hypertensive at triage and states "my pressure is always high when I come in here because of my pain."     56-year-old female with a history of recent discomfort ultrasound with sphincterotomy presenting with abdominal pain, sharp, stabbing, worse with breathing and movement.  Patient reports pain is in right upper quadrant, worse with movement.  In notes chills and nausea area earlier.  Denies chest pain, shortness of breath, cough, hemoptysis, lower extremity edema, history DVT or PE, diarrhea, constipation.  Otherwise in usual state of health.        Review of patient's allergies indicates:  No Known Allergies  Past Medical History:   Diagnosis Date    Basal cell carcinoma     nose will have surgrey June 30    High cholesterol     Hypertension     Thyroid disease      Past Surgical History:   Procedure Laterality Date    ERCP W/ SPHICTEROTOMY N/A 03/21/2019    Procedure: ERCP, WITH SPHINCTEROTOMY;  Surgeon: Layla Carrillo MD;  Location: [facility] ENDO;  Service: Endoscopy;  Laterality: N/A;    LAPAROSCOPIC CHOLECYSTECTOMY N/A 03/25/2019    Procedure: CHOLECYSTECTOMY, LAPAROSCOPIC;  Surgeon: Nish Richter III, MD;  Location: [facility] OR;  Service: General;  Laterality: N/A;    SHOULDER OPEN ROTATOR CUFF REPAIR Right     WISDOM TOOTH EXTRACTION       Family History   Problem Relation Age of Onset    Cancer Mother         breast    Diverticulitis Mother     Diverticulitis Father      Social History     Tobacco Use    Smoking status: Never Smoker    Smokeless tobacco: Never Used   Substance Use Topics    Alcohol use: No    Drug use: No     Review of Systems   Constitutional: " Negative for chills and fever.   HENT: Negative for sore throat.    Respiratory: Negative for shortness of breath.    Cardiovascular: Negative for chest pain.   Gastrointestinal: Negative for nausea and vomiting.   Genitourinary: Negative for dysuria.   Musculoskeletal: Negative for back pain.   Skin: Negative for rash.   Neurological: Negative for weakness.   Psychiatric/Behavioral: Negative for confusion.       Physical Exam     Initial Vitals [04/15/22 2228]   BP Pulse Resp Temp SpO2   (!) 210/103 67 18 98.1 °F (36.7 °C) 99 %      MAP       --         Physical Exam    Nursing note and vitals reviewed.  Constitutional: She appears well-developed and well-nourished. She is not diaphoretic. No distress.   HENT:   Head: Normocephalic and atraumatic.   Nose: Nose normal.   Eyes: EOM are normal. Pupils are equal, round, and reactive to light. No scleral icterus.   Neck: Neck supple.   Normal range of motion.  Cardiovascular: Normal rate, regular rhythm, normal heart sounds and intact distal pulses. Exam reveals no gallop and no friction rub.    No murmur heard.  Pulmonary/Chest: Breath sounds normal. No stridor. No respiratory distress. She has no wheezes. She has no rhonchi. She has no rales.   Abdominal: Abdomen is soft. Bowel sounds are normal. She exhibits no distension. There is no abdominal tenderness. There is no rebound and no guarding.   Musculoskeletal:         General: No tenderness or edema. Normal range of motion.      Cervical back: Normal range of motion and neck supple.     Neurological: She is alert and oriented to person, place, and time. No cranial nerve deficit. GCS score is 15. GCS eye subscore is 4. GCS verbal subscore is 5. GCS motor subscore is 6.   Skin: Skin is warm and dry. No rash noted.   Psychiatric: She has a normal mood and affect. Her behavior is normal.         ED Course   Procedures  Labs Reviewed   CBC W/ AUTO DIFFERENTIAL - Abnormal; Notable for the following components:        "Result Value    Mono # 0.2 (*)     All other components within normal limits   COMPREHENSIVE METABOLIC PANEL - Abnormal; Notable for the following components:    Glucose 117 (*)     Total Bilirubin 2.1 (*)     Alkaline Phosphatase 267 (*)      (*)      (*)     All other components within normal limits   LIPASE   URINALYSIS, REFLEX TO URINE CULTURE    Narrative:     Specimen Source->Urine   SARS-COV-2 RDRP GENE          Imaging Results          CT Abdomen Pelvis With Contrast (Final result)  Result time 04/16/22 03:03:04    Final result by Carlie Lambert MD (04/16/22 03:03:04)                 Impression:      1. Cholecystectomy with extrahepatic biliary ductal dilatation with the common bile duct measuring proximally 0.9-1.0 cm and slight central intrahepatic biliary ductal dilatation in this patient with reported recent sphincterotomy and ERCP at outside facility, noting these procedures are documented in the "care everywhere" portion of the epic electronic medical record.  Correlation with symptomatology, lab values and further evaluation with MRCP/ERCP can be performed as warranted.  2. Findings suggestive of hepatic steatosis.  Region of increased enhancement in the inferior right hepatic lobe likely relating to transient hepatic attenuation difference.  Subcentimeter left hepatic hypodensity too small to definitively characterize.  Further evaluation with MRI or triple phase liver CT as warranted.  3. Small hiatal hernia.  4. Additional findings as discussed above.      Electronically signed by: Carlie Lambert MD  Date:    04/16/2022  Time:    03:03             Narrative:    EXAMINATION:  CT ABDOMEN PELVIS WITH CONTRAST    CLINICAL HISTORY:  Abdominal pain, post-op;    TECHNIQUE:  Low dose axial images, sagittal and coronal reformations were obtained from the lung bases to the pubic symphysis following the IV administration of 75 mL of Omnipaque 350 .  Oral contrast was not " given.    COMPARISON:  Abdominal ultrasound 04/03/2022, CT abdomen and pelvis 04/02/2022.    FINDINGS:  The visualized lung bases are free of pleural fluid or focal consolidation noting mild dependent bibasilar atelectasis.  The visualized portions of the heart and pericardium are within normal limits.    The liver is hypoattenuating which can be seen with hepatic steatosis.  There is paranasal symmetric enhancement involving the inferior right hepatic lobe which can be seen with transient hepatic attenuation difference.  There is a 8 mm hypodensity in the inferior right hepatic lobe too small to definitively characterize.  The gallbladder is surgically absent.  There is extrahepatic biliary ductal dilatation with the common bile duct measuring approximately 0.9-1.0 cm.  There is mild central intrahepatic biliary ductal dilatation.  The portal vein and splenic vein are patent.  The spleen, pancreas and adrenal glands are unremarkable.    The kidneys are normal in size and location and enhance symmetrically without evidence of hydronephrosis.  There is a subcentimeter left renal cortical hypodensities too small to definitively characterize.  The urinary bladder is distended with smooth margins.  The uterus and adnexal regions appear to be within normal limits.  There is a presumed nabothian cyst present measuring 1.0 cm.    The abdominal aorta is nonaneurysmal with scattered atherosclerosis.  No bulky lymphadenopathy.    There is a small hiatal hernia.  The visualized loops of large and small bowel demonstrate no evidence of obstruction or inflammatory change.  There is scattered colonic diverticula present.  Hyperdensity identified within the transverse colon could relate to prior contrast administration.  There is no free intraperitoneal air, portal venous gas or ascites.  The appendix is unremarkable.    The visualized osseous structures are intact.  Small fat containing umbilical hernia.                                X-Ray Chest PA And Lateral (In process)                  Medications   piperacillin-tazobactam 4.5 g in dextrose 5 % 100 mL IVPB (ready to mix system) (has no administration in time range)   morphine injection 4 mg (4 mg Intravenous Given 4/16/22 0135)   iohexoL (OMNIPAQUE 350) injection 75 mL (75 mLs Intravenous Given 4/16/22 0213)   morphine injection 4 mg (4 mg Intravenous Given 4/16/22 0233)     Medical Decision Making:   Initial Assessment:   57 yo female presenting with abdominal pain. Recent instrumentation with ERCP/EUS yesterday. Noted pain worsening today. On exam, uncomfortable appearing when moving. At rest, improved. Some hypertension. Afebrile. Abdominal relatively non-tender to palpation, non-surgical. +BS. Unclear cause, infection, obstruction, cholangitis. Will get labs, provide analgesia, get imaging, reassess.  ED Management:  Labs reveal significant transaminitis, elevated alk-phos, elevated bilirubin.  CT scan somewhat nonspecific, some ductal dilatation noted.  Cholangitis a consideration, though does not meet all criteria, no white count, no fever.  No altered mental status.  Empiric Zosyn given.  Given significant pain with abnormal lab values in recent instrumentation, patient will be placed observation for further evaluation and treatment.                      Clinical Impression:   Final diagnoses:  [R07.9] Chest pain (Primary)  [R10.11] Right upper quadrant abdominal pain  [R74.01] Transaminitis          ED Disposition Condition    Observation               Narciso Alonso MD  04/16/22 0380

## 2022-04-16 NOTE — ASSESSMENT & PLAN NOTE
Given recent ERCP with stones, concern for obstruction of biliary tree.  ERCP versus MRCP  Gastroenterology has been consulted  Will continue Zosyn for now, although currently no strong evidence for ascending cholangitis.

## 2022-04-16 NOTE — PLAN OF CARE
Problem: Adult Inpatient Plan of Care  Goal: Plan of Care Review  Outcome: Ongoing, Progressing  Goal: Patient-Specific Goal (Individualized)  Outcome: Ongoing, Progressing  Goal: Absence of Hospital-Acquired Illness or Injury  Outcome: Ongoing, Progressing  Goal: Optimal Comfort and Wellbeing  Outcome: Ongoing, Progressing  Goal: Readiness for Transition of Care  Outcome: Ongoing, Progressing       Patient arrived to unit via stretcher.  No acute changes in status.  Patient oriented to room.  Bed locked in lowest position.  Side rails up x2.  Call bell within reach.  Will continue to monitor.

## 2022-04-16 NOTE — ED TRIAGE NOTES
Patient presents to the ED with c/o of 8/10 right flank pain with exertion. Patient is one day post-ERCP.

## 2022-04-16 NOTE — HPI
"56-year-old female with a medical history significant for hypertension, thyroid disease, hyperlipidemia and recent year CP with sphincterotomy about 10 days ago presents with worsening right upper quadrant pain over past few days.  Pain is 7/10 nonradiating.  Worse with deep breaths and movement.  Pain is sharp and stabbing in nature.  Episode chills and nausea early in day.    No headaches, blurred vision, double vision, sore throat chest pain, palpitations, shortness of breath, dyspnea exertion, vomiting, cough, flank pain, dysuria, polyuria, diarrhea, constipation, melena, hematochezia, edema.    Emergency room evaluation significant for elevated blood pressure, CBC and BMP within normal limits.  Significant transaminitis with , , alk-phos 267, and bilirubin 2.1.  Lipase was negative at 43.    CT abd/pelvis  1. Cholecystectomy with extrahepatic biliary ductal dilatation with the common bile duct measuring proximally 0.9-1.0 cm and slight central intrahepatic biliary ductal dilatation in this patient with reported recent sphincterotomy and ERCP at outside facility, noting these procedures are documented in the "care everywhere" portion of the epic electronic medical record.  Correlation with symptomatology, lab values and further evaluation with MRCP/ERCP can be performed as warranted.   2. Findings suggestive of hepatic steatosis.  Region of increased enhancement in the inferior right hepatic lobe likely relating to transient hepatic attenuation difference.  Subcentimeter left hepatic hypodensity too small to definitively characterize.  Further evaluation with MRI or triple phase liver CT as warranted.      Patient was started on Zosyn.  GI consult placed.  "

## 2022-04-17 VITALS
TEMPERATURE: 98 F | OXYGEN SATURATION: 98 % | WEIGHT: 195 LBS | BODY MASS INDEX: 36.82 KG/M2 | DIASTOLIC BLOOD PRESSURE: 67 MMHG | RESPIRATION RATE: 17 BRPM | HEART RATE: 62 BPM | SYSTOLIC BLOOD PRESSURE: 133 MMHG | HEIGHT: 61 IN

## 2022-04-17 LAB
ALBUMIN SERPL BCP-MCNC: 3.5 G/DL (ref 3.5–5.2)
ALP SERPL-CCNC: 233 U/L (ref 55–135)
ALT SERPL W/O P-5'-P-CCNC: 451 U/L (ref 10–44)
ANION GAP SERPL CALC-SCNC: 12 MMOL/L (ref 8–16)
AST SERPL-CCNC: 190 U/L (ref 10–40)
BASOPHILS # BLD AUTO: 0.08 K/UL (ref 0–0.2)
BASOPHILS NFR BLD: 1.8 % (ref 0–1.9)
BILIRUB SERPL-MCNC: 1 MG/DL (ref 0.1–1)
BUN SERPL-MCNC: 6 MG/DL (ref 6–20)
CALCIUM SERPL-MCNC: 9.7 MG/DL (ref 8.7–10.5)
CHLORIDE SERPL-SCNC: 103 MMOL/L (ref 95–110)
CO2 SERPL-SCNC: 25 MMOL/L (ref 23–29)
CREAT SERPL-MCNC: 0.7 MG/DL (ref 0.5–1.4)
DIFFERENTIAL METHOD: ABNORMAL
EOSINOPHIL # BLD AUTO: 0.2 K/UL (ref 0–0.5)
EOSINOPHIL NFR BLD: 5 % (ref 0–8)
ERYTHROCYTE [DISTWIDTH] IN BLOOD BY AUTOMATED COUNT: 14.6 % (ref 11.5–14.5)
EST. GFR  (AFRICAN AMERICAN): >60 ML/MIN/1.73 M^2
EST. GFR  (NON AFRICAN AMERICAN): >60 ML/MIN/1.73 M^2
GLUCOSE SERPL-MCNC: 96 MG/DL (ref 70–110)
HCT VFR BLD AUTO: 42.8 % (ref 37–48.5)
HGB BLD-MCNC: 12.8 G/DL (ref 12–16)
IMM GRANULOCYTES # BLD AUTO: 0.03 K/UL (ref 0–0.04)
IMM GRANULOCYTES NFR BLD AUTO: 0.7 % (ref 0–0.5)
LYMPHOCYTES # BLD AUTO: 1.4 K/UL (ref 1–4.8)
LYMPHOCYTES NFR BLD: 32 % (ref 18–48)
MAGNESIUM SERPL-MCNC: 2.4 MG/DL (ref 1.6–2.6)
MCH RBC QN AUTO: 27.4 PG (ref 27–31)
MCHC RBC AUTO-ENTMCNC: 29.9 G/DL (ref 32–36)
MCV RBC AUTO: 92 FL (ref 82–98)
MONOCYTES # BLD AUTO: 0.4 K/UL (ref 0.3–1)
MONOCYTES NFR BLD: 9.5 % (ref 4–15)
NEUTROPHILS # BLD AUTO: 2.3 K/UL (ref 1.8–7.7)
NEUTROPHILS NFR BLD: 51 % (ref 38–73)
NRBC BLD-RTO: 0 /100 WBC
PLATELET # BLD AUTO: 307 K/UL (ref 150–450)
PMV BLD AUTO: 10.1 FL (ref 9.2–12.9)
POTASSIUM SERPL-SCNC: 3.9 MMOL/L (ref 3.5–5.1)
PROT SERPL-MCNC: 7.1 G/DL (ref 6–8.4)
RBC # BLD AUTO: 4.68 M/UL (ref 4–5.4)
SODIUM SERPL-SCNC: 140 MMOL/L (ref 136–145)
WBC # BLD AUTO: 4.41 K/UL (ref 3.9–12.7)

## 2022-04-17 PROCEDURE — 80053 COMPREHEN METABOLIC PANEL: CPT | Performed by: FAMILY MEDICINE

## 2022-04-17 PROCEDURE — 96361 HYDRATE IV INFUSION ADD-ON: CPT

## 2022-04-17 PROCEDURE — 85025 COMPLETE CBC W/AUTO DIFF WBC: CPT | Performed by: FAMILY MEDICINE

## 2022-04-17 PROCEDURE — 25000003 PHARM REV CODE 250: Performed by: HOSPITALIST

## 2022-04-17 PROCEDURE — 96376 TX/PRO/DX INJ SAME DRUG ADON: CPT

## 2022-04-17 PROCEDURE — 25000003 PHARM REV CODE 250: Performed by: INTERNAL MEDICINE

## 2022-04-17 PROCEDURE — G0378 HOSPITAL OBSERVATION PER HR: HCPCS

## 2022-04-17 PROCEDURE — 36415 COLL VENOUS BLD VENIPUNCTURE: CPT | Performed by: FAMILY MEDICINE

## 2022-04-17 PROCEDURE — 63600175 PHARM REV CODE 636 W HCPCS: Performed by: FAMILY MEDICINE

## 2022-04-17 PROCEDURE — 25000003 PHARM REV CODE 250: Performed by: FAMILY MEDICINE

## 2022-04-17 PROCEDURE — 83735 ASSAY OF MAGNESIUM: CPT | Performed by: FAMILY MEDICINE

## 2022-04-17 RX ORDER — IBUPROFEN 400 MG/1
400 TABLET ORAL EVERY 6 HOURS PRN
Qty: 30 TABLET | Refills: 1 | Status: SHIPPED | OUTPATIENT
Start: 2022-04-17

## 2022-04-17 RX ADMIN — PANTOPRAZOLE SODIUM 40 MG: 40 TABLET, DELAYED RELEASE ORAL at 08:04

## 2022-04-17 RX ADMIN — MORPHINE SULFATE 4 MG: 4 INJECTION INTRAVENOUS at 04:04

## 2022-04-17 RX ADMIN — POLYETHYLENE GLYCOL 3350 17 G: 17 POWDER, FOR SOLUTION ORAL at 08:04

## 2022-04-17 RX ADMIN — SODIUM CHLORIDE, SODIUM LACTATE, POTASSIUM CHLORIDE, AND CALCIUM CHLORIDE: .6; .31; .03; .02 INJECTION, SOLUTION INTRAVENOUS at 10:04

## 2022-04-17 RX ADMIN — IBUPROFEN 400 MG: 400 TABLET, FILM COATED ORAL at 08:04

## 2022-04-17 RX ADMIN — VALSARTAN 160 MG: 80 TABLET, FILM COATED ORAL at 08:04

## 2022-04-17 RX ADMIN — ASPIRIN 81 MG: 81 TABLET, COATED ORAL at 08:04

## 2022-04-17 RX ADMIN — DOCUSATE SODIUM 100 MG: 100 CAPSULE, LIQUID FILLED ORAL at 08:04

## 2022-04-17 NOTE — DISCHARGE INSTRUCTIONS
Take all medications as prescribed.  Eat a strict low salt cardiac diet.  Follow up with your physicians as scheduled - pcp within 1 week.  Thank you for trusting [facility] and Dr. Kraft with your care.  We are honored that you entrusted us with your healthcare needs. Your satisfaction is very important to us and we hope you have been very pleased with your experience at [facility]. After your discharge you may receive a survey asking you to rate your hospital experience. We encourage you to take the time to complete the survey as your feedback allows us to identify areas for improvement as well as recognize our staff.   We hope that you have received the very best care possible during your hospitalization at [facility], as your satisfaction is our top priority.

## 2022-04-17 NOTE — PLAN OF CARE
Problem: Adult Inpatient Plan of Care  Goal: Plan of Care Review  Outcome: Ongoing, Progressing  Goal: Patient-Specific Goal (Individualized)  Outcome: Ongoing, Progressing  Goal: Absence of Hospital-Acquired Illness or Injury  Outcome: Ongoing, Progressing  Goal: Optimal Comfort and Wellbeing  Outcome: Ongoing, Progressing  Goal: Readiness for Transition of Care  Outcome: Ongoing, Progressing       Patient remained free from falls/injury throughout shift.  No acute changes in status.  Bed locked in lowest position.  Side rails up x2.  Call bell within reach.  Purposeful rounding maintained throughout shift.

## 2022-04-17 NOTE — PLAN OF CARE
[facility]  Discharge Final Note    Primary Care Provider: Alek Shaw MD    Expected Discharge Date: 4/17/2022    Final Discharge Note (most recent)       Final Note - 04/17/22 1005          Final Note    Assessment Type Final Discharge Note     Anticipated Discharge Disposition Home or Self Care     What phone number can be called within the next 1-3 days to see how you are doing after discharge? [phone]     Hospital Resources/Appts/Education Provided Post-Acute resouces added to AVS        Post-Acute Status    Discharge Delays None known at this time                     Important Message from Medicare             Contact Info       Alek Shaw MD   Specialty: Internal Medicine   Relationship: PCP - General    [address] [facility] [place] [address]   Phone: [phone]       Next Steps: Schedule an appointment as soon as possible for a visit in 7 day(s)    Instructions: Call your PCP to schedule an appointment to be seen within 7 days for your  hospital followup appointment.    Misael Wilson MD   Specialty: Gastroenterology    [address] [place] [address]  [id number]  [facility]  [place] [address]   Phone: [phone]       Next Steps: Schedule an appointment as soon as possible for a visit    Instructions: Schedule a post op followup visit.        OK for patient to discharge from Case Management standpoint.

## 2022-04-17 NOTE — PLAN OF CARE
[facility]  Discharge Assessment    Primary Care Provider: Alek Shaw MD     Discharge Assessment (most recent)       BRIEF DISCHARGE ASSESSMENT - 04/17/22 1004          Discharge Planning    Assessment Type Discharge Planning Brief Assessment     Resource/Environmental Concerns none     Support Systems Spouse/significant other     Assistance Needed none     Equipment Currently Used at Home none     Current Living Arrangements home/apartment/condo     Patient/Family Anticipates Transition to home     Patient/Family Anticipated Services at Transition none     DME Needed Upon Discharge  none     Discharge Plan A Home     Discharge Plan B Home

## 2022-04-17 NOTE — CONSULTS
Patient will call Dr. Wilson on Monday to schedule post op followup appointment.  Case management unable to schedule PCP appt because office is closed (weekend).  Patient advised to call PCP and schedule appointment to be seen within 7 days.

## 2022-04-17 NOTE — NURSING
Received report from Zehra CRANE. Assuming care of patient. Pt resting in bed with even and unlabored breathing. Pt in no acute distress.

## 2022-04-17 NOTE — NURSING
Discharge instructions. EMS also when to seek additional. Denies pain at this. Transferred to ED to meet ride to home via sari per myself

## 2022-04-18 NOTE — DISCHARGE SUMMARY
"[facility] Medicine  Discharge Summary      Patient Name: Dea Ravi  MRN: [id number]  Patient Class: OP- Observation  Admission Date: 4/15/2022  Hospital Length of Stay: 0 days  Discharge Date and Time: 4/17/2022  2:31 PM  Attending Physician: No att. providers found   Discharging Provider: Nakul Kraft MD  Primary Care Provider: Alek Shaw MD      HPI:   56-year-old female with a medical history significant for hypertension, thyroid disease, hyperlipidemia and recent year CP with sphincterotomy about 10 days ago presents with worsening right upper quadrant pain over past few days.  Pain is 7/10 nonradiating.  Worse with deep breaths and movement.  Pain is sharp and stabbing in nature.  Episode chills and nausea early in day.    No headaches, blurred vision, double vision, sore throat chest pain, palpitations, shortness of breath, dyspnea exertion, vomiting, cough, flank pain, dysuria, polyuria, diarrhea, constipation, melena, hematochezia, edema.    Emergency room evaluation significant for elevated blood pressure, CBC and BMP within normal limits.  Significant transaminitis with , , alk-phos 267, and bilirubin 2.1.  Lipase was negative at 43.    CT abd/pelvis  1. Cholecystectomy with extrahepatic biliary ductal dilatation with the common bile duct measuring proximally 0.9-1.0 cm and slight central intrahepatic biliary ductal dilatation in this patient with reported recent sphincterotomy and ERCP at outside facility, noting these procedures are documented in the "care everywhere" portion of the epic electronic medical record.  Correlation with symptomatology, lab values and further evaluation with MRCP/ERCP can be performed as warranted.   2. Findings suggestive of hepatic steatosis.  Region of increased enhancement in the inferior right hepatic lobe likely relating to transient hepatic attenuation difference.  Subcentimeter left hepatic hypodensity too small to " definitively characterize.  Further evaluation with MRI or triple phase liver CT as warranted.      Patient was started on Zosyn.  GI consult placed.    Goals of Care Treatment Preferences:  Code Status: Full Code    Hospital Course   56 year old woman with hypertension, hyperthyroidism and hyperlipidemia who presented 2 days s/p ERCP w sphincterotomy and stone removal by Dr. Wilson for evaluation of abdominal pain.  She was found to have a transaminitis which is felt secondary to her procedure and she was placed in observation.  Gi has been consulted and recommend continued monitoring and trending of LFTs.  I have reviewed chart and seen/examined patient.  Pain was well controlled and only occurs with deep inspiration.  She feels constipated and we have started colace and miralax.  Clears started and advanced to low residue which she tolerated.  LFTs significantly improved today.  Discussed with GI and OK for discharge home.  No indication for antibiotics so they were discontinued.  Ibuprofen for pain.  Follow up with pcp within 1 week and GI within 2 weeks.    Consults:   Consults (From admission, onward)        Status Ordering Provider     Inpatient consult to Social Work  Once        Provider:  (Not yet assigned)    Completed BERRY VILLAFANA     Inpatient consult to Gastroenterology  Once        Provider:  (Not yet assigned)    Completed SCOTT MARTIN                Final Active Diagnoses:    Diagnosis Date Noted POA    PRINCIPAL PROBLEM:  Right upper quadrant abdominal pain [R10.11] 04/16/2022 Yes    Essential hypertension [I10] 03/21/2019 Yes    Hyperlipidemia [E78.5] 03/21/2019 Yes      Problems Resolved During this Admission:       Discharged Condition: stable    Disposition: Home or Self Care    Follow Up:   Follow-up Information     Alek Shaw MD. Schedule an appointment as soon as possible for a visit in 7 day(s).    Specialty: Internal Medicine  Why: Call your PCP to schedule an appointment to be  seen within 7 days for your  hospital followup appointment.  Contact information:  [address] [facility]  [place] [address] [phone]             Misael Wilson MD. Schedule an appointment as soon as possible for a visit.    Specialty: Gastroenterology  Why: Schedule a post op followup visit.  Contact information:  [address] [place] [address]  [id number]  [facility]  [place] [address] [phone]                       Patient Instructions:      Diet Cardiac     Notify your health care provider if you experience any of the following:  increased confusion or weakness     Notify your health care provider if you experience any of the following:  persistent dizziness, light-headedness, or visual disturbances     Notify your health care provider if you experience any of the following:  worsening rash     Notify your health care provider if you experience any of the following:  severe persistent headache     Notify your health care provider if you experience any of the following:  severe uncontrolled pain     Notify your health care provider if you experience any of the following:  temperature >100.4     Notify your health care provider if you experience any of the following:  persistent nausea and vomiting or diarrhea     Activity as tolerated       Significant Diagnostic Studies: Labs:   BMP:   Recent Labs   Lab 04/16/22 0128 04/17/22 0412   * 96    140   K 3.5 3.9    103   CO2 24 25   BUN 9 6   CREATININE 0.7 0.7   CALCIUM 9.2 9.7   MG  --  2.4   , CMP   Recent Labs   Lab 04/16/22 0128 04/17/22 0412    140   K 3.5 3.9    103   CO2 24 25   * 96   BUN 9 6   CREATININE 0.7 0.7   CALCIUM 9.2 9.7   PROT 7.3 7.1   ALBUMIN 3.6 3.5   BILITOT 2.1* 1.0   ALKPHOS 267* 233*   * 190*   * 451*   ANIONGAP 10 12   ESTGFRAFRICA >60 >60   EGFRNONAA >60 >60   , CBC   Recent Labs   Lab 04/16/22 0128 04/17/22 0412   WBC 4.03 4.41   HGB 12.5 12.8   HCT 38.5  42.8    307   , INR   Lab Results   Component Value Date    INR 1.0 04/03/2022    INR 1.4 (H) 02/10/2018   , Lipid Panel   Lab Results   Component Value Date    CHOL 250 (H) 03/21/2019    HDL 69 03/21/2019    LDLCALC 153.2 03/21/2019    TRIG 139 03/21/2019    CHOLHDL 27.6 03/21/2019   , Troponin No results for input(s): TROPONINI in the last 168 hours. and A1C: No results for input(s): HGBA1C in the last 4320 hours.    Pending Diagnostic Studies:     None         Medications:  Reconciled Home Medications:      Medication List      START taking these medications    ibuprofen 400 MG tablet  Commonly known as: ADVIL,MOTRIN  Take 1 tablet (400 mg total) by mouth every 6 (six) hours as needed (mild pain, headache or fever).        CHANGE how you take these medications    valsartan 80 MG tablet  Commonly known as: DIOVAN  Take 160 mg by mouth 2 (two) times daily.  What changed: Another medication with the same name was removed. Continue taking this medication, and follow the directions you see here.        CONTINUE taking these medications    aspirin 81 MG EC tablet  Commonly known as: ECOTRIN  Take 81 mg by mouth.     pantoprazole 40 MG tablet  Commonly known as: PROTONIX  Take 1 tablet (40 mg total) by mouth 2 (two) times daily.        STOP taking these medications    albuterol 90 mcg/actuation inhaler  Commonly known as: PROVENTIL/VENTOLIN HFA     amLODIPine 5 MG tablet  Commonly known as: NORVASC     azelastine 137 mcg (0.1 %) nasal spray  Commonly known as: ASTELIN     docusate sodium 100 MG capsule  Commonly known as: COLACE     famotidine 20 MG tablet  Commonly known as: PEPCID     fluticasone propionate 50 mcg/actuation nasal spray  Commonly known as: FLONASE     ondansetron 8 MG Tbdl  Commonly known as: ZOFRAN-ODT     oxyCODONE-acetaminophen 5-325 mg per tablet  Commonly known as: PERCOCET            Indwelling Lines/Drains at time of discharge:   Lines/Drains/Airways     None                 Time spent on  the discharge of patient: 35 minutes         Nakul Kraft MD  Department of Hospital Medicine  [facility]

## 2022-12-01 NOTE — PLAN OF CARE
"   03/27/19 1125   Final Note   Assessment Type Final Discharge Note   Anticipated Discharge Disposition Home   What phone number can be called within the next 1-3 days to see how you are doing after discharge?   (Listed in chart)   Hospital Follow Up  Appt(s) scheduled? Yes   Discharge plans and expectations educations in teach back method with documentation complete? Yes   Right Care Referral Info   Post Acute Recommendation No Care     TN reviewed follow up appointment information as well as  "Post op discharge instructions" handout with patient using teach back while informing patient to concentrate on signs and symptoms to look for after discharge that would flag her that she needs to contact the doctor. Patient is in agreement and verbalized an understanding. Placed discharge information in blue discharge folder. TN also reviewed patient responsibility checklist with her using teach back. Patient was able to verbalize her responsibilities after discharge to manager her care at home being   1. Going to follow up appointments   2.  rx from the pharmacy when discharged  3. Taking her medication as prescribed     Patient's nurse, Rosalia, informed that patient can discharge from  standpoint. Nurse can now complete discharge and review signs and symptoms teaching.   " [facility]

## 2023-09-02 NOTE — ASSESSMENT & PLAN NOTE
Seen on ERCP 3/21  No signs of sepsis  Started cipro IV on 3/21- continue x7 days (last day 3/27)     respiratory difficulty   Respiratory therapy support as indicated     Problem: Skin/Tissue Integrity - Adult  Goal: Skin integrity remains intact  Outcome: Progressing  Flowsheets (Taken 9/2/2023 1120)  Skin Integrity Remains Intact:   Monitor for areas of redness and/or skin breakdown   Assess vascular access sites hourly     Problem: Musculoskeletal - Adult  Goal: Return ADL status to a safe level of function  Outcome: Progressing  Flowsheets (Taken 9/2/2023 1120)  Return ADL Status to a Safe Level of Function:   Administer medication as ordered   Assess activities of daily living deficits and provide assistive devices as needed   Obtain physical therapy/occupational therapy consults as needed   Assist and instruct patient to increase activity and self care as tolerated     Problem: Anxiety  Goal: Will report anxiety at manageable levels  Description: INTERVENTIONS:  1. Administer medication as ordered  2. Teach and rehearse alternative coping skills  3. Provide emotional support with 1:1 interaction with staff  Outcome: Progressing  Flowsheets (Taken 9/2/2023 1120)  Will report anxiety at manageable levels:   Administer medication as ordered   Teach and rehearse alternative coping skills   Provide emotional support with 1:1 interaction with staff     Problem: Decision Making  Goal: Pt/Family able to effectively weigh alternatives and participate in decision making related to treatment and care  Description: INTERVENTIONS:  1. Determine when there are differences between patient's view, family's view, and healthcare provider's view of condition  2. Facilitate patient and family articulation of goals for care  3. Help patient and family identify pros/cons of alternative solutions  4. Provide information as requested by patient/family  5. Respect patient/family right to receive or not to receive information  6. Serve as a liaison between patient and family and health care team  7.  Initiate Consults from Ethics, Palliative Care or initiate Family Care Conference as is appropriate  Outcome: Progressing  Flowsheets (Taken 9/2/2023 1120)  Patient/family able to effectively weigh alternatives and participate in decision making related to treatment and care:   Determine when there are differences between patient's view, family's view, and healthcare provider's view of condition   Facilitate patient and family articulation of goals for care   Help patient and family identify pros/cons of alternative solutions   Provide information as requested by patient/family   Respect patient/family right to receive or not to receive information   Serve as a liaison between patient and family and health care team   Initiate Consults from Ethics, Palliative Care or initiate [phone] as is appropriate   Care plan reviewed with patient. Patient verbalize understanding of the plan of care and contribute to goal setting.

## 2024-01-17 NOTE — PATIENT INSTRUCTIONS
The patient is requesting a refill of Meloxicam. She has two pills left. Wondering if she can get a 90 day supply. Preferred pharmacy listed. Please call once this is sent.     [phone]   You have been diagnosed with Influenza.   You are contagious for 24 hours after your last fever.  Please drink plenty of fluids.  Please get plenty of rest.  Please return here or go to the Emergency Department for any concerns or worsening of condition.  Tamiflu prescription has been discussed and if prescribed, please take to completion unless you cannot tolerate the side effects.     Take tylenol (acetominophen) for fever, chills or body aches every 4 hours. do not exceed 4000 mg/ day.  Take Motrin (Ibuprofen) every 4 hours for fever, chills, pain or inflammation.  Use an antihistmine such as claritin or zyrtec to dry you out. Use pseudoephedrine (behind the counter) to decongest (beware this can raise your blood pressure). Use mucinex (guaifenisin) to break up mucous      The Flu (Influenza)     The virus that causes the flu spreads through the air in droplets when someone who has the flu coughs, sneezes, laughs, or talks.   The flu (influenza) is an infection that affects your respiratory tract. This tract is made up of your mouth, nose, and lungs, and the passages between them. Unlike a cold, the flu can make you very ill. And it can lead to pneumonia, a serious lung infection. The flu can have serious complications and even cause death.  Who is at risk for the flu?  Anyone can get the flu. But you are more likely to become infected if you:  · Have a weakened immune system  · Work in a healthcare setting where you may be exposed to flu germs  · Live or work with someone who has the flu  · Havent had an annual flu shot  How does the flu spread?  The flu is caused by a virus. The virus spreads through the air in droplets when someone who has the flu coughs, sneezes, laughs, or talks. You can become infected when you inhale these viruses directly. You can also become infected when you touch a surface on which the droplets have landed and then transfer the germs to your eyes, nose, or mouth. Touching used  tissues, or sharing utensils, drinking glasses, or a toothbrush from an infected person can expose you to flu viruses, too.  What are the symptoms of the flu?  Flu symptoms tend to come on quickly and may last a few days to a few weeks. They include:  · Fever usually higher than 100.4°F  (38°C) and chills  · Sore throat and headache  · Dry cough  · Runny nose  · Tiredness and weakness  · Muscle aches  Who is at risk for flu complications?  For some people, the flu can be very serious. The risk for complications is greater for:  · Children younger than age 5  · Adults ages 65 and older  · People with a chronic illness such as diabetes or heart, kidney, or lung disease  · People who live in a nursing home or long-term care facility   How is the flu treated?  The flu usually gets better after 7 days or so. In some cases, your healthcare provider may prescribe an antiviral medicine. This may help you get well a little sooner. For the medicine to help, you need to take it as soon as possible (ideally within 48 hours) after your symptoms start. If you develop pneumonia or other serious illness, you may need to stay in the hospital.  Easing flu symptoms  · Drink lots of fluids such as water, juice, and warm soup. A good rule is to drink enough so that you urinate your normal amount.  · Get plenty of rest.  · Ask your healthcare provider what to take for fever and pain.  · Call your provider if your fever is 100.4°F (38°C) or higher, or you become dizzy, lightheaded, or short of breath.  Taking steps to protect others  · Wash your hands often, especially after coughing or sneezing. Or clean your hands with an alcohol-based hand  containing at least 60% alcohol.  · Cough or sneeze into a tissue. Then throw the tissue away and wash your hands. If you dont have a tissue, cough and sneeze into your elbow.  · Stay home until at least 24 hours after you no longer have a fever or chills. Be sure the fever isnt being  hidden by fever-reducing medicine.  · Dont share food, utensils, drinking glasses, or a toothbrush with others.  · Ask your healthcare provider if others in your household should get antiviral medicine to help them avoid infection.  How can the flu be prevented?  · One of the best ways to avoid the flu is to get a flu vaccine each year. The virus that causes the flu changes from year to year. For that reason, healthcare providers recommend getting the flu vaccine each year, as soon as it's available in your area. The vaccine is given as a shot. Your healthcare provider can tell you which vaccine is right for you. A nasal spray is also available but is not recommended for the 0767-3457 flu season. The CDC says this is because the nasal spray did not seem to protect against the flu over the last several flu seasons. In the past, it was meant for people ages 2 to 49.  · Wash your hands often. Frequent handwashing is a proven way to help prevent infection.  · Carry an alcohol-based hand gel containing at least 60% alcohol. Use it when you can't use soap and water. Then wash your hands as soon as you can.  · Avoid touching your eyes, nose, and mouth.  · At home and work, clean phones, computer keyboards, and toys often with disinfectant wipes.  · If possible, avoid close contact with others who have the flu or symptoms of the flu.  Handwashing tips  Handwashing is one of the best ways to prevent many common infections. If you are caring for or visiting someone with the flu, wash your hands each time you enter and leave the room. Follow these steps:  · Use warm water and plenty of soap. Rub your hands together well.  · Clean the whole hand, including under your nails, between your fingers, and up the wrists.  · Wash for at least 15 seconds.  · Rinse, letting the water run down your fingers, not up your wrists.  · Dry your hands well. Use a paper towel to turn off the faucet and open the door.  Using alcohol-based hand    Alcohol-based hand  are also a good choice. Use them when you can't use soap and water. Follow these steps:  · Squeeze about a tablespoon of gel into the palm of one hand.  · Rub your hands together briskly, cleaning the backs of your hands, the palms, between your fingers, and up the wrists.  · Rub until the gel is gone and your hands are completely dry.  Preventing the flu in healthcare settings  The flu is a special concern for people in hospitals and long-term care facilities. To help prevent the spread of flu, many hospitals and nursing homes take these steps:  · Healthcare providers wash their hands or use an alcohol-based hand  before and after treating each patient.  · People with the flu have private rooms and bathrooms or share a room with someone with the same infection.  · People who are at high risk for the flu but don't have it are encouraged to get the flu and pneumonia vaccines.  · All healthcare workers are encouraged or required to get flu shots.   Date Last Reviewed: 12/1/2016  © 4671-8044 [employer]. [address] [place] [address], [place] [address]. All rights reserved. This information is not intended as a substitute for professional medical care. Always follow your healthcare professional's instructions.

## (undated) DEVICE — GLOVE BIOGEL PI MICRO SZ 7

## (undated) DEVICE — ELECTRODE REM PLYHSV RETURN 9

## (undated) DEVICE — KIT ANTIFOG

## (undated) DEVICE — CLOSURE SKIN STERI STRIP 1/2X4

## (undated) DEVICE — TROCAR ENDOPATH XCEL 12X100MM

## (undated) DEVICE — TROCAR SPACEMAKER BLUNT 10MM

## (undated) DEVICE — SEE MEDLINE ITEM 152622

## (undated) DEVICE — TROCAR ENDOPATH XCEL 5X100MM

## (undated) DEVICE — SOL NS 1000CC

## (undated) DEVICE — PACK ENDOSCOPY GENERAL

## (undated) DEVICE — STAPLER SKIN PROXIMATE WIDE

## (undated) DEVICE — STAPLER ECHELON FLEX GST 60MM

## (undated) DEVICE — APPLICATOR CHLORAPREP ORN 26ML

## (undated) DEVICE — FOAM APP FILM BARRIER NO STING

## (undated) DEVICE — GLOVE SURGICAL LATEX SZ 7

## (undated) DEVICE — TAPE MEDIPORE 3 X 10YD

## (undated) DEVICE — CONTAINER SPECIMEN STRL 4OZ

## (undated) DEVICE — TUBING INSUFFLATION 10

## (undated) DEVICE — DISSECTOR CURVED 5DCD

## (undated) DEVICE — CLIP ENDO MED LRG 10MM

## (undated) DEVICE — Device

## (undated) DEVICE — GAUZE SPONGE 4X4 12PLY

## (undated) DEVICE — TROCAR ENDOPATH XCEL 11MM 10CM

## (undated) DEVICE — BAG TISS RETRV MONARCH 10MM

## (undated) DEVICE — SYR 10CC LUER LOCK

## (undated) DEVICE — RELOAD ECHELON FLEX BLU 60MM

## (undated) DEVICE — DRESSING ADH ISLAND 2.5 X 3

## (undated) DEVICE — SCISSOR CURVED ENDOPATH 5MM

## (undated) DEVICE — DRAIN SIL RND HUBLSS 19F TRCR

## (undated) DEVICE — CANISTER SUCTION 2 LTR

## (undated) DEVICE — SOL CLEARIFY VISUALIZATION LAP

## (undated) DEVICE — NDL HYPO REG 25G X 1 1/2

## (undated) DEVICE — EVACUATOR WOUND BULB 100CC

## (undated) DEVICE — SLEEVE SCD EXPRESS CALF MEDIUM

## (undated) DEVICE — BLANKET UPPER BODY 78.7X29.9IN

## (undated) DEVICE — SEE MEDLINE ITEM 157117

## (undated) DEVICE — SUPPORT ULNA NERVE PROTECTOR

## (undated) DEVICE — UNDERGLOVES BIOGEL PI SZ 7 LF